# Patient Record
Sex: MALE | Race: WHITE | NOT HISPANIC OR LATINO | URBAN - METROPOLITAN AREA
[De-identification: names, ages, dates, MRNs, and addresses within clinical notes are randomized per-mention and may not be internally consistent; named-entity substitution may affect disease eponyms.]

---

## 2017-04-04 RX ORDER — FINASTERIDE 5 MG/1
0 TABLET, FILM COATED ORAL
Qty: 30 | Refills: 0 | COMMUNITY
Start: 2017-04-04

## 2017-05-31 RX ORDER — OMEPRAZOLE 10 MG/1
0 CAPSULE, DELAYED RELEASE ORAL
Qty: 90 | Refills: 0 | COMMUNITY
Start: 2017-05-31

## 2017-05-31 RX ORDER — SIMVASTATIN 20 MG/1
0 TABLET, FILM COATED ORAL
Qty: 90 | Refills: 0 | COMMUNITY
Start: 2017-05-31

## 2017-07-11 ENCOUNTER — OUTPATIENT (OUTPATIENT)
Dept: OUTPATIENT SERVICES | Facility: HOSPITAL | Age: 82
LOS: 1 days | End: 2017-07-11

## 2017-07-11 ENCOUNTER — APPOINTMENT (OUTPATIENT)
Dept: OPHTHALMOLOGY | Facility: CLINIC | Age: 82
End: 2017-07-11

## 2017-07-12 DIAGNOSIS — H26.9 UNSPECIFIED CATARACT: ICD-10-CM

## 2017-07-13 ENCOUNTER — OUTPATIENT (OUTPATIENT)
Dept: OUTPATIENT SERVICES | Facility: HOSPITAL | Age: 82
LOS: 1 days | Discharge: HOME | End: 2017-07-13

## 2017-07-19 DIAGNOSIS — H26.8 OTHER SPECIFIED CATARACT: ICD-10-CM

## 2017-07-26 RX ORDER — LATANOPROST 0.05 MG/ML
0 SOLUTION/ DROPS OPHTHALMIC; TOPICAL
Qty: 75 | Refills: 0 | COMMUNITY
Start: 2017-07-26

## 2017-07-26 RX ORDER — DORZOLAMIDE HYDROCHLORIDE TIMOLOL MALEATE 20; 5 MG/ML; MG/ML
0 SOLUTION/ DROPS OPHTHALMIC
Qty: 10 | Refills: 0 | COMMUNITY
Start: 2017-07-26

## 2017-08-01 RX ORDER — BRIMONIDINE TARTRATE 2 MG/MG
0 SOLUTION/ DROPS OPHTHALMIC
Qty: 15 | Refills: 0 | COMMUNITY
Start: 2017-08-01

## 2017-08-01 RX ORDER — LOTEPREDNOL ETABONATE 2 MG/ML
0 SUSPENSION/ DROPS OPHTHALMIC
Qty: 10 | Refills: 0 | COMMUNITY
Start: 2017-08-01

## 2017-08-23 RX ORDER — FLUTICASONE PROPIONATE 50 MCG
0 SPRAY, SUSPENSION NASAL
Qty: 16 | Refills: 0 | COMMUNITY
Start: 2017-08-23

## 2017-08-25 ENCOUNTER — INPATIENT (INPATIENT)
Facility: HOSPITAL | Age: 82
LOS: 3 days | Discharge: HOME | DRG: 641 | End: 2017-08-29
Attending: INTERNAL MEDICINE | Admitting: INTERNAL MEDICINE
Payer: MEDICARE

## 2017-08-25 VITALS
HEIGHT: 68 IN | DIASTOLIC BLOOD PRESSURE: 76 MMHG | RESPIRATION RATE: 18 BRPM | TEMPERATURE: 99 F | WEIGHT: 197.98 LBS | OXYGEN SATURATION: 96 % | SYSTOLIC BLOOD PRESSURE: 137 MMHG | HEART RATE: 111 BPM

## 2017-08-25 DIAGNOSIS — E78.4 OTHER HYPERLIPIDEMIA: ICD-10-CM

## 2017-08-25 DIAGNOSIS — Z29.9 ENCOUNTER FOR PROPHYLACTIC MEASURES, UNSPECIFIED: ICD-10-CM

## 2017-08-25 DIAGNOSIS — K21.9 GASTRO-ESOPHAGEAL REFLUX DISEASE WITHOUT ESOPHAGITIS: ICD-10-CM

## 2017-08-25 DIAGNOSIS — E87.1 HYPO-OSMOLALITY AND HYPONATREMIA: ICD-10-CM

## 2017-08-25 DIAGNOSIS — R63.8 OTHER SYMPTOMS AND SIGNS CONCERNING FOOD AND FLUID INTAKE: ICD-10-CM

## 2017-08-25 DIAGNOSIS — R11.2 NAUSEA WITH VOMITING, UNSPECIFIED: ICD-10-CM

## 2017-08-25 DIAGNOSIS — H40.89 OTHER SPECIFIED GLAUCOMA: ICD-10-CM

## 2017-08-25 DIAGNOSIS — Z90.49 ACQUIRED ABSENCE OF OTHER SPECIFIED PARTS OF DIGESTIVE TRACT: Chronic | ICD-10-CM

## 2017-08-25 DIAGNOSIS — N17.9 ACUTE KIDNEY FAILURE, UNSPECIFIED: ICD-10-CM

## 2017-08-25 LAB
ALBUMIN SERPL ELPH-MCNC: 4.1 G/DL — SIGNIFICANT CHANGE UP (ref 3.3–5)
ALP SERPL-CCNC: 139 U/L — HIGH (ref 40–120)
ALT FLD-CCNC: 17 U/L — SIGNIFICANT CHANGE UP (ref 10–45)
ANION GAP SERPL CALC-SCNC: 12 MMOL/L — SIGNIFICANT CHANGE UP (ref 5–17)
ANION GAP SERPL CALC-SCNC: 16 MMOL/L — SIGNIFICANT CHANGE UP (ref 5–17)
APPEARANCE UR: CLEAR — SIGNIFICANT CHANGE UP
AST SERPL-CCNC: 24 U/L — SIGNIFICANT CHANGE UP (ref 10–40)
BACTERIA # UR AUTO: PRESENT /HPF
BASOPHILS NFR BLD AUTO: 0.5 % — SIGNIFICANT CHANGE UP (ref 0–2)
BILIRUB SERPL-MCNC: 0.7 MG/DL — SIGNIFICANT CHANGE UP (ref 0.2–1.2)
BILIRUB UR-MCNC: NEGATIVE — SIGNIFICANT CHANGE UP
BUN SERPL-MCNC: 17 MG/DL — SIGNIFICANT CHANGE UP (ref 7–23)
BUN SERPL-MCNC: 19 MG/DL — SIGNIFICANT CHANGE UP (ref 7–23)
CALCIUM SERPL-MCNC: 8.9 MG/DL — SIGNIFICANT CHANGE UP (ref 8.4–10.5)
CALCIUM SERPL-MCNC: 9.9 MG/DL — SIGNIFICANT CHANGE UP (ref 8.4–10.5)
CHLORIDE SERPL-SCNC: 90 MMOL/L — LOW (ref 96–108)
CHLORIDE SERPL-SCNC: 98 MMOL/L — SIGNIFICANT CHANGE UP (ref 96–108)
CO2 SERPL-SCNC: 21 MMOL/L — LOW (ref 22–31)
CO2 SERPL-SCNC: 22 MMOL/L — SIGNIFICANT CHANGE UP (ref 22–31)
COLOR SPEC: YELLOW — SIGNIFICANT CHANGE UP
CREAT ?TM UR-MCNC: 47 MG/DL — SIGNIFICANT CHANGE UP
CREAT SERPL-MCNC: 1.6 MG/DL — HIGH (ref 0.5–1.3)
CREAT SERPL-MCNC: 1.7 MG/DL — HIGH (ref 0.5–1.3)
DIFF PNL FLD: NEGATIVE — SIGNIFICANT CHANGE UP
EOSINOPHIL NFR BLD AUTO: 2.9 % — SIGNIFICANT CHANGE UP (ref 0–6)
EPI CELLS # UR: SIGNIFICANT CHANGE UP /HPF
GLUCOSE SERPL-MCNC: 121 MG/DL — HIGH (ref 70–99)
GLUCOSE SERPL-MCNC: 131 MG/DL — HIGH (ref 70–99)
GLUCOSE UR QL: NEGATIVE — SIGNIFICANT CHANGE UP
HCT VFR BLD CALC: 43.2 % — SIGNIFICANT CHANGE UP (ref 39–50)
HGB BLD-MCNC: 14.5 G/DL — SIGNIFICANT CHANGE UP (ref 13–17)
HYALINE CASTS # UR AUTO: (no result) /LPF
KETONES UR-MCNC: NEGATIVE — SIGNIFICANT CHANGE UP
LACTATE SERPL-SCNC: 1.3 MMOL/L — SIGNIFICANT CHANGE UP (ref 0.5–2)
LEUKOCYTE ESTERASE UR-ACNC: (no result)
LIDOCAIN IGE QN: 31 U/L — SIGNIFICANT CHANGE UP (ref 7–60)
LYMPHOCYTES # BLD AUTO: 10.4 % — LOW (ref 13–44)
MCHC RBC-ENTMCNC: 29.9 PG — SIGNIFICANT CHANGE UP (ref 27–34)
MCHC RBC-ENTMCNC: 33.6 G/DL — SIGNIFICANT CHANGE UP (ref 32–36)
MCV RBC AUTO: 89.1 FL — SIGNIFICANT CHANGE UP (ref 80–100)
MONOCYTES NFR BLD AUTO: 17.8 % — HIGH (ref 2–14)
NEUTROPHILS NFR BLD AUTO: 68.4 % — SIGNIFICANT CHANGE UP (ref 43–77)
NITRITE UR-MCNC: NEGATIVE — SIGNIFICANT CHANGE UP
OSMOLALITY SERPL: 279 MOSM/KG — LOW (ref 280–301)
OSMOLALITY UR: 265 MOSMOL/KG — SIGNIFICANT CHANGE UP (ref 100–650)
PH UR: 7.5 — SIGNIFICANT CHANGE UP (ref 5–8)
PLATELET # BLD AUTO: 307 K/UL — SIGNIFICANT CHANGE UP (ref 150–400)
POTASSIUM SERPL-MCNC: 4.8 MMOL/L — SIGNIFICANT CHANGE UP (ref 3.5–5.3)
POTASSIUM SERPL-MCNC: 5 MMOL/L — SIGNIFICANT CHANGE UP (ref 3.5–5.3)
POTASSIUM SERPL-SCNC: 4.8 MMOL/L — SIGNIFICANT CHANGE UP (ref 3.5–5.3)
POTASSIUM SERPL-SCNC: 5 MMOL/L — SIGNIFICANT CHANGE UP (ref 3.5–5.3)
PROT SERPL-MCNC: 8 G/DL — SIGNIFICANT CHANGE UP (ref 6–8.3)
PROT UR-MCNC: NEGATIVE MG/DL — SIGNIFICANT CHANGE UP
RBC # BLD: 4.85 M/UL — SIGNIFICANT CHANGE UP (ref 4.2–5.8)
RBC # FLD: 12.1 % — SIGNIFICANT CHANGE UP (ref 10.3–16.9)
RBC CASTS # UR COMP ASSIST: < 5 /HPF — SIGNIFICANT CHANGE UP
SODIUM SERPL-SCNC: 127 MMOL/L — LOW (ref 135–145)
SODIUM SERPL-SCNC: 132 MMOL/L — LOW (ref 135–145)
SODIUM UR-SCNC: 69 MMOL/L — SIGNIFICANT CHANGE UP
SP GR SPEC: 1.01 — SIGNIFICANT CHANGE UP (ref 1–1.03)
TROPONIN T SERPL-MCNC: <0.01 NG/ML — SIGNIFICANT CHANGE UP (ref 0–0.01)
UROBILINOGEN FLD QL: 0.2 E.U./DL — SIGNIFICANT CHANGE UP
UUN UR-MCNC: 262 MG/DL — SIGNIFICANT CHANGE UP
WBC # BLD: 6.6 K/UL — SIGNIFICANT CHANGE UP (ref 3.8–10.5)
WBC # FLD AUTO: 6.6 K/UL — SIGNIFICANT CHANGE UP (ref 3.8–10.5)
WBC UR QL: < 5 /HPF — SIGNIFICANT CHANGE UP

## 2017-08-25 PROCEDURE — 93010 ELECTROCARDIOGRAM REPORT: CPT

## 2017-08-25 PROCEDURE — 74176 CT ABD & PELVIS W/O CONTRAST: CPT | Mod: 26

## 2017-08-25 PROCEDURE — 99285 EMERGENCY DEPT VISIT HI MDM: CPT | Mod: 25

## 2017-08-25 PROCEDURE — 74020: CPT | Mod: 26

## 2017-08-25 PROCEDURE — 71020: CPT | Mod: 26

## 2017-08-25 RX ORDER — SIMVASTATIN 20 MG/1
40 TABLET, FILM COATED ORAL AT BEDTIME
Qty: 0 | Refills: 0 | Status: DISCONTINUED | OUTPATIENT
Start: 2017-08-25 | End: 2017-08-29

## 2017-08-25 RX ORDER — PANTOPRAZOLE SODIUM 20 MG/1
40 TABLET, DELAYED RELEASE ORAL
Qty: 0 | Refills: 0 | Status: DISCONTINUED | OUTPATIENT
Start: 2017-08-25 | End: 2017-08-29

## 2017-08-25 RX ORDER — ONDANSETRON 8 MG/1
4 TABLET, FILM COATED ORAL ONCE
Qty: 0 | Refills: 0 | Status: COMPLETED | OUTPATIENT
Start: 2017-08-25 | End: 2017-08-25

## 2017-08-25 RX ORDER — HEPARIN SODIUM 5000 [USP'U]/ML
5000 INJECTION INTRAVENOUS; SUBCUTANEOUS EVERY 8 HOURS
Qty: 0 | Refills: 0 | Status: DISCONTINUED | OUTPATIENT
Start: 2017-08-25 | End: 2017-08-29

## 2017-08-25 RX ORDER — SODIUM CHLORIDE 9 MG/ML
1000 INJECTION, SOLUTION INTRAVENOUS
Qty: 0 | Refills: 0 | Status: DISCONTINUED | OUTPATIENT
Start: 2017-08-25 | End: 2017-08-28

## 2017-08-25 RX ORDER — ACETAMINOPHEN 500 MG
650 TABLET ORAL EVERY 6 HOURS
Qty: 0 | Refills: 0 | Status: DISCONTINUED | OUTPATIENT
Start: 2017-08-25 | End: 2017-08-29

## 2017-08-25 RX ORDER — SODIUM CHLORIDE 9 MG/ML
1000 INJECTION INTRAMUSCULAR; INTRAVENOUS; SUBCUTANEOUS ONCE
Qty: 0 | Refills: 0 | Status: COMPLETED | OUTPATIENT
Start: 2017-08-25 | End: 2017-08-25

## 2017-08-25 RX ORDER — LATANOPROST 0.05 MG/ML
1 SOLUTION/ DROPS OPHTHALMIC; TOPICAL AT BEDTIME
Qty: 0 | Refills: 0 | Status: DISCONTINUED | OUTPATIENT
Start: 2017-08-25 | End: 2017-08-29

## 2017-08-25 RX ORDER — SODIUM CHLORIDE 9 MG/ML
1000 INJECTION INTRAMUSCULAR; INTRAVENOUS; SUBCUTANEOUS
Qty: 0 | Refills: 0 | Status: DISCONTINUED | OUTPATIENT
Start: 2017-08-25 | End: 2017-08-25

## 2017-08-25 RX ORDER — FAMOTIDINE 10 MG/ML
20 INJECTION INTRAVENOUS ONCE
Qty: 0 | Refills: 0 | Status: COMPLETED | OUTPATIENT
Start: 2017-08-25 | End: 2017-08-25

## 2017-08-25 RX ADMIN — FAMOTIDINE 20 MILLIGRAM(S): 10 INJECTION INTRAVENOUS at 10:31

## 2017-08-25 RX ADMIN — SODIUM CHLORIDE 1000 MILLILITER(S): 9 INJECTION INTRAMUSCULAR; INTRAVENOUS; SUBCUTANEOUS at 10:30

## 2017-08-25 RX ADMIN — HEPARIN SODIUM 5000 UNIT(S): 5000 INJECTION INTRAVENOUS; SUBCUTANEOUS at 21:22

## 2017-08-25 RX ADMIN — SIMVASTATIN 40 MILLIGRAM(S): 20 TABLET, FILM COATED ORAL at 21:22

## 2017-08-25 RX ADMIN — ONDANSETRON 104 MILLIGRAM(S): 8 TABLET, FILM COATED ORAL at 10:31

## 2017-08-25 RX ADMIN — LATANOPROST 1 DROP(S): 0.05 SOLUTION/ DROPS OPHTHALMIC; TOPICAL at 21:22

## 2017-08-25 RX ADMIN — SODIUM CHLORIDE 100 MILLILITER(S): 9 INJECTION, SOLUTION INTRAVENOUS at 21:22

## 2017-08-25 RX ADMIN — SODIUM CHLORIDE 125 MILLILITER(S): 9 INJECTION INTRAMUSCULAR; INTRAVENOUS; SUBCUTANEOUS at 13:30

## 2017-08-25 NOTE — H&P ADULT - PROBLEM SELECTOR PLAN 5
s/p cataract surgery Aug 10. Currently w/o complaints.   - c/w home lantoprost 0.005% solution - c/w simvastatin 40mg

## 2017-08-25 NOTE — H&P ADULT - PROBLEM SELECTOR PLAN 6
F:   E: Replete electrolytes PRN goal K>2 Mg>4  N: Regular Diet s/p cataract surgery Aug 10. Currently w/o complaints.   - c/w home Latanoprost 0.005% solution

## 2017-08-25 NOTE — H&P ADULT - NSHPPHYSICALEXAM_GEN_ALL_CORE
.  VITAL SIGNS:  T(F): 98.3 (08-25-17 @ 16:00), Max: 98.6 (08-25-17 @ 09:49)  HR: 83 (08-25-17 @ 16:00) (82 - 111)  BP: 148/82 (08-25-17 @ 16:00) (122/70 - 148/82)  BP(mean): --  RR: 18 (08-25-17 @ 16:00) (18 - 18)  SpO2: 95% (08-25-17 @ 16:00) (95% - 98%)    PHYSICAL EXAM:    Constitutional: NAD. Appears stated age. Well appearing elderly M   HEENT: NC/AT, PERRL, EOMI, anicteric sclera, no nasal discharge; uvula midline, no oropharyngeal erythema or exudates; DRY MUCUS MEMBRANES  Neck: supple; no JVD   Respiratory: CTA B/L; no W/R/R, no retractions  Cardiac: +S1/S2; regular rate and rhythm, no M/R/G; PMI non-displaced  Gastrointestinal: soft, NT/ND; no rebound or guarding; +BSx4. Belching and c/o Nausea when palpating epigastric region.  Back: spine midline, no bony tenderness or step-offs; no CVAT B/L  Extremities: WWP, no clubbing or cyanosis; no peripheral edema  Musculoskeletal: NROM x4; no joint swelling, tenderness or erythema  Vascular: 2+ radial, DP/PT pulses B/L  Neurologic: AAOx3; CNII-XII grossly intact; no focal deficits

## 2017-08-25 NOTE — H&P ADULT - NSHPSOCIALHISTORY_GEN_ALL_CORE
Patient lives alone, strong support system with daughter who lives in [place]  Patient former smoker (quit 20years ago), was unable to quantify amount. Former ETOH use (6-7 beers/day), last drink 6 years ago.  No IVDU no recreation drug use. Patient lives alone, strong support system with daughter who lives in [place].  I DISCUSSED HIS CASE WITH HER  Patient former smoker (quit 20years ago), was unable to quantify amount. Former ETOH use (6-7 beers/day), last drink 6 years ago.  No IVDU no recreation drug use.

## 2017-08-25 NOTE — H&P ADULT - ASSESSMENT
82y M with PMHx of GERD, recent cataract surgery and h/o cholecystectomy (2016) presented to [facility] ED dry heaving overnight, worsening of his baseline nausea x 2years. 82y M with PMHx of GERD, recent cataract surgery and h/o cholecystectomy (2016) presented to [facility] ED dry heaving overnight, worsening of his baseline nausea x 2years.     IV FLUID  D/C MRCP    ultrasound proximal pancreas

## 2017-08-25 NOTE — H&P ADULT - PROBLEM SELECTOR PLAN 3
- c/w home dose Omeprazole 20mg PO Qd Cr on admission 1.7. Likely preprenal in the setting of decreased PO and signs of hypovolemia  - recheck BMP, consider starting maintenance and encourage PO intake/hydration  - f/u urine lytes and calc FENa Cr on admission 1.7. Likely pre-renal in the setting of decreased PO and signs of hypovolemia  - recheck BMP, consider starting maintenance and encourage PO intake/hydration  - f/u urine lytes and calc FENa Cr on admission 1.7. Likely pre-renal in the setting of decreased PO and signs of hypovolemia MISSING RIGHT KIDNEY  - recheck BMP, consider starting maintenance and encourage PO intake/hydration  - f/u urine lytes and calc FENa

## 2017-08-25 NOTE — H&P ADULT - PROBLEM SELECTOR PLAN 2
Na 127, likely hypovolemic hyponatremia in the setting of decreased PO intake. Pt AOx3. s/p 1L NS  - c/w maintenance fluids 120 cc/Hr; monitor with frequent lung checks   - f/u Urine lytes to calc FENa  - continue to encourage PO intake Na 127, likely hypovolemic hyponatremia in the setting of decreased PO intake. Pt AOx3. s/p 1L NS  - recheck BMP, consider starting maintenance fluids 120 cc/Hr; monitor with frequent lung checks   - f/u Urine lytes to calc FENa  - continue to encourage PO intake

## 2017-08-25 NOTE — H&P ADULT - REASON FOR ADMISSION
Nausea, vomiting with associated abdominal pain Worsening nausea over the last two years, with dry heaves "all night"

## 2017-08-25 NOTE — H&P ADULT - NSHPLABSRESULTS_GEN_ALL_CORE
.  LABS:                         14.5   6.6   )-----------( 307      ( 25 Aug 2017 10:32 )             43.2         127<L>  |  90<L>  |  19  ----------------------------<  131<H>  4.8   |  21<L>  |  1.70<H>    Ca    9.9      25 Aug 2017 10:32    TPro  8.0  /  Alb  4.1  /  TBili  0.7  /  DBili  x   /  AST  24  /  ALT  17  /  AlkPhos  139<H>        Urinalysis Basic - ( 25 Aug 2017 12:18 )    Color: Yellow / Appearance: Clear / S.010 / pH: x  Gluc: x / Ketone: NEGATIVE  / Bili: NEGATIVE / Urobili: 0.2 E.U./dL   Blood: x / Protein: NEGATIVE mg/dL / Nitrite: NEGATIVE   Leuk Esterase: Trace / RBC: < 5 /HPF / WBC < 5 /HPF   Sq Epi: x / Non Sq Epi: Rare /HPF / Bacteria: Present /HPF      CARDIAC MARKERS ( 25 Aug 2017 10:32 )  x     / <0.01 ng/mL / 51 U/L / x     / 1.5 ng/mL        Lactate, Blood: 1.3 mmoL/L ( @ 10:30)      RADIOLOGY, EKG & ADDITIONAL TESTS: Reviewed. .  LABS:                         14.5   6.6   )-----------( 307      ( 25 Aug 2017 10:32 )             43.2         127<L>  |  90<L>  |  19  ----------------------------<  131<H>  4.8   |  21<L>  |  1.70<H>    Ca    9.9      25 Aug 2017 10:32    TPro  8.0  /  Alb  4.1  /  TBili  0.7  /  DBili  x   /  AST  24  /  ALT  17  /  AlkPhos  139<H>        Urinalysis Basic - ( 25 Aug 2017 12:18 )    Color: Yellow / Appearance: Clear / S.010 / pH: x  Gluc: x / Ketone: NEGATIVE  / Bili: NEGATIVE / Urobili: 0.2 E.U./dL   Blood: x / Protein: NEGATIVE mg/dL / Nitrite: NEGATIVE   Leuk Esterase: Trace / RBC: < 5 /HPF / WBC < 5 /HPF   Sq Epi: x / Non Sq Epi: Rare /HPF / Bacteria: Present /HPF      CARDIAC MARKERS ( 25 Aug 2017 10:32 )  x     / <0.01 ng/mL / 51 U/L / x     / 1.5 ng/mL        Lactate, Blood: 1.3 mmoL/L ( @ 10:30)      RADIOLOGY, EKG & ADDITIONAL TESTS: will review

## 2017-08-25 NOTE — H&P ADULT - PROBLEM SELECTOR PLAN 1
CT Abd:   - Zofran 4mg PRN Pt c/o worsening nausea x2 years, with dry-heaving o/n. Pt  burp/dry heave on palpation epigastric region. No likely infectious in origin. CT Abd demonstrates evidence of hiatal hernia.   - Pt c/o worsening nausea x2 years, with dry-heaving o/n. Pt  burp/dry heave on palpation epigastric region. No likely infectious in origin. CT Abd demonstrates evidence of hiatal hernia.   - Given QTc 479, try to avoid antiemetics; if required recheck after admission  - Antacids and PPI Pt c/o worsening nausea x2 years, with dry-heaving o/n. Pt  burp/dry heave on palpation epigastric region. No likely infectious in origin. CT Abd demonstrates evidence of hiatal hernia.   - Given QTc 479, try to avoid antiemetics; if required recheck ECG after given  - Antacids and PPI

## 2017-08-25 NOTE — ED PROVIDER NOTE - CARE PLAN
Principal Discharge DX:	Abdominal pain  Secondary Diagnosis:	Nausea & vomiting Principal Discharge DX:	Abdominal pain  Secondary Diagnosis:	Nausea & vomiting  Secondary Diagnosis:	Hyponatremia

## 2017-08-25 NOTE — ED PROVIDER NOTE - MEDICAL DECISION MAKING DETAILS
abd pain x 2 days  N/V  mid  ab tenderness non obstructive bowel gas pattern  RBB on EKG  with pvcs  no ESPINOZA or PND  not obv aCS  await trop ? gastritis vs appy ? will CT

## 2017-08-25 NOTE — ED ADULT NURSE NOTE - OBJECTIVE STATEMENT
c/o ABD pain, nausea, vomited 15 times. started 12 hours ago. Gallbladder removed 2 years ago. Pain mid upper area, similar to gall bladder problems. Eye surgery ( glaucoma) 8/10/17 started new eye drops. 2 days ago ed using 1 eyedrop. Due to dizziness. Pt c/o ABD pain, nausea, vomited 15 times. started 12 hours ago. Gallbladder removed 2 years ago. Pain mid upper area, similar to gall bladder problems. Eye surgery ( glaucoma) 8/10/17 started new eye drops. 2 days ago ed using 1 eyedrop. Due to dizziness. Denies CP, SOB, Not in distress.

## 2017-08-25 NOTE — H&P ADULT - HISTORY OF PRESENT ILLNESS
In the ED, VS T 37 137/76  RR18 (96% RA). Labs significant for hyponatremia (127). Patient given 1L NS Bolus and started on NS @ In the ED, VS T 37 137/76  RR18 (96% RA). Labs significant for hyponatremia (127). Patient given 1L NS Bolus and started on NS @125cc/Hr. S/p Zofran 4mg IVP and famotidine 20mg IVP 82y M with PMHx of GERD, recent cataract surgery and h/o cholecystectomy presented to [facility] ED with 2 years of N/V. Per patient, he has had Nausea and dry heaving for the last 2 years but overnight, his sx worsened and he dry heaved "hundreds of times." Patient describes the discomft as "gas like" in the epigastric region with associated belching with minor relief. Pt cant identify exacerbating/alleviating sx. Patient's last BM was while in the ED. Patient endorsed decrease PO intake over the last couple of days 2/2 to worsening nausea; currently, he feels very thirsy. Pt denies Abd or back pain. Patient w/o any other complaints. Of note, patient have recent cataract surgery (Aug 10) and tolerated the procedure well, has been compliant with prescribed drops.   Pt denies chest pain, palpitations, lightheadedness SOB, HA, numbness tingling, recent weight loss/gain, dysuria, fever/chills.     In the ED, VS T 37 137/76  RR18 (96% RA). Labs significant for hyponatremia (127). Patient given 1L NS Bolus and started on NS @125cc/Hr. 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 ED with 2 years of N/V. Per patient, he has had Nausea and dry heaving for the last 2 years but overnight, his sx worsened and he dry heaved "hundreds of times." Patient describes the discomft as "gas like" in the epigastric region with associated belching with minor relief. Pt cant identify exacerbating/alleviating sx. Patient's last BM was while in the ED. Patient endorsed decrease PO intake over the last couple of days 2/2 to worsening nausea; currently, he feels very thirsty. THE PAIN WAS SO BAD THAT HE THOUGHT ABOUT SUICIDE. LIKE A KNIFE Pt denies Abd or back pain. Patient w/o any other complaints. Of note, patient have recent cataract surgery (Aug 10) and tolerated the procedure well, has been compliant with prescribed drops.   Pt denies chest pain, palpitations, lightheadedness SOB, HA, numbness tingling, recent weight loss/gain, dysuria, fever/chills.     In the ED, VS T 37 137/76  RR18 (96% RA). Labs significant for hyponatremia (127). Patient given 1L NS Bolus and started on NS @125cc/Hr. S/p Zofran 4mg IVP and famotidine 20mg IVP

## 2017-08-25 NOTE — ED PROVIDER NOTE - OBJECTIVE STATEMENT
82 male s/p lap choly 2 years ago- with N/V  x 2 episodes in the past day no SOB or CP -  had cataract surgery 2 days ago -  no headache or worsening  visual changes -no neck pain   no cough or sob  no leg edema--- dec po intake over the past 2 days  no melena or hematochezia   no diarrhea  no prior hx of PUD  no weight loss or fatigue recently

## 2017-08-25 NOTE — H&P ADULT - PROBLEM SELECTOR PLAN 7
SubQ heparin    FULL CODE  Dispo: home when clinically stable. F:   E: Replete electrolytes PRN goal K>2 Mg>4  N: Regular Diet F: Pen BMP, consider maintenance  E: Replete electrolytes PRN goal K>2 Mg>4  N: Regular Diet

## 2017-08-25 NOTE — ED ADULT NURSE NOTE - PMH
Benign prostatic hyperplasia, presence of lower urinary tract symptoms unspecified, unspecified morphology    Other glaucoma of both eyes    Other hyperlipidemia

## 2017-08-25 NOTE — H&P ADULT - NSHPREVIEWOFSYSTEMS_GEN_ALL_CORE
CONSTITUTIONAL: No weakness, fevers or chills  EYES/ENT: No visual changes (improvement 2/2 recent cataract sx);  No vertigo or throat pain   NECK: No pain or stiffness  RESPIRATORY: No cough, wheezing, hemoptysis; No shortness of breath  CARDIOVASCULAR: No chest pain or palpitations  GASTROINTESTINAL: (-) abdominal pain (+) nausea (+) dry-heave (-) vomiting.  No diarrhea or constipation. No melena or hematochezia.  GENITOURINARY: No dysuria, frequency or hematuria  NEUROLOGICAL: No numbness or weakness  SKIN: No itching, burning, rashes, or lesions   All other review of systems is negative unless indicated above.

## 2017-08-26 ENCOUNTER — TRANSCRIPTION ENCOUNTER (OUTPATIENT)
Age: 82
End: 2017-08-26

## 2017-08-26 DIAGNOSIS — R10.9 UNSPECIFIED ABDOMINAL PAIN: ICD-10-CM

## 2017-08-26 DIAGNOSIS — E86.0 DEHYDRATION: ICD-10-CM

## 2017-08-26 DIAGNOSIS — R05 COUGH: ICD-10-CM

## 2017-08-26 DIAGNOSIS — H10.9 UNSPECIFIED CONJUNCTIVITIS: ICD-10-CM

## 2017-08-26 DIAGNOSIS — R41.82 ALTERED MENTAL STATUS, UNSPECIFIED: ICD-10-CM

## 2017-08-26 DIAGNOSIS — R09.82 POSTNASAL DRIP: ICD-10-CM

## 2017-08-26 DIAGNOSIS — R41.0 DISORIENTATION, UNSPECIFIED: ICD-10-CM

## 2017-08-26 LAB
ANION GAP SERPL CALC-SCNC: 12 MMOL/L — SIGNIFICANT CHANGE UP (ref 5–17)
BUN SERPL-MCNC: 16 MG/DL — SIGNIFICANT CHANGE UP (ref 7–23)
CALCIUM SERPL-MCNC: 8.8 MG/DL — SIGNIFICANT CHANGE UP (ref 8.4–10.5)
CHLORIDE SERPL-SCNC: 99 MMOL/L — SIGNIFICANT CHANGE UP (ref 96–108)
CO2 SERPL-SCNC: 23 MMOL/L — SIGNIFICANT CHANGE UP (ref 22–31)
CREAT SERPL-MCNC: 1.6 MG/DL — HIGH (ref 0.5–1.3)
CULTURE RESULTS: NO GROWTH — SIGNIFICANT CHANGE UP
GLUCOSE SERPL-MCNC: 104 MG/DL — HIGH (ref 70–99)
HCT VFR BLD CALC: 37.7 % — LOW (ref 39–50)
HGB BLD-MCNC: 12.6 G/DL — LOW (ref 13–17)
MAGNESIUM SERPL-MCNC: 2.1 MG/DL — SIGNIFICANT CHANGE UP (ref 1.6–2.6)
MCHC RBC-ENTMCNC: 30.7 PG — SIGNIFICANT CHANGE UP (ref 27–34)
MCHC RBC-ENTMCNC: 33.4 G/DL — SIGNIFICANT CHANGE UP (ref 32–36)
MCV RBC AUTO: 92 FL — SIGNIFICANT CHANGE UP (ref 80–100)
PHOSPHATE SERPL-MCNC: 3.9 MG/DL — SIGNIFICANT CHANGE UP (ref 2.5–4.5)
PLATELET # BLD AUTO: 273 K/UL — SIGNIFICANT CHANGE UP (ref 150–400)
POTASSIUM SERPL-MCNC: 4.7 MMOL/L — SIGNIFICANT CHANGE UP (ref 3.5–5.3)
POTASSIUM SERPL-SCNC: 4.7 MMOL/L — SIGNIFICANT CHANGE UP (ref 3.5–5.3)
RBC # BLD: 4.1 M/UL — LOW (ref 4.2–5.8)
RBC # FLD: 12.6 % — SIGNIFICANT CHANGE UP (ref 10.3–16.9)
SODIUM SERPL-SCNC: 134 MMOL/L — LOW (ref 135–145)
SPECIMEN SOURCE: SIGNIFICANT CHANGE UP
WBC # BLD: 6.1 K/UL — SIGNIFICANT CHANGE UP (ref 3.8–10.5)
WBC # FLD AUTO: 6.1 K/UL — SIGNIFICANT CHANGE UP (ref 3.8–10.5)

## 2017-08-26 RX ORDER — LOTEPREDNOL ETABONATE 2 MG/ML
1 SUSPENSION/ DROPS OPHTHALMIC THREE TIMES A DAY
Qty: 0 | Refills: 0 | Status: DISCONTINUED | OUTPATIENT
Start: 2017-08-26 | End: 2017-08-29

## 2017-08-26 RX ORDER — FLUTICASONE PROPIONATE 50 MCG
1 SPRAY, SUSPENSION NASAL DAILY
Qty: 0 | Refills: 0 | Status: DISCONTINUED | OUTPATIENT
Start: 2017-08-26 | End: 2017-08-29

## 2017-08-26 RX ORDER — DORZOLAMIDE HYDROCHLORIDE TIMOLOL MALEATE 20; 5 MG/ML; MG/ML
1 SOLUTION/ DROPS OPHTHALMIC
Qty: 0 | Refills: 0 | Status: DISCONTINUED | OUTPATIENT
Start: 2017-08-26 | End: 2017-08-29

## 2017-08-26 RX ORDER — BRIMONIDINE TARTRATE 2 MG/MG
1 SOLUTION/ DROPS OPHTHALMIC
Qty: 0 | Refills: 0 | Status: DISCONTINUED | OUTPATIENT
Start: 2017-08-26 | End: 2017-08-29

## 2017-08-26 RX ORDER — THIAMINE MONONITRATE (VIT B1) 100 MG
100 TABLET ORAL DAILY
Qty: 0 | Refills: 0 | Status: DISCONTINUED | OUTPATIENT
Start: 2017-08-26 | End: 2017-08-29

## 2017-08-26 RX ORDER — FLUTICASONE PROPIONATE 50 MCG
1 SPRAY, SUSPENSION NASAL
Qty: 0 | Refills: 0 | COMMUNITY
Start: 2017-08-26

## 2017-08-26 RX ORDER — DORZOLAMIDE HYDROCHLORIDE TIMOLOL MALEATE 20; 5 MG/ML; MG/ML
1 SOLUTION/ DROPS OPHTHALMIC
Qty: 0 | Refills: 0 | COMMUNITY
Start: 2017-08-26

## 2017-08-26 RX ORDER — THIAMINE MONONITRATE (VIT B1) 100 MG
100 TABLET ORAL DAILY
Qty: 0 | Refills: 0 | Status: DISCONTINUED | OUTPATIENT
Start: 2017-08-26 | End: 2017-08-26

## 2017-08-26 RX ORDER — BRIMONIDINE TARTRATE 2 MG/MG
1 SOLUTION/ DROPS OPHTHALMIC
Qty: 0 | Refills: 0 | COMMUNITY
Start: 2017-08-26

## 2017-08-26 RX ORDER — SODIUM CHLORIDE 0.65 %
1 AEROSOL, SPRAY (ML) NASAL DAILY
Qty: 0 | Refills: 0 | Status: DISCONTINUED | OUTPATIENT
Start: 2017-08-26 | End: 2017-08-29

## 2017-08-26 RX ADMIN — DORZOLAMIDE HYDROCHLORIDE TIMOLOL MALEATE 1 DROP(S): 20; 5 SOLUTION/ DROPS OPHTHALMIC at 17:33

## 2017-08-26 RX ADMIN — HEPARIN SODIUM 5000 UNIT(S): 5000 INJECTION INTRAVENOUS; SUBCUTANEOUS at 06:11

## 2017-08-26 RX ADMIN — Medication 650 MILLIGRAM(S): at 01:32

## 2017-08-26 RX ADMIN — SIMVASTATIN 40 MILLIGRAM(S): 20 TABLET, FILM COATED ORAL at 21:22

## 2017-08-26 RX ADMIN — PANTOPRAZOLE SODIUM 40 MILLIGRAM(S): 20 TABLET, DELAYED RELEASE ORAL at 06:11

## 2017-08-26 RX ADMIN — Medication 102 MILLIGRAM(S): at 16:05

## 2017-08-26 RX ADMIN — Medication 1 SPRAY(S): at 18:55

## 2017-08-26 RX ADMIN — HEPARIN SODIUM 5000 UNIT(S): 5000 INJECTION INTRAVENOUS; SUBCUTANEOUS at 13:21

## 2017-08-26 RX ADMIN — BRIMONIDINE TARTRATE 1 DROP(S): 2 SOLUTION/ DROPS OPHTHALMIC at 17:50

## 2017-08-26 RX ADMIN — LOTEPREDNOL ETABONATE 1 DROP(S): 2 SUSPENSION/ DROPS OPHTHALMIC at 23:08

## 2017-08-26 RX ADMIN — LATANOPROST 1 DROP(S): 0.05 SOLUTION/ DROPS OPHTHALMIC; TOPICAL at 21:22

## 2017-08-26 RX ADMIN — SODIUM CHLORIDE 100 MILLILITER(S): 9 INJECTION, SOLUTION INTRAVENOUS at 20:53

## 2017-08-26 RX ADMIN — HEPARIN SODIUM 5000 UNIT(S): 5000 INJECTION INTRAVENOUS; SUBCUTANEOUS at 21:22

## 2017-08-26 RX ADMIN — Medication 1 SPRAY(S): at 18:54

## 2017-08-26 NOTE — PROGRESS NOTE ADULT - SUBJECTIVE AND OBJECTIVE BOX
INTERVAL HPI/OVERNIGHT EVENTS:  Patient was seen and examined at bedside. Pt states that he is still having nausea and feels uneasy. He has been dry heaving for awhile and does not seem to stop. He ate his tray fully with no problems but immediately felt nauseous after. Pt has waxing and waning mental status and is hard to assess fully. Patient denies: fever, chills, dizziness, weakness, HA, Changes in vision, CP, palpitations, SOB, cough, dysuria, changes in bowel movements, LE edema.    VITAL SIGNS:  T(F): 98 (17 @ 16:33)  HR: 75 (17 @ 16:33)  BP: 135/70 (17 @ 16:33)  RR: 20 (17 @ 16:33)  SpO2: 95% (17 @ 16:33)  Wt(kg): --    PHYSICAL EXAM:    Constitutional: WDWN, NAD  Eyes: PERRL,  + LEFT conjunctivitis, -scleritis   Neck: supple, trachea midline, no masses, no JVD  Respiratory: bilateral diminished breath sounds, -wheezing, -rhonchi, -rales, -crackles  Cardiovascular: RRR, normal S1S2, no M/R/G  Gastrointestinal: soft, mildly tender, nondistended, no masses palpable, BS normal  Extremities: Warm, well perfused, pulses equal bilateral upper and lower extremities, no edema, no clubbing  Neurological: AAOx3, CN Grossly intact  Skin: Normal temperature, warm, dry    MEDICATIONS  (STANDING):  simvastatin 40 milliGRAM(s) Oral at bedtime  latanoprost 0.005% Ophthalmic Solution 1 Drop(s) Both EYES at bedtime  pantoprazole    Tablet 40 milliGRAM(s) Oral before breakfast  heparin  Injectable 5000 Unit(s) SubCutaneous every 8 hours  sodium chloride 0.45%. 1000 milliLiter(s) (100 mL/Hr) IV Continuous <Continuous>  sodium chloride 0.65% Nasal 1 Spray(s) Both Nostrils daily  fluticasone propionate 50 MICROgram(s)/spray Nasal Spray 1 Spray(s) Both Nostrils daily  thiamine IVPB 100 milliGRAM(s) IV Intermittent daily    MEDICATIONS  (PRN):  aluminum hydroxide/magnesium hydroxide/simethicone Suspension 30 milliLiter(s) Oral every 4 hours PRN Dyspepsia  acetaminophen   Tablet 650 milliGRAM(s) Oral every 6 hours PRN moderate pain 4-6      Allergies    No Known Allergies    Intolerances        LABS:                        12.6   6.1   )-----------( 273      ( 26 Aug 2017 05:49 )             37.7     -    134<L>  |  99  |  16  ----------------------------<  104<H>  4.7   |  23  |  1.60<H>    Ca    8.8      26 Aug 2017 05:49  Phos  3.9       Mg     2.1         TPro  8.0  /  Alb  4.1  /  TBili  0.7  /  DBili  x   /  AST  24  /  ALT  17  /  AlkPhos  139<H>        Urinalysis Basic - ( 25 Aug 2017 12:18 )    Color: Yellow / Appearance: Clear / S.010 / pH: x  Gluc: x / Ketone: NEGATIVE  / Bili: NEGATIVE / Urobili: 0.2 E.U./dL   Blood: x / Protein: NEGATIVE mg/dL / Nitrite: NEGATIVE   Leuk Esterase: Trace / RBC: < 5 /HPF / WBC < 5 /HPF   Sq Epi: x / Non Sq Epi: Rare /HPF / Bacteria: Present /HPF        RADIOLOGY & ADDITIONAL TESTS:

## 2017-08-26 NOTE — DISCHARGE NOTE ADULT - CARE PLAN
Principal Discharge DX:	Nausea & vomiting  Secondary Diagnosis:	Altered mental state  Secondary Diagnosis:	LORENZO (acute kidney injury)  Secondary Diagnosis:	Hyponatremia  Secondary Diagnosis:	Postnasal discharge  Secondary Diagnosis:	Conjunctivitis  Secondary Diagnosis:	Other glaucoma of both eyes Principal Discharge DX:	Nausea & vomiting  Secondary Diagnosis:	LORENZO (acute kidney injury)  Secondary Diagnosis:	Hyponatremia  Secondary Diagnosis:	Postnasal discharge  Secondary Diagnosis:	Conjunctivitis  Secondary Diagnosis:	Other glaucoma of both eyes Principal Discharge DX:	Nausea & vomiting  Goal:	Discharge to home. Stable.  Instructions for follow-up, activity and diet:	You presented with increasing nausea and vomiting at home. You had episodes of dry heaving in the hospital, and the nausea was constant, worse after you ate. A CT scan showed some gastric wall thickening, but was otherwise negative. The abdominal ultrasound that was done was negative as well. GI was consulted during your visit and suggests and endoscopy or MRCP outpatient. During your stay, the nausea improved and you can tolerate more oral intake. An appointment has been made with Dr. Bucio. Please follow up for more testing regarding the nausea.  Secondary Diagnosis:	LORENZO (acute kidney injury)  Secondary Diagnosis:	Hyponatremia  Secondary Diagnosis:	Postnasal discharge  Secondary Diagnosis:	Other glaucoma of both eyes Principal Discharge DX:	Nausea & vomiting  Goal:	Discharge to home. Stable.  Instructions for follow-up, activity and diet:	You presented with increasing nausea and vomiting at home. You had episodes of dry heaving in the hospital, and the nausea was constant, worse after you ate. A CT scan showed some gastric wall thickening, but was otherwise negative. The abdominal ultrasound that was done was negative as well. GI was consulted during your visit and suggests and endoscopy or MRCP outpatient. During your stay, the nausea improved and you can tolerate more oral intake. An appointment has been made with Dr. Bucio. Please follow up for more testing regarding the nausea.  Secondary Diagnosis:	LORENZO (acute kidney injury)  Goal:	Resolving.  Instructions for follow-up, activity and diet:	You presented with mildly elevated kidney function tests. During your admission, you received fluids and your kidney function tests remained stable. Please follow up with your PMD.  Secondary Diagnosis:	Hyponatremia  Goal:	Stable.  Instructions for follow-up, activity and diet:	You presented with a mildly decreased sodium. Your sodium level remained unchanged during your admission and you were asymptomatic. Please follow with your PMD.  Secondary Diagnosis:	Postnasal discharge  Goal:	Resolving.  Instructions for follow-up, activity and diet:	You complained of post nasal drip during your admission. You were given Flonase and saline washes and noted that your symptoms improved.  Secondary Diagnosis:	Other glaucoma of both eyes  Goal:	Stable.  Instructions for follow-up, activity and diet:	You had blurry vision during your admission in the hospital. An opthamologist saw you in the hospital, and notes you had increased ocular pressure, but nothing acute. he switched your eye drops to both eyes. Please follow up with Dr. Leiva tomorrow for your eye check.

## 2017-08-26 NOTE — PROGRESS NOTE ADULT - PROBLEM SELECTOR PLAN 1
Pt c/o worsening nausea x2 years, with dry-heaving o/n. Pt  burp/dry heave on palpation epigastric region. No likely infectious in origin. CT Abd demonstrates evidence of hiatal hernia.   - Given QTc 479, try to avoid antiemetics; if required recheck ECG after given  - Antacids and PPI Pt c/o worsening nausea x2 years, with dry-heaving o/n. Pt  burp/dry heave on palpation epigastric region. No likely infectious in origin. CT Abd demonstrates evidence of hiatal hernia.   - Given QTc 479, try to avoid antiemetics; if required recheck ECG after given  - Antacids and PPI  -CT showed possible stone in duodenal region (pt s/p cholecystectomy), possible MRCP. Ordered US for follow up to see if stone is present, f/u results

## 2017-08-26 NOTE — DIETITIAN INITIAL EVALUATION ADULT. - PROBLEM SELECTOR PLAN 3
Cr on admission 1.7. Likely pre-renal in the setting of decreased PO and signs of hypovolemia MISSING RIGHT KIDNEY  - recheck BMP, consider starting maintenance and encourage PO intake/hydration  - f/u urine lytes and calc FENa

## 2017-08-26 NOTE — DISCHARGE NOTE ADULT - ADDITIONAL INSTRUCTIONS
You have already made your follow up appt with your eye doctor and primary care doctor for next week. Please bring this discharge summary with you Please follow up with Dr. Leiva tomorrow.  Please Follow up with your GI doctor, Dr. Bucio on 9/15 at 1:15pm Please follow up with Dr. Leiva tomorrow 8/30 at the time you made the appointment.  Please follow up with your GI doctor, Dr. Bucio on 9/15 at 1:15pm. Please see below for phone # and address.

## 2017-08-26 NOTE — DISCHARGE NOTE ADULT - CARE PROVIDER_API CALL
Jimmy Jenkins), Internal Medicine; Pulmonary Disease  [address] [facility], [place] [address]  Phone: [phone]  Fax: [phone] Jimmy Jenkins), Internal Medicine; Pulmonary Disease  [address]   [address] [place] [address]  Phone: [phone]  Fax: [phone]    Luis Alfredo Bucio), Medicine  [address]  [address]  [place] [address]  Phone: [phone]  Fax: [phone]    Oni Leiva, opthalmology)  [address] [place] [address]  Phone: [phone]  Fax: (   )    -

## 2017-08-26 NOTE — PROGRESS NOTE ADULT - SUBJECTIVE AND OBJECTIVE BOX
INTERVAL HPI/OVERNIGHT EVENTS:    severe nausea  attempted to vomit but stomach was empty    PAST MEDICAL & SURGICAL HISTORY:  GERD (gastroesophageal reflux disease)  Other hyperlipidemia  Other glaucoma of both eyes  History of cholecystectomy    FAMILY HISTORY:  No pertinent family history in first degree relatives    SOCIAL HISTORY:    REVIEW OF SYSTEMS:  worried about his left eye  - CP  - SOB  + cough  + anxiety about glaucoma    Vital Signs Last 24 Hrs  T(C): 36.7 (26 Aug 2017 08:53), Max: 46.1 (26 Aug 2017 05:05)  T(F): 98.1 (26 Aug 2017 08:53), Max: 115 (26 Aug 2017 05:05)  HR: 60 (26 Aug 2017 08:53) (60 - 83)  BP: 118/69 (26 Aug 2017 08:53) (115/68 - 148/82)  BP(mean): --  RR: 18 (26 Aug 2017 08:53) (18 - 19)  SpO2: 97% (26 Aug 2017 08:53) (95% - 98%)    I&O's Detail    25 Aug 2017 07:01  -  26 Aug 2017 07:00  --------------------------------------------------------  IN:    sodium chloride 0.45%.: 1100 mL    sodium chloride 0.9%: 125 mL  Total IN: 1225 mL    OUT:  Total OUT: 0 mL    Total NET: 1225 mL      26 Aug 2017 07:01  -  26 Aug 2017 14:05  --------------------------------------------------------  IN:    sodium chloride 0.45%.: 600 mL  Total IN: 600 mL    OUT:  Total OUT: 0 mL    Total NET: 600 mL    PHYSICAL EXAM:  IV FLUID  in bed, somewhat confused  Well nourished, well developed, comfortable, - acute distress; vital signs are monitored continuously  Eyes: PERRLA, EOMI, + LEFT conjunctivitis, -scleritis   Head: no focal deficit, normocephalic,  no trauma  ENMT: moist tongue, no thrush, -nasal discharge, -hoarseness, normal hearing, -cough, -hemoptysis, trachea midline  Neck: supple, - lymphadenopathy,  -masses, -JVD  Respiratory: bilateral diminished breath sounds, -wheezing, -rhonchi, -rales, -crackles  Chest: -accessory muscle use, -paradoxical breathing  Cardiovascular: regular rate and sinus rhythm, S1 S2 normal, -S3, -S4, -murmur, -gallop, -rub  Gastrointestinal: soft, MILDLY tender, nondistended, normal bowel sounds, no hepatosplenomegaly, HE IS VOMITING WITH MILD PALPATION OF THE ABDOMEN  Genitourinary: -flank pain, -dysuria  Extremities: -clubbing, -cyanosis, -edema    Vascular: peripheral pulses palpable 2+ bilaterally  Neurological: alert, oriented x TWO, no focal deficit, -tremor  CONFUSED  Skin: warm, dry, -erythema, iv site intact  Lymph nodes; no cervical, supraclavicular or axillary adenopathy  Psychiatric: cooperative, appropriate mood    MEDICATIONS  (STANDING):  simvastatin 40 milliGRAM(s) Oral at bedtime  latanoprost 0.005% Ophthalmic Solution 1 Drop(s) Both EYES at bedtime  pantoprazole    Tablet 40 milliGRAM(s) Oral before breakfast  heparin  Injectable 5000 Unit(s) SubCutaneous every 8 hours  sodium chloride 0.45%. 1000 milliLiter(s) (100 mL/Hr) IV Continuous <Continuous>    MEDICATIONS  (PRN):  aluminum hydroxide/magnesium hydroxide/simethicone Suspension 30 milliLiter(s) Oral every 4 hours PRN Dyspepsia  acetaminophen   Tablet 650 milliGRAM(s) Oral every 6 hours PRN moderate pain 4-6    Allergies    No Known Allergies    Intolerances      LABS:                        12.6   6.1   )-----------( 273      ( 26 Aug 2017 05:49 )             37.7         134<L>  |  99  |  16  ----------------------------<  104<H>  4.7   |  23  |  1.60<H>    Ca    8.8      26 Aug 2017 05:49  Phos  3.9       Mg     2.1       TPro  8.0  /  Alb  4.1  /  TBili  0.7  /  DBili  x   /  AST  24  /  ALT  17  /  AlkPhos  139<H>  08-25    Urinalysis Basic - ( 25 Aug 2017 12:18 )  Color: Yellow / Appearance: Clear / S.010 / pH: x  Gluc: x / Ketone: NEGATIVE  / Bili: NEGATIVE / Urobili: 0.2 E.U./dL   Blood: x / Protein: NEGATIVE mg/dL / Nitrite: NEGATIVE   Leuk Esterase: Trace / RBC: < 5 /HPF / WBC < 5 /HPF   Sq Epi: x / Non Sq Epi: Rare /HPF / Bacteria: Present /HPF    +DVT prophylaxis  5000 q8  +Sleep NO SLEEP FOR TWO NIGHTS  +Nutrition goals NPO  -Pain  DENIED  -Decubital ulcer  +GI prophylaxis (PPV, coagulopathy, Hx)  PPI  +Aspiration prophylaxis (45 degrees)  +Sedation/analgesia stopped once  +ID (phos, CH, mupi, SB)  -Delirium  +Cardiac  statin  +Prevention  +Education  +Medication reviewed (drug-drug interactions, PDA)  Medical devices  Discussed with PGY, CCRN, family    RADIOLOGY & ADDITIONAL STUDIES:    CXR reviewed - no infiltrates    EXAM:  CT ABDOMEN AND PELVIS                          PROCEDURE DATE:  2017             INTERPRETATION:  CT of the abdomen and pelvis without intravenous   contrast dated 2017 1:29 PM    INDICATION: Abdominal pain, nausea and vomiting for one week.    TECHNIQUE: CT of the abdomen and pelvis was performed using oral contrast   material.  Intravenous contrast was not used , as requested. Axial and   coronal images were produced and reviewed.    PRIOR STUDIES: None.    FINDINGS:Images of the lower chest demonstrate coronary artery   calcifications. Interlobular and intralobular septal thickening in the   lower lobes in a subpleural distribution which could represent early   interstitial lung disease.    Assessment of the solid abdominal organs is limited without the use of IV   contrast.  Within those limitations, hepatic calcifications are seen   consistent with old granulomatous disease. 1.3 x 0.7 cm ovoid opacity   over the high density in the duodenum in the region of the major duodenal   papilla which could represent undigested medication or food contents in   the bowel versus a stone in the ampulla. Status post cholecystectomy. The   CBD measures 1.1 cm in diameter which is borderline dilated   postcholecystectomy. Splenic capsular calcification.    No adrenal abnormalities are seen.   Absent right kidney, correlation   with surgical history is recommended. Lobulated left renal contour which   may be due to renal scarring. Mild left perinephric fat stranding. No   left hydronephrosis or left nephrolithiasis.    No abdominal aortic aneurysm is seen. Mild atherosclerotic calcifications   of the abdominal aorta. Duplicated inferior vena cava. No retroperitoneal   lymphadenopathy is seen.    Evaluation of bowel is limited due to paucity of oral contrast. Within   this limitation, there is no bowel obstruction or free air. Colonic   diverticulosis. Normal appendix. Small hiatal hernia. Underdistention   versus gastric wall thickening. No ascites is evident.     Images of the pelvis demonstrate borderline prostatomegaly. Prostatic   calcifications. Asymmetric seminal vesicles left smaller than the right.   Small bilateral fat-containing inguinal hernias. Tiny fat-containing   umbilical hernia. No stones are seen in the urinary bladder.  No pelvic   lymphadenopathy is seen.    Evaluation of the osseous structures demonstrates degenerative changes of   the spine, hips, SI joints. Patchy lucencies throughout the bones which   likely represent patchyosteoporosis.    IMPRESSION:  Colonic diverticulosis. Small hiatal hernia. Gastric wall thickening   versus underdistention.    Status post cholecystectomy. 1.2 cm focal area of high density in the   duodenum in the region of the major duodenal papilla which could   represent undigested food or medications, however MRCP would be helpful   to exclude ampullary stones.    Absent right kidney, correlation with surgical history is recommended.

## 2017-08-26 NOTE — DISCHARGE NOTE ADULT - HOSPITAL COURSE
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 Per patient, he has had Nausea and dry heaving for the last 2 years but overnight, his sx worsened and he dry heaved "hundreds of times." Patient describes the discomft as "gas like" in the epigastric region with associated belching with minor relief. Pt cant identify exacerbating/alleviating sx. Patient's last BM was while in the ED. Patient endorsed decrease PO intake over the last couple of days 2/2 to worsening nausea; currently, he feels very thirsty. THE PAIN WAS SO BAD THAT HE THOUGHT ABOUT SUICIDE. LIKE A KNIFE Pt denies Abd or back pain. Patient w/o any other complaints. Of note, patient have recent cataract surgery (Aug 10) and tolerated the procedure well, has been compliant with prescribed drops.   Pt denies chest pain, palpitations, lightheadedness SOB, HA, numbness tingling, recent weight loss/gain, dysuria, fever/chills.     In the ED, VS T 37 137/76  RR18 (96% RA). Labs significant for hyponatremia (127). Patient given 1L NS Bolus and started on NS @125cc/Hr. S/p Zofran 4mg IVP and famotidine 20mg IVP    8/26 ate breakfast, felt nauseous after with dry heaving, us ordered of belly, neuro wanted ct of head due to waxing and waning mental staus, baseline is [id number]. 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 ED with 2 years of N/V. Per patient, he has had Nausea and dry heaving for the last 2 years but overnight, his sx worsened and he dry heaved "hundreds of times." Patient describes the discomft as "gas like" in the epigastric region with associated belching with minor relief. Pt cant identify exacerbating/alleviating sx. Patient's last BM was while in the ED. Patient endorsed decrease PO intake over the last couple of days 2/2 to worsening nausea; currently, he feels very thirsty. THE PAIN WAS SO BAD THAT HE THOUGHT ABOUT SUICIDE. LIKE A KNIFE Pt denies Abd or back pain. Patient w/o any other complaints. Of note, patient have recent cataract surgery (Aug 10) and tolerated the procedure well, has been compliant with prescribed drops.   Pt denies chest pain, palpitations, lightheadedness SOB, HA, numbness tingling, recent weight loss/gain, dysuria, fever/chills.     In the ED, VS T 37 137/76  RR18 (96% RA). Labs significant for hyponatremia (127). Patient given 1L NS Bolus and started on NS @125cc/Hr. S/p Zofran 4mg IVP and famotidine 20mg IVP    8/26 ate breakfast, felt nauseous after with dry heaving, us ordered of belly, neuro wanted ct of head due to waxing and waning mental staus, baseline is [id number]. 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 ED with 2 years of N/V with poor po intake. In the ED, VS T 37 137/76  RR18 (96% RA). Labs significant for hyponatremia (127). Patient given 1L NS Bolus and started on NS @125cc/Hr. S/p Zofran 4mg IVP and famotidine 20mg IVP. During the patients admission, the nausea was waxing and waning. Thiamine was administered, TSH and B12 were administered. CT scan showed gastric wall thickening and ultrasound was benign with no signs of stones. Your PO intake increased during your admission and you were able to tolerate food as well as no more episodes of vomiting The patient had episodes of dry heaving that decreased substantially over his stay. GI doctor saw you in the hospital, and wanted to do a follow up EGD and MRCP outpatient as there was no acute findings. A follow up appt has been made with Dr. Bucio. The pt also had blurry vision and is s/p recent cataract surgery. The ophthalmologist saw the patient in the hospital and noted increase intraocular pressure but no acute     8/26 ate breakfast, felt nauseous after with dry heaving, us ordered of belly, neuro wanted ct of head due to waxing and waning mental staus, baseline is [id number]. 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 In the ED, VS T 37 137/76  RR18 (96% RA). Labs significant for hyponatremia (127). Patient given 1L NS Bolus and started on NS @125cc/Hr. S/p Zofran 4mg IVP and famotidine 20mg IVP. During the patients admission, the nausea was waxing and waning. Thiamine was administered, TSH and B12 were administered. CT scan showed gastric wall thickening and ultrasound was benign with no signs of stones. Your PO intake increased during your admission and you were able to tolerate food as well as no more episodes of vomiting The patient had episodes of dry heaving that decreased substantially over his stay. GI doctor saw you in the hospital, and wanted to do a follow up EGD and MRCP outpatient as there was no acute findings. A follow up appt has been made with Dr. Bucio. The pt also had blurry vision and is s/p recent cataract surgery. The ophthalmologist saw the patient in the hospital and noted increase intraocular pressure but no acute pathology. The patient has a follow up appointment with Dr. Leiva tomorrow. Lastly the patient was believed to have AMS and a head CT was ordered, which was negative. However, upon talking to the patient, he notes that he was just too tired to answer the questions and has been aOOx3 the rest of the hospitalization AMS was not present, nor a diagnosis. Due to the chronicity of the nausea, no more vomiting, increased PO intake and being HD stable, the patient is safe for discharge. 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 In the ED, VS T 37 137/76  RR18 (96% RA). Labs significant for hyponatremia (127). Patient given 1L NS Bolus and started on NS @125cc/Hr. S/p Zofran 4mg IVP and famotidine 20mg IVP. During the patients admission, the nausea was waxing and waning. Thiamine was administered, TSH and B12 were administered. CT scan showed gastric wall thickening and ultrasound was benign with no signs of stones. Your PO intake increased during your admission and you were able to tolerate food as well as no more episodes of vomiting The patient had episodes of dry heaving that decreased substantially over his stay. GI doctor saw you in the hospital, and wanted to do a follow up EGD and MRCP outpatient as there was no acute findings. The pt also had blurry vision and is s/p recent cataract surgery. The ophthalmologist saw the patient in the hospital and noted increase intraocular pressure but no acute pathology. Lastly the patient was believed to have AMS and a head CT was ordered, which was negative. However, upon talking to the patient, he notes that he was just too tired to answer the questions and has been aOOx3 the rest of the hospitalization AMS was not present, nor a diagnosis. Due to the chronicity of the nausea, no more vomiting, increased PO intake and being HD stable, the patient is safe for discharge with a follow up appointment with Dr. Leiva tomorrow 8/29, and a follow up appt has been made with Dr. Bucio (GI).

## 2017-08-26 NOTE — DISCHARGE NOTE ADULT - MEDICATION SUMMARY - MEDICATIONS TO TAKE
I will START or STAY ON the medications listed below when I get home from the hospital:    simvastatin 40 mg oral tablet  -- 1 tab(s) by mouth once a day (at bedtime)  -- Indication: For Hyperlipidemia    fluticasone 50 mcg/inh nasal spray  -- 1 spray(s) into nose once a day  -- Indication: For Postnasal discharge    brimonidine 0.2% ophthalmic solution  -- 1 drop(s) to each affected eye 2 times a day  -- Indication: For Glaucoma     dorzolamide-timolol 2%-0.5% preservative-free ophthalmic solution  -- 1 drop(s) to each affected eye 2 times a day  -- Indication: For Glaucoma    Lotemax 0.5% ophthalmic gel  -- 1 drop(s) to each affected eye 4 times a day  -- Indication: For Glaucoma    latanoprost 0.005% ophthalmic solution  -- 1 drop(s) to each affected eye once a day (in the evening)  -- Indication: For Glaucoma    omeprazole 20 mg oral delayed release capsule  -- 1 cap(s) by mouth once a day  -- Indication: For GERD (gastroesophageal reflux disease)

## 2017-08-26 NOTE — PROGRESS NOTE ADULT - PROBLEM SELECTOR PLAN 7
F: Pen BMP, consider maintenance  E: Replete electrolytes PRN goal K>2 Mg>4  N: Regular Diet F: 100cc/hr NS  E: Replete electrolytes PRN goal K>2 Mg>4  N: Regular Diet s/p cataract surgery Aug 10. Currently w/o complaints.   - c/w home Latanoprost 0.005% solution and other home eye drops, pharmacy approved

## 2017-08-26 NOTE — DIETITIAN INITIAL EVALUATION ADULT. - OTHER INFO
Weight hx n/a , skin - intact , no pain , no n/v/d/c ,  no food allergys , self feed ,  tray set up as  needed , n/v x 2 years

## 2017-08-26 NOTE — DIETITIAN INITIAL EVALUATION ADULT. - NS AS NUTRI INTERV MEALS SNACK
ensure clear provided bid  while pt receiving a clear liquid diet  6.8 oz  200 kcal ,  7 gram protein

## 2017-08-26 NOTE — PROGRESS NOTE ADULT - PROBLEM SELECTOR PLAN 2
Na 127, likely hypovolemic hyponatremia in the setting of decreased PO intake. Pt AOx3. s/p 1L NS  - recheck BMP, consider starting maintenance fluids 120 cc/Hr; monitor with frequent lung checks   - f/u Urine lytes to calc FENa  - continue to encourage PO intake Currently 135, resolved.  - recheck BMP in am, considering poor po intake even though pt ate full breakfast; monitor with frequent lung checks due to 100cc/hr NS  - f/u Urine lytes to calc FENa  - continue to encourage PO intake

## 2017-08-26 NOTE — PROGRESS NOTE ADULT - PROBLEM SELECTOR PLAN 6
s/p cataract surgery Aug 10. Currently w/o complaints.   - c/w home Latanoprost 0.005% solution s/p cataract surgery Aug 10. Currently w/o complaints.   - c/w home Latanoprost 0.005% solution and other home eye drops, pharmacy approved - c/w simvastatin 40mg

## 2017-08-26 NOTE — DIETITIAN INITIAL EVALUATION ADULT. - NS AS NUTRI INTERV ED CONTENT2
Other (specify)/try to meet with family if available  patient alert / confused/Purpose of the nutrition education

## 2017-08-26 NOTE — DISCHARGE NOTE ADULT - CARE PROVIDERS DIRECT ADDRESSES
,DirectAddress_Unknown ,DirectAddress_Unknown,[email or web address],DirectAddress_Unknown

## 2017-08-26 NOTE — DISCHARGE NOTE ADULT - SECONDARY DIAGNOSIS.
Altered mental state LORENZO (acute kidney injury) Hyponatremia Postnasal discharge Conjunctivitis Other glaucoma of both eyes

## 2017-08-26 NOTE — DIETITIAN INITIAL EVALUATION ADULT. - PROBLEM SELECTOR PLAN 2
Na 127, likely hypovolemic hyponatremia in the setting of decreased PO intake. Pt AOx3. s/p 1L NS  - recheck BMP, consider starting maintenance fluids 120 cc/Hr; monitor with frequent lung checks   - f/u Urine lytes to calc FENa  - continue to encourage PO intake

## 2017-08-26 NOTE — DISCHARGE NOTE ADULT - PLAN OF CARE
Discharge to home. Stable. You presented with increasing nausea and vomiting at home. You had episodes of dry heaving in the hospital, and the nausea was constant, worse after you ate. A CT scan showed some gastric wall thickening, but was otherwise negative. The abdominal ultrasound that was done was negative as well. GI was consulted during your visit and suggests and endoscopy or MRCP outpatient. During your stay, the nausea improved and you can tolerate more oral intake. An appointment has been made with Dr. Bucio. Please follow up for more testing regarding the nausea. Stable. You had blurry vision during your admission in the hospital. An opthamologist saw you in the hospital, and notes you had increased ocular pressure, but nothing acute. he switched your eye drops to both eyes. Please follow up with Dr. Leiva tomorrow for your eye check. Resolving. You presented with mildly elevated kidney function tests. During your admission, you received fluids and your kidney function tests remained stable. Please follow up with your PMD. You presented with a mildly decreased sodium. Your sodium level remained unchanged during your admission and you were asymptomatic. Please follow with your PMD. You complained of post nasal drip during your admission. You were given Flonase and saline washes and noted that your symptoms improved.

## 2017-08-26 NOTE — PROGRESS NOTE ADULT - PROBLEM SELECTOR PLAN 3
Cr on admission 1.7. Likely pre-renal in the setting of decreased PO and signs of hypovolemia MISSING RIGHT KIDNEY  - recheck BMP, consider starting maintenance and encourage PO intake/hydration  - f/u urine lytes and calc FENa Cr on admission 1.7. Likely pre-renal in the setting of decreased PO and signs of hypovolemia MISSING RIGHT KIDNEY  - trend Cr  - f/u urine lytes and calc FENa Pt has waxing and waning mental status. AOOx1 to AOOx2 throughout the day. Seems confused as to what time period it is.   -Ordered TSH, B12, Folate, and head CT per neuro recs  -Thiamine adminstered due to distant history of alcohol abuse but unclear if currently uses  -will continue to monitor mental status

## 2017-08-26 NOTE — DIETITIAN INITIAL EVALUATION ADULT. - PROBLEM SELECTOR PLAN 1
Pt c/o worsening nausea x2 years, with dry-heaving o/n. Pt  burp/dry heave on palpation epigastric region. No likely infectious in origin. CT Abd demonstrates evidence of hiatal hernia.   - Given QTc 479, try to avoid antiemetics; if required recheck ECG after given  - Antacids and PPI

## 2017-08-26 NOTE — DISCHARGE NOTE ADULT - PROVIDER TOKENS
TOKEN:'[id number]' TOKEN:'[id number]',TOKEN:'[id number]',FREE:[LAST:[Cali],FIRST:[Oni ADDISON, opthalmology)],PHONE:[phone]],FAX:[(   )    -],ADDRESS:[[address] [place] [address]]]

## 2017-08-26 NOTE — PROGRESS NOTE ADULT - ASSESSMENT
82y M with PMHx of GERD, recent cataract surgery and h/o cholecystectomy (2016) presented to [facility] ED dry heaving overnight, worsening of his baseline nausea x 2years.     IV FLUID  D/C MRCP    ultrasound proximal pancreas 82y M with PMHx of GERD, recent cataract surgery and h/o cholecystectomy (2016) presented to [facility] ED dry heaving overnight, worsening of his baseline nausea x 2years. Admitted for chronic nausea with poor PO intake

## 2017-08-26 NOTE — DISCHARGE NOTE ADULT - PATIENT PORTAL LINK FT
“You can access the [facility] Patient Portal, offered by [facility], by registering with the following website: [email or web address]”

## 2017-08-27 LAB
ANION GAP SERPL CALC-SCNC: 11 MMOL/L — SIGNIFICANT CHANGE UP (ref 5–17)
BUN SERPL-MCNC: 12 MG/DL — SIGNIFICANT CHANGE UP (ref 7–23)
CALCIUM SERPL-MCNC: 8.8 MG/DL — SIGNIFICANT CHANGE UP (ref 8.4–10.5)
CHLORIDE SERPL-SCNC: 98 MMOL/L — SIGNIFICANT CHANGE UP (ref 96–108)
CO2 SERPL-SCNC: 21 MMOL/L — LOW (ref 22–31)
CREAT SERPL-MCNC: 1.4 MG/DL — HIGH (ref 0.5–1.3)
GLUCOSE SERPL-MCNC: 108 MG/DL — HIGH (ref 70–99)
HCT VFR BLD CALC: 34.8 % — LOW (ref 39–50)
HGB BLD-MCNC: 11.9 G/DL — LOW (ref 13–17)
MAGNESIUM SERPL-MCNC: 1.8 MG/DL — SIGNIFICANT CHANGE UP (ref 1.6–2.6)
MCHC RBC-ENTMCNC: 30.7 PG — SIGNIFICANT CHANGE UP (ref 27–34)
MCHC RBC-ENTMCNC: 34.2 G/DL — SIGNIFICANT CHANGE UP (ref 32–36)
MCV RBC AUTO: 89.9 FL — SIGNIFICANT CHANGE UP (ref 80–100)
PHOSPHATE SERPL-MCNC: 3.2 MG/DL — SIGNIFICANT CHANGE UP (ref 2.5–4.5)
PLATELET # BLD AUTO: 269 K/UL — SIGNIFICANT CHANGE UP (ref 150–400)
POTASSIUM SERPL-MCNC: 4.5 MMOL/L — SIGNIFICANT CHANGE UP (ref 3.5–5.3)
POTASSIUM SERPL-SCNC: 4.5 MMOL/L — SIGNIFICANT CHANGE UP (ref 3.5–5.3)
RBC # BLD: 3.87 M/UL — LOW (ref 4.2–5.8)
RBC # FLD: 12.3 % — SIGNIFICANT CHANGE UP (ref 10.3–16.9)
SODIUM SERPL-SCNC: 130 MMOL/L — LOW (ref 135–145)
TSH SERPL-MCNC: 1.23 UIU/ML — SIGNIFICANT CHANGE UP (ref 0.35–4.94)
WBC # BLD: 5.9 K/UL — SIGNIFICANT CHANGE UP (ref 3.8–10.5)
WBC # FLD AUTO: 5.9 K/UL — SIGNIFICANT CHANGE UP (ref 3.8–10.5)

## 2017-08-27 PROCEDURE — 70450 CT HEAD/BRAIN W/O DYE: CPT | Mod: 26

## 2017-08-27 RX ORDER — MAGNESIUM SULFATE 500 MG/ML
1 VIAL (ML) INJECTION ONCE
Qty: 0 | Refills: 0 | Status: COMPLETED | OUTPATIENT
Start: 2017-08-27 | End: 2017-08-27

## 2017-08-27 RX ORDER — LANOLIN ALCOHOL/MO/W.PET/CERES
3 CREAM (GRAM) TOPICAL ONCE
Qty: 0 | Refills: 0 | Status: COMPLETED | OUTPATIENT
Start: 2017-08-27 | End: 2017-08-27

## 2017-08-27 RX ADMIN — LOTEPREDNOL ETABONATE 1 DROP(S): 2 SUSPENSION/ DROPS OPHTHALMIC at 21:15

## 2017-08-27 RX ADMIN — LOTEPREDNOL ETABONATE 1 DROP(S): 2 SUSPENSION/ DROPS OPHTHALMIC at 05:12

## 2017-08-27 RX ADMIN — Medication 100 GRAM(S): at 08:31

## 2017-08-27 RX ADMIN — SODIUM CHLORIDE 100 MILLILITER(S): 9 INJECTION, SOLUTION INTRAVENOUS at 17:09

## 2017-08-27 RX ADMIN — HEPARIN SODIUM 5000 UNIT(S): 5000 INJECTION INTRAVENOUS; SUBCUTANEOUS at 05:12

## 2017-08-27 RX ADMIN — DORZOLAMIDE HYDROCHLORIDE TIMOLOL MALEATE 1 DROP(S): 20; 5 SOLUTION/ DROPS OPHTHALMIC at 05:10

## 2017-08-27 RX ADMIN — Medication 30 MILLILITER(S): at 21:13

## 2017-08-27 RX ADMIN — Medication 650 MILLIGRAM(S): at 17:18

## 2017-08-27 RX ADMIN — DORZOLAMIDE HYDROCHLORIDE TIMOLOL MALEATE 1 DROP(S): 20; 5 SOLUTION/ DROPS OPHTHALMIC at 17:18

## 2017-08-27 RX ADMIN — LATANOPROST 1 DROP(S): 0.05 SOLUTION/ DROPS OPHTHALMIC; TOPICAL at 21:15

## 2017-08-27 RX ADMIN — HEPARIN SODIUM 5000 UNIT(S): 5000 INJECTION INTRAVENOUS; SUBCUTANEOUS at 13:06

## 2017-08-27 RX ADMIN — Medication 1 SPRAY(S): at 11:10

## 2017-08-27 RX ADMIN — LOTEPREDNOL ETABONATE 1 DROP(S): 2 SUSPENSION/ DROPS OPHTHALMIC at 13:07

## 2017-08-27 RX ADMIN — Medication 3 MILLIGRAM(S): at 04:43

## 2017-08-27 RX ADMIN — HEPARIN SODIUM 5000 UNIT(S): 5000 INJECTION INTRAVENOUS; SUBCUTANEOUS at 21:12

## 2017-08-27 RX ADMIN — Medication 102 MILLIGRAM(S): at 11:10

## 2017-08-27 RX ADMIN — SIMVASTATIN 40 MILLIGRAM(S): 20 TABLET, FILM COATED ORAL at 21:12

## 2017-08-27 RX ADMIN — SODIUM CHLORIDE 100 MILLILITER(S): 9 INJECTION, SOLUTION INTRAVENOUS at 05:12

## 2017-08-27 RX ADMIN — BRIMONIDINE TARTRATE 1 DROP(S): 2 SOLUTION/ DROPS OPHTHALMIC at 17:09

## 2017-08-27 RX ADMIN — PANTOPRAZOLE SODIUM 40 MILLIGRAM(S): 20 TABLET, DELAYED RELEASE ORAL at 05:12

## 2017-08-27 RX ADMIN — BRIMONIDINE TARTRATE 1 DROP(S): 2 SOLUTION/ DROPS OPHTHALMIC at 05:12

## 2017-08-27 NOTE — PROGRESS NOTE ADULT - SUBJECTIVE AND OBJECTIVE BOX
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 Per patient, he has had Nausea and dry heaving for the last 2 years but overnight, his sx worsened and he dry heaved "hundreds of times." Patient describes the discomft as "gas like" in the epigastric region with associated belching with minor relief. Pt cant identify exacerbating/alleviating sx. Patient's last BM was while in the ED. Patient endorsed decrease PO intake over the last couple of days 2/2 to worsening nausea; currently, he feels very thirsty. THE PAIN WAS SO BAD THAT HE THOUGHT ABOUT SUICIDE. LIKE A KNIFE Pt denies Abd or back pain. Patient w/o any other complaints. Of note, patient have recent cataract surgery (Aug 10) and tolerated the procedure well, has been compliant with prescribed drops.   Pt denies chest pain, palpitations, lightheadedness SOB, HA, numbness tingling, recent weight loss/gain, dysuria, fever/chills.     In the ED, VS T 37 137/76  RR18 (96% RA). Labs significant for hyponatremia (127). Patient given 1L NS Bolus and started on NS @125cc/Hr. S/p Zofran 4mg IVP and famotidine 20mg IVP (25 Aug 2017 15:46)      Interval History - poor memory- no new weakness - no numbness or tingling        REVIEW OF SYSTEMS    Vital Signs Last 24 Hrs  T(C): 36.2 (27 Aug 2017 08:19), Max: 36.9 (27 Aug 2017 05:49)  T(F): 97.2 (27 Aug 2017 08:19), Max: 98.4 (27 Aug 2017 05:49)  HR: 60 (27 Aug 2017 08:19) (60 - 75)  BP: 106/72 (27 Aug 2017 08:19) (106/72 - 138/81)  BP(mean): --  RR: 20 (27 Aug 2017 08:19) (20 - 21)  SpO2: 97% (27 Aug 2017 08:19) (95% - 97%)    Physical Exam-     Mental Status- awake- poor memory    Cranial Nerves- full EOM    Gait and station- no foot drop    Motor- moves all 4 extremities    Reflexes- decreased    Sensation- no sensory level    Coordination- no dysmetria    Vascular -no bruits    Medications  simvastatin 40 milliGRAM(s) Oral at bedtime  latanoprost 0.005% Ophthalmic Solution 1 Drop(s) Both EYES at bedtime  aluminum hydroxide/magnesium hydroxide/simethicone Suspension 30 milliLiter(s) Oral every 4 hours PRN  pantoprazole    Tablet 40 milliGRAM(s) Oral before breakfast  heparin  Injectable 5000 Unit(s) SubCutaneous every 8 hours  sodium chloride 0.45%. 1000 milliLiter(s) IV Continuous <Continuous>  acetaminophen   Tablet 650 milliGRAM(s) Oral every 6 hours PRN  sodium chloride 0.65% Nasal 1 Spray(s) Both Nostrils daily  fluticasone propionate 50 MICROgram(s)/spray Nasal Spray 1 Spray(s) Both Nostrils daily  thiamine IVPB 100 milliGRAM(s) IV Intermittent daily  dorzolamide 2%/timolol 0.5% Ophthalmic Solution 1 Drop(s) Left EYE two times a day  brimonidine 0.2% Ophthalmic Solution 1 Drop(s) Left EYE two times a day  loteprednol 0.5% Ophthalmic Suspension 1 Drop(s) Left EYE three times a day      Lab      Radiology    Assessment- R/O [facility]    Plan- as per consult- CT head, EEG, Blood studies-  Will need neuropsychological testing after discharge

## 2017-08-27 NOTE — PHYSICAL THERAPY INITIAL EVALUATION ADULT - ADDITIONAL COMMENTS
Pt. reports livin in an elevator building with ramp, reports exercising 4x/week, denies prior use of assistive device.

## 2017-08-27 NOTE — PROGRESS NOTE ADULT - SUBJECTIVE AND OBJECTIVE BOX
INTERVAL HPI/OVERNIGHT EVENTS:    he is very concerned about his eye pressure and has multiple questions    PAST MEDICAL & SURGICAL HISTORY:  GERD (gastroesophageal reflux disease)  Other hyperlipidemia  Other glaucoma of both eyes  History of cholecystectomy      FAMILY HISTORY:  No pertinent family history in first degree relatives      SOCIAL HISTORY:    REVIEW OF SYSTEMS:  - CP  - SOB  - cough  + nausea  - V  + postnasal drip  - abdominal pain    Vital Signs Last 24 Hrs  T(C): 36.2 (27 Aug 2017 08:19), Max: 36.9 (27 Aug 2017 05:49)  T(F): 97.2 (27 Aug 2017 08:19), Max: 98.4 (27 Aug 2017 05:49)  HR: 60 (27 Aug 2017 08:19) (60 - 75)  BP: 106/72 (27 Aug 2017 08:19) (106/72 - 138/81)  BP(mean): --  RR: 20 (27 Aug 2017 08:19) (20 - 21)  SpO2: 97% (27 Aug 2017 08:19) (95% - 97%)    I&O's Detail    26 Aug 2017 07:01  -  27 Aug 2017 07:00  --------------------------------------------------------  IN:    sodium chloride 0.45%.: 2400 mL  Total IN: 2400 mL    OUT:    Voided: 300 mL  Total OUT: 300 mL    Total NET: 2100 mL      27 Aug 2017 07:01  -  27 Aug 2017 14:11  --------------------------------------------------------  IN:    IV PiggyBack: 200 mL    sodium chloride 0.45%.: 500 mL  Total IN: 700 mL    OUT:  Total OUT: 0 mL    Total NET: 700 mL    PHYSICAL EXAM:  in bed, awaiting CT  congested nose, "the mucus is the big thing"  Well nourished, well developed, uncomfortable, - acute distress; vital signs are monitored  Eyes: PERRLA, EOMI, -conjunctivitis, -scleritis   Head: no focal deficit, normocephalic,  no trauma  ENMT: moist tongue, no thrush, -nasal discharge, -hoarseness, normal hearing, + cough, MUCUS, -hemoptysis, trachea midline  Neck: supple, - lymphadenopathy,  -masses, -JVD  Respiratory: bilateral diminished breath sounds, -wheezing, -rhonchi, -rales, -crackles  Chest: -accessory muscle use, -paradoxical breathing  Cardiovascular: regular rate and sinus rhythm, S1 S2 normal, -S3, -S4, -murmur, -gallop, -rub  Gastrointestinal: soft, nontender, nondistended, normal bowel sounds, no hepatosplenomegaly  Genitourinary: -flank pain, -dysuria  Extremities: -clubbing, -cyanosis, -edema    Vascular: peripheral pulses palpable 2+ bilaterally  Neurological: alert, oriented x 3, CONFUSED, no focal deficit, -tremor   Skin: warm, dry, -erythema, iv site intact  Lymph nodes; no cervical, supraclavicular or axillary adenopathy  Psychiatric: cooperative, appropriate mood    MEDICATIONS  (STANDING):  simvastatin 40 milliGRAM(s) Oral at bedtime  latanoprost 0.005% Ophthalmic Solution 1 Drop(s) Both EYES at bedtime  pantoprazole    Tablet 40 milliGRAM(s) Oral before breakfast  heparin  Injectable 5000 Unit(s) SubCutaneous every 8 hours  sodium chloride 0.45%. 1000 milliLiter(s) (100 mL/Hr) IV Continuous <Continuous>  sodium chloride 0.65% Nasal 1 Spray(s) Both Nostrils daily  fluticasone propionate 50 MICROgram(s)/spray Nasal Spray 1 Spray(s) Both Nostrils daily  thiamine IVPB 100 milliGRAM(s) IV Intermittent daily  dorzolamide 2%/timolol 0.5% Ophthalmic Solution 1 Drop(s) Left EYE two times a day  brimonidine 0.2% Ophthalmic Solution 1 Drop(s) Left EYE two times a day  loteprednol 0.5% Ophthalmic Suspension 1 Drop(s) Left EYE three times a day    MEDICATIONS  (PRN):  aluminum hydroxide/magnesium hydroxide/simethicone Suspension 30 milliLiter(s) Oral every 4 hours PRN Dyspepsia  acetaminophen   Tablet 650 milliGRAM(s) Oral every 6 hours PRN moderate pain 4-6      Allergies    No Known Allergies    Intolerances        LABS:                        11.9   5.9   )-----------( 269      ( 27 Aug 2017 05:41 )             34.8     08-27    130<L>  |  98  |  12  ----------------------------<  108<H>  4.5   |  21<L>  |  1.40<H>    Ca    8.8      27 Aug 2017 05:41  Phos  3.2     08-27  Mg     1.8     08-27    +DVT prophylaxis  5000 q8  +Sleep  GOOD  +Nutrition goals  ORAL  -Pain DENIED, FEELS CONGESTED  -Decubital ulcer  +GI prophylaxis (PPV, coagulopathy, Hx)  +Aspiration prophylaxis (45 degrees)  +Sedation/analgesia stopped once  +ID (phos, CH, mupi, SB)  -Delirium  +Cardiac Beta/ACEI-ARB/ASA/statin  +Prevention  +Education  +Medication reviewed (drug-drug interactions, PDA)  Medical devices    Discussed with neurology, PGY, RN    RADIOLOGY & ADDITIONAL STUDIES:

## 2017-08-28 LAB
FOLATE SERPL-MCNC: 14.1 NG/ML — SIGNIFICANT CHANGE UP (ref 4.8–24.2)
VIT B12 SERPL-MCNC: 624 PG/ML — SIGNIFICANT CHANGE UP (ref 243–894)

## 2017-08-28 PROCEDURE — 76700 US EXAM ABDOM COMPLETE: CPT | Mod: 26

## 2017-08-28 PROCEDURE — 93010 ELECTROCARDIOGRAM REPORT: CPT

## 2017-08-28 RX ORDER — ONDANSETRON 8 MG/1
4 TABLET, FILM COATED ORAL ONCE
Qty: 0 | Refills: 0 | Status: COMPLETED | OUTPATIENT
Start: 2017-08-28 | End: 2017-08-28

## 2017-08-28 RX ORDER — LANOLIN ALCOHOL/MO/W.PET/CERES
3 CREAM (GRAM) TOPICAL ONCE
Qty: 0 | Refills: 0 | Status: COMPLETED | OUTPATIENT
Start: 2017-08-28 | End: 2017-08-28

## 2017-08-28 RX ORDER — SIMETHICONE 80 MG/1
80 TABLET, CHEWABLE ORAL ONCE
Qty: 0 | Refills: 0 | Status: COMPLETED | OUTPATIENT
Start: 2017-08-28 | End: 2017-08-28

## 2017-08-28 RX ORDER — LANOLIN ALCOHOL/MO/W.PET/CERES
3 CREAM (GRAM) TOPICAL ONCE
Qty: 0 | Refills: 0 | Status: COMPLETED | OUTPATIENT
Start: 2017-08-28 | End: 2017-08-29

## 2017-08-28 RX ADMIN — Medication 1 SPRAY(S): at 12:24

## 2017-08-28 RX ADMIN — LOTEPREDNOL ETABONATE 1 DROP(S): 2 SUSPENSION/ DROPS OPHTHALMIC at 21:19

## 2017-08-28 RX ADMIN — SIMVASTATIN 40 MILLIGRAM(S): 20 TABLET, FILM COATED ORAL at 21:19

## 2017-08-28 RX ADMIN — LOTEPREDNOL ETABONATE 1 DROP(S): 2 SUSPENSION/ DROPS OPHTHALMIC at 05:17

## 2017-08-28 RX ADMIN — BRIMONIDINE TARTRATE 1 DROP(S): 2 SOLUTION/ DROPS OPHTHALMIC at 05:18

## 2017-08-28 RX ADMIN — DORZOLAMIDE HYDROCHLORIDE TIMOLOL MALEATE 1 DROP(S): 20; 5 SOLUTION/ DROPS OPHTHALMIC at 18:21

## 2017-08-28 RX ADMIN — ONDANSETRON 4 MILLIGRAM(S): 8 TABLET, FILM COATED ORAL at 02:31

## 2017-08-28 RX ADMIN — SIMETHICONE 80 MILLIGRAM(S): 80 TABLET, CHEWABLE ORAL at 00:31

## 2017-08-28 RX ADMIN — LOTEPREDNOL ETABONATE 1 DROP(S): 2 SUSPENSION/ DROPS OPHTHALMIC at 14:00

## 2017-08-28 RX ADMIN — SODIUM CHLORIDE 100 MILLILITER(S): 9 INJECTION, SOLUTION INTRAVENOUS at 02:14

## 2017-08-28 RX ADMIN — Medication 30 MILLILITER(S): at 12:23

## 2017-08-28 RX ADMIN — HEPARIN SODIUM 5000 UNIT(S): 5000 INJECTION INTRAVENOUS; SUBCUTANEOUS at 13:59

## 2017-08-28 RX ADMIN — BRIMONIDINE TARTRATE 1 DROP(S): 2 SOLUTION/ DROPS OPHTHALMIC at 18:13

## 2017-08-28 RX ADMIN — Medication 102 MILLIGRAM(S): at 12:23

## 2017-08-28 RX ADMIN — HEPARIN SODIUM 5000 UNIT(S): 5000 INJECTION INTRAVENOUS; SUBCUTANEOUS at 21:19

## 2017-08-28 RX ADMIN — HEPARIN SODIUM 5000 UNIT(S): 5000 INJECTION INTRAVENOUS; SUBCUTANEOUS at 05:19

## 2017-08-28 RX ADMIN — Medication 3 MILLIGRAM(S): at 00:30

## 2017-08-28 RX ADMIN — LATANOPROST 1 DROP(S): 0.05 SOLUTION/ DROPS OPHTHALMIC; TOPICAL at 21:19

## 2017-08-28 RX ADMIN — DORZOLAMIDE HYDROCHLORIDE TIMOLOL MALEATE 1 DROP(S): 20; 5 SOLUTION/ DROPS OPHTHALMIC at 05:17

## 2017-08-28 RX ADMIN — PANTOPRAZOLE SODIUM 40 MILLIGRAM(S): 20 TABLET, DELAYED RELEASE ORAL at 05:18

## 2017-08-28 NOTE — PROGRESS NOTE ADULT - SUBJECTIVE AND OBJECTIVE BOX
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 Per patient, he has had Nausea and dry heaving for the last 2 years but overnight, his sx worsened and he dry heaved "hundreds of times." Patient describes the discomft as "gas like" in the epigastric region with associated belching with minor relief. Pt cant identify exacerbating/alleviating sx. Patient's last BM was while in the ED. Patient endorsed decrease PO intake over the last couple of days 2/2 to worsening nausea; currently, he feels very thirsty. THE PAIN WAS SO BAD THAT HE THOUGHT ABOUT SUICIDE. LIKE A KNIFE Pt denies Abd or back pain. Patient w/o any other complaints. Of note, patient have recent cataract surgery (Aug 10) and tolerated the procedure well, has been compliant with prescribed drops.   Pt denies chest pain, palpitations, lightheadedness SOB, HA, numbness tingling, recent weight loss/gain, dysuria, fever/chills.     In the ED, VS T 37 137/76  RR18 (96% RA). Labs significant for hyponatremia (127). Patient given 1L NS Bolus and started on NS @125cc/Hr. S/p Zofran 4mg IVP and famotidine 20mg IVP (25 Aug 2017 15:46)      Interval History - reports no memory loss- no headaches or dizziness        REVIEW OF SYSTEMS    Vital Signs Last 24 Hrs  T(C): 36.8 (28 Aug 2017 04:58), Max: 36.8 (27 Aug 2017 20:35)  T(F): 98.2 (28 Aug 2017 04:58), Max: 98.2 (27 Aug 2017 20:35)  HR: 66 (28 Aug 2017 04:58) (62 - 66)  BP: 116/74 (28 Aug 2017 04:58) (116/74 - 130/75)  BP(mean): --  RR: 21 (28 Aug 2017 04:58) (20 - 22)  SpO2: 95% (28 Aug 2017 04:58) (94% - 95%)    Physical Exam-     Mental Status- awake- oriented times 3    Cranial Nerves-nl    Gait and station-no foot drop    Motor- good strength    Reflexes-decreased     Sensation- distal sensory loss    Coordination- no tremors    Vascular -no bruits    Medications  simvastatin 40 milliGRAM(s) Oral at bedtime  latanoprost 0.005% Ophthalmic Solution 1 Drop(s) Both EYES at bedtime  aluminum hydroxide/magnesium hydroxide/simethicone Suspension 30 milliLiter(s) Oral every 4 hours PRN  pantoprazole    Tablet 40 milliGRAM(s) Oral before breakfast  heparin  Injectable 5000 Unit(s) SubCutaneous every 8 hours  acetaminophen   Tablet 650 milliGRAM(s) Oral every 6 hours PRN  sodium chloride 0.65% Nasal 1 Spray(s) Both Nostrils daily  fluticasone propionate 50 MICROgram(s)/spray Nasal Spray 1 Spray(s) Both Nostrils daily  thiamine IVPB 100 milliGRAM(s) IV Intermittent daily  dorzolamide 2%/timolol 0.5% Ophthalmic Solution 1 Drop(s) Left EYE two times a day  brimonidine 0.2% Ophthalmic Solution 1 Drop(s) Left EYE two times a day  loteprednol 0.5% Ophthalmic Suspension 1 Drop(s) Left EYE three times a day      Lab      Radiology    Assessment- R/O [facility]    Plan MRI head, EEG

## 2017-08-28 NOTE — PROGRESS NOTE ADULT - PROBLEM SELECTOR PLAN 9
Dr Keller's advice very much appreciated. Dr Keller's advice very much appreciated. Hold MRI and EEG.

## 2017-08-28 NOTE — PROGRESS NOTE ADULT - PROBLEM SELECTOR PLAN 4
Cr on admission 1.7. Likely pre-renal in the setting of decreased PO and signs of hypovolemia MISSING RIGHT KIDNEY  - trend Cr, currently 1.4 and trending down

## 2017-08-28 NOTE — PROGRESS NOTE ADULT - PROBLEM SELECTOR PLAN 1
Pt c/o worsening nausea x2 years, with dry-heaving o/n. Pt  burp/dry heave on palpation epigastric region. No likely infectious in origin. CT Abd demonstrates evidence of hiatal hernia with gastric thickening, US was benign with no significant finding  -CT showed possible stone in duodenal region (pt s/p cholecystectomy), possible MRCP.

## 2017-08-28 NOTE — PROGRESS NOTE ADULT - PROBLEM SELECTOR PLAN 3
Pt was AOOx3 all day today and yesterday, did not seem to be altered, he states he was just tired of answering questions  -TSH, B12, Folate, and head CT returned WNL  -Thiamine adminstered due to distant history of alcohol abuse but unclear if currently uses  -will continue to monitor mental status

## 2017-08-28 NOTE — PROGRESS NOTE ADULT - PROBLEM SELECTOR PLAN 1
continuing. Seems to worsen. Start ondansetron.GI consult continuing. Seems to worsen. Start ondansetron.GI consult.

## 2017-08-28 NOTE — CONSULT NOTE ADULT - CONSULT REASON
83 year old male admitted for two year history of nausea, vomiting with severe epigastric pain.  Patient with history of chronic open angle glaucoma 83 year old male admitted for two year history of nausea, vomiting with severe epigastric pain.  Patient with history of chronic open angle glaucoma OU, status post cataract extraction with intraocular  lens implant OS 8/10/17.  Patient concerned that visual acuity has changed.  Medications:  Lotemax BID OS  Latanoprost 0.005% qhs OU  Bromonidine BID OS  Cosopt BID OS

## 2017-08-28 NOTE — PROGRESS NOTE ADULT - SUBJECTIVE AND OBJECTIVE BOX
INTERVAL HPI/OVERNIGHT EVENTS:  Patient was seen and examined at bedside. Pt states that he still feels nauseous all the time. Its comes and goes in waves, but he cannot stop dry heaving or feeling nauseous. He also says that he feels like his vision is still blurry, after his recent cataract surgery. He notes that the nausea gets worse with food, and that he still cannot see far. Discussed with patient and daughter that vision may still be blurry due to recent surgery, however they want to see an ophthalmologist. Patient denies: fever, chills, dizziness, weakness, HA, Changes in vision, CP, palpitations, SOB, cough, dysuria, changes in bowel movements, LE edema.    VITAL SIGNS:  T(F): 97.9 (08-28-17 @ 15:51)  HR: 62 (08-28-17 @ 15:51)  BP: 122/65 (08-28-17 @ 15:51)  RR: 17 (08-28-17 @ 15:51)  SpO2: 93% (08-28-17 @ 15:51)  Wt(kg): --    PHYSICAL EXAM:    Constitutional: NAD  Eyes: PERRL, EOMI, sclera non-icteric  Neck: supple, trachea midline, no masses, no JVD  HEENT: MMM  Respiratory: CTA b/l, good air entry b/l, no wheezing, no rhonchi, no rales, without accessory muscle use and no intercostal retractions  Cardiovascular: RRR, normal S1S2, no M/R/G  Gastrointestinal: soft, NTND, no masses palpable, BS normal  Extremities: Warm, well perfused, pulses equal bilateral upper and lower extremities, no edema, no clubbing  Neurological: AAOx3, CN Grossly intact  Skin: Normal temperature, warm, dry    MEDICATIONS  (STANDING):  simvastatin 40 milliGRAM(s) Oral at bedtime  latanoprost 0.005% Ophthalmic Solution 1 Drop(s) Both EYES at bedtime  pantoprazole    Tablet 40 milliGRAM(s) Oral before breakfast  heparin  Injectable 5000 Unit(s) SubCutaneous every 8 hours  sodium chloride 0.65% Nasal 1 Spray(s) Both Nostrils daily  fluticasone propionate 50 MICROgram(s)/spray Nasal Spray 1 Spray(s) Both Nostrils daily  thiamine IVPB 100 milliGRAM(s) IV Intermittent daily  dorzolamide 2%/timolol 0.5% Ophthalmic Solution 1 Drop(s) Left EYE two times a day  brimonidine 0.2% Ophthalmic Solution 1 Drop(s) Left EYE two times a day  loteprednol 0.5% Ophthalmic Suspension 1 Drop(s) Left EYE three times a day    MEDICATIONS  (PRN):  aluminum hydroxide/magnesium hydroxide/simethicone Suspension 30 milliLiter(s) Oral every 4 hours PRN Dyspepsia  acetaminophen   Tablet 650 milliGRAM(s) Oral every 6 hours PRN moderate pain 4-6      Allergies    No Known Allergies    Intolerances        LABS:                        11.9   5.9   )-----------( 269      ( 27 Aug 2017 05:41 )             34.8     08-27    130<L>  |  98  |  12  ----------------------------<  108<H>  4.5   |  21<L>  |  1.40<H>    Ca    8.8      27 Aug 2017 05:41  Phos  3.2     08-27  Mg     1.8     08-27            RADIOLOGY & ADDITIONAL TESTS:

## 2017-08-28 NOTE — PROGRESS NOTE ADULT - ASSESSMENT
82y M with PMHx of GERD, recent cataract surgery and h/o cholecystectomy (2016) presented to [facility] ED dry heaving overnight, worsening of his baseline nausea x 2years. Admitted for chronic nausea with poor PO intake.

## 2017-08-28 NOTE — PROGRESS NOTE ADULT - PROBLEM SELECTOR PLAN 2
Currently 130, trend BMP tomorrow  - recheck BMP in am, considering poor po intake even though pt ate full breakfast  -- continue to encourage PO intake

## 2017-08-28 NOTE — PROGRESS NOTE ADULT - PROBLEM SELECTOR PLAN 7
s/p cataract surgery Aug 10.   - c/w home Latanoprost 0.005% solution and other home eye drops, pharmacy approved  - since his eyes still seem blurry, ophthalmology has been consulted

## 2017-08-28 NOTE — PROGRESS NOTE ADULT - SUBJECTIVE AND OBJECTIVE BOX
INTERVAL HPI/OVERNIGHT EVENTS:    "I feel terrible"  nausea, almost vomiting  throat pain    PAST MEDICAL & SURGICAL HISTORY:  GERD (gastroesophageal reflux disease)  Other hyperlipidemia  Other glaucoma of both eyes  History of cholecystectomy    FAMILY HISTORY:  No pertinent family history in first degree relatives    SOCIAL HISTORY:    REVIEW OF SYSTEMS:  + throat pain  + abdominal pain  - CP  - SOB  - cough    Vital Signs Last 24 Hrs  T(C): 36.7 (28 Aug 2017 10:06), Max: 36.8 (27 Aug 2017 20:35)  T(F): 98 (28 Aug 2017 10:06), Max: 98.2 (27 Aug 2017 20:35)  HR: 63 (28 Aug 2017 10:06) (62 - 66)  BP: 122/69 (28 Aug 2017 10:06) (116/74 - 130/75)  BP(mean): --  RR: 16 (28 Aug 2017 10:06) (16 - 22)  SpO2: 98% (28 Aug 2017 10:06) (94% - 98%)    I&O's Detail    27 Aug 2017 07:01  -  28 Aug 2017 07:00  --------------------------------------------------------  IN:    IV PiggyBack: 200 mL    sodium chloride 0.45%: 2300 mL  Total IN: 2500 mL    OUT:  Total OUT: 0 mL    Total NET: 2500 mL    PHYSICAL EXAM:  nauseous  Well nourished, well developed, comfortable, - acute distress; vital signs are monitored  Eyes: PERRLA, EOMI, -conjunctivitis, -scleritis   Head: no focal deficit, normocephalic,  no trauma  ENMT: moist tongue, no thrush, -nasal discharge, -hoarseness, normal hearing, -cough, -hemoptysis, trachea midline  Neck: supple, - lymphadenopathy,  -masses, -JVD  Respiratory: bilateral diminished breath sounds, -wheezing, -rhonchi, -rales, -crackles  Chest: -accessory muscle use, -paradoxical breathing  Cardiovascular: regular rate and sinus rhythm, S1 S2 normal, -S3, -S4, -murmur, -gallop, -rub  Gastrointestinal: soft, nontender, nondistended, normal bowel sounds, no hepatosplenomegaly  Genitourinary: -flank pain, -dysuria  Extremities: -clubbing, -cyanosis, -edema    Vascular: peripheral pulses palpable 2+ bilaterally  Neurological: alert, oriented x 3, no focal deficit, -tremor   Skin: warm, dry, -erythema, iv site intact  Lymph nodes; no cervical, supraclavicular or axillary adenopathy  Psychiatric: cooperative, appropriate mood, anxious    MEDICATIONS  (STANDING):  simvastatin 40 milliGRAM(s) Oral at bedtime  latanoprost 0.005% Ophthalmic Solution 1 Drop(s) Both EYES at bedtime  pantoprazole    Tablet 40 milliGRAM(s) Oral before breakfast  heparin  Injectable 5000 Unit(s) SubCutaneous every 8 hours  sodium chloride 0.65% Nasal 1 Spray(s) Both Nostrils daily  fluticasone propionate 50 MICROgram(s)/spray Nasal Spray 1 Spray(s) Both Nostrils daily  thiamine IVPB 100 milliGRAM(s) IV Intermittent daily  dorzolamide 2%/timolol 0.5% Ophthalmic Solution 1 Drop(s) Left EYE two times a day  brimonidine 0.2% Ophthalmic Solution 1 Drop(s) Left EYE two times a day  loteprednol 0.5% Ophthalmic Suspension 1 Drop(s) Left EYE three times a day    MEDICATIONS  (PRN):  aluminum hydroxide/magnesium hydroxide/simethicone Suspension 30 milliLiter(s) Oral every 4 hours PRN Dyspepsia  acetaminophen   Tablet 650 milliGRAM(s) Oral every 6 hours PRN moderate pain 4-6    Allergies    No Known Allergies    Intolerances      LABS:                        11.9   5.9   )-----------( 269      ( 27 Aug 2017 05:41 )             34.8     08-27    130<L>  |  98  |  12  ----------------------------<  108<H>  4.5   |  21<L>  |  1.40<H>    Ca    8.8      27 Aug 2017 05:41  Phos  3.2     08-27  Mg     1.8     08-27    +DVT prophylaxis 5000 Q8  +Sleep   +Nutrition goals FEELS VERY NAUSEOUS  -Pain  INTERMITTENT  -Decubital ulcer  +GI prophylaxis (PPV, coagulopathy, Hx)  +Aspiration prophylaxis (45 degrees)  +Sedation/analgesia stopped once  +ID (phos, CH, mupi, SB)  -Delirium MILD  +Cardiac /statin  +Prevention  +Education  +Medication reviewed (drug-drug interactions, PDA)  Medical devices  Discussed with PGY, RN, daughter    RADIOLOGY & ADDITIONAL STUDIES:      EXAM:  CT ABDOMEN AND PELVIS                          PROCEDURE DATE:  08/25/2017         INTERPRETATION:  CT of the abdomen and pelvis without intravenous   contrast dated 8/25/2017 1:29 PM    INDICATION: Abdominal pain, nausea and vomiting for one week.    TECHNIQUE: CT of the abdomen and pelvis was performed using oral contrast   material.  Intravenous contrast was not used , as requested. Axial and   coronal images were produced and reviewed.    PRIOR STUDIES: None.    FINDINGS:Images of the lower chest demonstrate coronary artery   calcifications. Interlobular and intralobular septal thickening in the   lower lobes in a subpleural distribution which could represent early   interstitial lung disease.    Assessment of the solid abdominal organs is limited without the use of IV   contrast.  Within those limitations, hepatic calcifications are seen   consistent with old granulomatous disease. 1.3 x 0.7 cm ovoid opacity   over the high density in the duodenum in the region of the major duodenal   papilla which could represent undigested medication or food contents in   the bowel versus a stone in the ampulla. Status post cholecystectomy. The   CBD measures 1.1 cm in diameter which is borderline dilated   postcholecystectomy. Splenic capsular calcification.    No adrenal abnormalities are seen.   Absent right kidney, correlation   with surgical history is recommended. Lobulated left renal contour which   may be due to renal scarring. Mild left perinephric fat stranding. No   left hydronephrosis or left nephrolithiasis.    No abdominal aortic aneurysm is seen. Mild atherosclerotic calcifications   of the abdominal aorta. Duplicated inferior vena cava. No retroperitoneal   lymphadenopathy is seen.    Evaluation of bowel is limited due to paucity of oral contrast. Within   this limitation, there is no bowel obstruction or free air. Colonic   diverticulosis. Normal appendix. Small hiatal hernia. Underdistention   versus gastric wall thickening. No ascites is evident.     Images of the pelvis demonstrate borderline prostatomegaly. Prostatic   calcifications. Asymmetric seminal vesicles left smaller than the right.   Small bilateral fat-containing inguinal hernias. Tiny fat-containing   umbilical hernia. No stones are seen in the urinary bladder.  No pelvic   lymphadenopathy is seen.    Evaluation of the osseous structures demonstrates degenerative changes of   the spine, hips, SI joints. Patchy lucencies throughout the bones which   likely represent patchyosteoporosis.    IMPRESSION:  Colonic diverticulosis. Small hiatal hernia. Gastric wall thickening   versus underdistention.    Status post cholecystectomy. 1.2 cm focal area of high density in the   duodenum in the region of the major duodenal papilla which could   represent undigested food or medications, however MRCP would be helpful   to exclude ampullary stones.    Absent right kidney, correlation with surgical history is recommended. INTERVAL HPI/OVERNIGHT EVENTS:    "I feel terrible"  nausea, almost vomiting  throat pain    PAST MEDICAL & SURGICAL HISTORY:  GERD (gastroesophageal reflux disease)  Other hyperlipidemia  Other glaucoma of both eyes  History of cholecystectomy    FAMILY HISTORY:  No pertinent family history in first degree relatives    SOCIAL HISTORY:    REVIEW OF SYSTEMS:  + throat pain  + abdominal pain  - CP  - SOB  - cough    Vital Signs Last 24 Hrs  T(C): 36.7 (28 Aug 2017 10:06), Max: 36.8 (27 Aug 2017 20:35)  T(F): 98 (28 Aug 2017 10:06), Max: 98.2 (27 Aug 2017 20:35)  HR: 63 (28 Aug 2017 10:06) (62 - 66)  BP: 122/69 (28 Aug 2017 10:06) (116/74 - 130/75)  BP(mean): --  RR: 16 (28 Aug 2017 10:06) (16 - 22)  SpO2: 98% (28 Aug 2017 10:06) (94% - 98%)    I&O's Detail    27 Aug 2017 07:01  -  28 Aug 2017 07:00  --------------------------------------------------------  IN:    IV PiggyBack: 200 mL    sodium chloride 0.45%: 2300 mL  Total IN: 2500 mL    OUT:  Total OUT: 0 mL    Total NET: 2500 mL    PHYSICAL EXAM:  nauseous  Well nourished, well developed, comfortable, - acute distress; vital signs are monitored  Eyes: PERRLA, EOMI, + LEFT conjunctivitis, -scleritis   Head: no focal deficit, normocephalic,  no trauma  ENMT: moist tongue, no thrush, -nasal discharge, -hoarseness, normal hearing, -cough, -hemoptysis, trachea midline  Neck: supple, - lymphadenopathy,  -masses, -JVD  Respiratory: bilateral diminished breath sounds, -wheezing, -rhonchi, -rales, -crackles  Chest: -accessory muscle use, -paradoxical breathing  Cardiovascular: regular rate and sinus rhythm, S1 S2 normal, -S3, -S4, -murmur, -gallop, -rub  Gastrointestinal: soft, nontender, nondistended, normal bowel sounds, no hepatosplenomegaly  Genitourinary: -flank pain, -dysuria  Extremities: -clubbing, -cyanosis, -edema    Vascular: peripheral pulses palpable 2+ bilaterally  Neurological: alert, oriented x 3, no focal deficit, -tremor   Skin: warm, dry, -erythema, iv site intact  Lymph nodes; no cervical, supraclavicular or axillary adenopathy  Psychiatric: cooperative, appropriate mood, anxious    MEDICATIONS  (STANDING):  simvastatin 40 milliGRAM(s) Oral at bedtime  latanoprost 0.005% Ophthalmic Solution 1 Drop(s) Both EYES at bedtime  pantoprazole    Tablet 40 milliGRAM(s) Oral before breakfast  heparin  Injectable 5000 Unit(s) SubCutaneous every 8 hours  sodium chloride 0.65% Nasal 1 Spray(s) Both Nostrils daily  fluticasone propionate 50 MICROgram(s)/spray Nasal Spray 1 Spray(s) Both Nostrils daily  thiamine IVPB 100 milliGRAM(s) IV Intermittent daily  dorzolamide 2%/timolol 0.5% Ophthalmic Solution 1 Drop(s) Left EYE two times a day  brimonidine 0.2% Ophthalmic Solution 1 Drop(s) Left EYE two times a day  loteprednol 0.5% Ophthalmic Suspension 1 Drop(s) Left EYE three times a day    MEDICATIONS  (PRN):  aluminum hydroxide/magnesium hydroxide/simethicone Suspension 30 milliLiter(s) Oral every 4 hours PRN Dyspepsia  acetaminophen   Tablet 650 milliGRAM(s) Oral every 6 hours PRN moderate pain 4-6    Allergies    No Known Allergies    Intolerances      LABS:                        11.9   5.9   )-----------( 269      ( 27 Aug 2017 05:41 )             34.8     08-27    130<L>  |  98  |  12  ----------------------------<  108<H>  4.5   |  21<L>  |  1.40<H>    Ca    8.8      27 Aug 2017 05:41  Phos  3.2     08-27  Mg     1.8     08-27    +DVT prophylaxis 5000 Q8  +Sleep   +Nutrition goals FEELS VERY NAUSEOUS  -Pain  INTERMITTENT  -Decubital ulcer  +GI prophylaxis (PPV, coagulopathy, Hx)  +Aspiration prophylaxis (45 degrees)  +Sedation/analgesia stopped once  +ID (phos, CH, mupi, SB)  -Delirium MILD  +Cardiac /statin  +Prevention  +Education  +Medication reviewed (drug-drug interactions, PDA)  Medical devices  Discussed with PGY, RN, daughter    RADIOLOGY & ADDITIONAL STUDIES:      EXAM:  CT ABDOMEN AND PELVIS                          PROCEDURE DATE:  08/25/2017         INTERPRETATION:  CT of the abdomen and pelvis without intravenous   contrast dated 8/25/2017 1:29 PM    INDICATION: Abdominal pain, nausea and vomiting for one week.    TECHNIQUE: CT of the abdomen and pelvis was performed using oral contrast   material.  Intravenous contrast was not used , as requested. Axial and   coronal images were produced and reviewed.    PRIOR STUDIES: None.    FINDINGS:Images of the lower chest demonstrate coronary artery   calcifications. Interlobular and intralobular septal thickening in the   lower lobes in a subpleural distribution which could represent early   interstitial lung disease.    Assessment of the solid abdominal organs is limited without the use of IV   contrast.  Within those limitations, hepatic calcifications are seen   consistent with old granulomatous disease. 1.3 x 0.7 cm ovoid opacity   over the high density in the duodenum in the region of the major duodenal   papilla which could represent undigested medication or food contents in   the bowel versus a stone in the ampulla. Status post cholecystectomy. The   CBD measures 1.1 cm in diameter which is borderline dilated   postcholecystectomy. Splenic capsular calcification.    No adrenal abnormalities are seen.   Absent right kidney, correlation   with surgical history is recommended. Lobulated left renal contour which   may be due to renal scarring. Mild left perinephric fat stranding. No   left hydronephrosis or left nephrolithiasis.    No abdominal aortic aneurysm is seen. Mild atherosclerotic calcifications   of the abdominal aorta. Duplicated inferior vena cava. No retroperitoneal   lymphadenopathy is seen.    Evaluation of bowel is limited due to paucity of oral contrast. Within   this limitation, there is no bowel obstruction or free air. Colonic   diverticulosis. Normal appendix. Small hiatal hernia. Underdistention   versus gastric wall thickening. No ascites is evident.     Images of the pelvis demonstrate borderline prostatomegaly. Prostatic   calcifications. Asymmetric seminal vesicles left smaller than the right.   Small bilateral fat-containing inguinal hernias. Tiny fat-containing   umbilical hernia. No stones are seen in the urinary bladder.  No pelvic   lymphadenopathy is seen.    Evaluation of the osseous structures demonstrates degenerative changes of   the spine, hips, SI joints. Patchy lucencies throughout the bones which   likely represent patchyosteoporosis.    IMPRESSION:  Colonic diverticulosis. Small hiatal hernia. Gastric wall thickening   versus underdistention.    Status post cholecystectomy. 1.2 cm focal area of high density in the   duodenum in the region of the major duodenal papilla which could   represent undigested food or medications, however MRCP would be helpful   to exclude ampullary stones.    Absent right kidney, correlation with surgical history is recommended.

## 2017-08-28 NOTE — PROGRESS NOTE ADULT - PROBLEM SELECTOR PLAN 8
F: Po intake has increased, no longer needs fluids at this time  E: Replete electrolytes PRN goal K>2 Mg>4  N: Regular Diet

## 2017-08-29 VITALS
OXYGEN SATURATION: 95 % | DIASTOLIC BLOOD PRESSURE: 77 MMHG | SYSTOLIC BLOOD PRESSURE: 121 MMHG | TEMPERATURE: 97 F | RESPIRATION RATE: 17 BRPM | HEART RATE: 64 BPM

## 2017-08-29 DIAGNOSIS — R93.3 ABNORMAL FINDINGS ON DIAGNOSTIC IMAGING OF OTHER PARTS OF DIGESTIVE TRACT: ICD-10-CM

## 2017-08-29 LAB
ANION GAP SERPL CALC-SCNC: 12 MMOL/L — SIGNIFICANT CHANGE UP (ref 5–17)
BUN SERPL-MCNC: 8 MG/DL — SIGNIFICANT CHANGE UP (ref 7–23)
CALCIUM SERPL-MCNC: 9.2 MG/DL — SIGNIFICANT CHANGE UP (ref 8.4–10.5)
CHLORIDE SERPL-SCNC: 98 MMOL/L — SIGNIFICANT CHANGE UP (ref 96–108)
CO2 SERPL-SCNC: 21 MMOL/L — LOW (ref 22–31)
CREAT SERPL-MCNC: 1.4 MG/DL — HIGH (ref 0.5–1.3)
GLUCOSE SERPL-MCNC: 110 MG/DL — HIGH (ref 70–99)
HCT VFR BLD CALC: 36.1 % — LOW (ref 39–50)
HGB BLD-MCNC: 12.2 G/DL — LOW (ref 13–17)
MAGNESIUM SERPL-MCNC: 1.8 MG/DL — SIGNIFICANT CHANGE UP (ref 1.6–2.6)
MCHC RBC-ENTMCNC: 30.3 PG — SIGNIFICANT CHANGE UP (ref 27–34)
MCHC RBC-ENTMCNC: 33.8 G/DL — SIGNIFICANT CHANGE UP (ref 32–36)
MCV RBC AUTO: 89.8 FL — SIGNIFICANT CHANGE UP (ref 80–100)
PLATELET # BLD AUTO: 280 K/UL — SIGNIFICANT CHANGE UP (ref 150–400)
POTASSIUM SERPL-MCNC: 4.3 MMOL/L — SIGNIFICANT CHANGE UP (ref 3.5–5.3)
POTASSIUM SERPL-SCNC: 4.3 MMOL/L — SIGNIFICANT CHANGE UP (ref 3.5–5.3)
RBC # BLD: 4.02 M/UL — LOW (ref 4.2–5.8)
RBC # FLD: 11.9 % — SIGNIFICANT CHANGE UP (ref 10.3–16.9)
SODIUM SERPL-SCNC: 131 MMOL/L — LOW (ref 135–145)
WBC # BLD: 5.7 K/UL — SIGNIFICANT CHANGE UP (ref 3.8–10.5)
WBC # FLD AUTO: 5.7 K/UL — SIGNIFICANT CHANGE UP (ref 3.8–10.5)

## 2017-08-29 PROCEDURE — 82746 ASSAY OF FOLIC ACID SERUM: CPT

## 2017-08-29 PROCEDURE — 74020: CPT

## 2017-08-29 PROCEDURE — 83935 ASSAY OF URINE OSMOLALITY: CPT

## 2017-08-29 PROCEDURE — 82553 CREATINE MB FRACTION: CPT

## 2017-08-29 PROCEDURE — 82570 ASSAY OF URINE CREATININE: CPT

## 2017-08-29 PROCEDURE — 84540 ASSAY OF URINE/UREA-N: CPT

## 2017-08-29 PROCEDURE — 84100 ASSAY OF PHOSPHORUS: CPT

## 2017-08-29 PROCEDURE — 97161 PT EVAL LOW COMPLEX 20 MIN: CPT

## 2017-08-29 PROCEDURE — 36415 COLL VENOUS BLD VENIPUNCTURE: CPT

## 2017-08-29 PROCEDURE — 80053 COMPREHEN METABOLIC PANEL: CPT

## 2017-08-29 PROCEDURE — 83605 ASSAY OF LACTIC ACID: CPT

## 2017-08-29 PROCEDURE — 96375 TX/PRO/DX INJ NEW DRUG ADDON: CPT

## 2017-08-29 PROCEDURE — 82607 VITAMIN B-12: CPT

## 2017-08-29 PROCEDURE — 84484 ASSAY OF TROPONIN QUANT: CPT

## 2017-08-29 PROCEDURE — 83930 ASSAY OF BLOOD OSMOLALITY: CPT

## 2017-08-29 PROCEDURE — 74176 CT ABD & PELVIS W/O CONTRAST: CPT

## 2017-08-29 PROCEDURE — 84443 ASSAY THYROID STIM HORMONE: CPT

## 2017-08-29 PROCEDURE — 70450 CT HEAD/BRAIN W/O DYE: CPT

## 2017-08-29 PROCEDURE — 93005 ELECTROCARDIOGRAM TRACING: CPT

## 2017-08-29 PROCEDURE — 99285 EMERGENCY DEPT VISIT HI MDM: CPT | Mod: 25

## 2017-08-29 PROCEDURE — 84300 ASSAY OF URINE SODIUM: CPT

## 2017-08-29 PROCEDURE — 96374 THER/PROPH/DIAG INJ IV PUSH: CPT

## 2017-08-29 PROCEDURE — 81001 URINALYSIS AUTO W/SCOPE: CPT

## 2017-08-29 PROCEDURE — 82550 ASSAY OF CK (CPK): CPT

## 2017-08-29 PROCEDURE — 83690 ASSAY OF LIPASE: CPT

## 2017-08-29 PROCEDURE — 80048 BASIC METABOLIC PNL TOTAL CA: CPT

## 2017-08-29 PROCEDURE — 71046 X-RAY EXAM CHEST 2 VIEWS: CPT

## 2017-08-29 PROCEDURE — 94640 AIRWAY INHALATION TREATMENT: CPT

## 2017-08-29 PROCEDURE — 87086 URINE CULTURE/COLONY COUNT: CPT

## 2017-08-29 PROCEDURE — 76700 US EXAM ABDOM COMPLETE: CPT

## 2017-08-29 PROCEDURE — 85027 COMPLETE CBC AUTOMATED: CPT

## 2017-08-29 PROCEDURE — 83735 ASSAY OF MAGNESIUM: CPT

## 2017-08-29 PROCEDURE — 85025 COMPLETE CBC W/AUTO DIFF WBC: CPT

## 2017-08-29 RX ORDER — MAGNESIUM SULFATE 500 MG/ML
1 VIAL (ML) INJECTION ONCE
Qty: 0 | Refills: 0 | Status: COMPLETED | OUTPATIENT
Start: 2017-08-29 | End: 2017-08-29

## 2017-08-29 RX ADMIN — LOTEPREDNOL ETABONATE 1 DROP(S): 2 SUSPENSION/ DROPS OPHTHALMIC at 14:40

## 2017-08-29 RX ADMIN — Medication 100 GRAM(S): at 09:13

## 2017-08-29 RX ADMIN — Medication 102 MILLIGRAM(S): at 11:20

## 2017-08-29 RX ADMIN — HEPARIN SODIUM 5000 UNIT(S): 5000 INJECTION INTRAVENOUS; SUBCUTANEOUS at 05:36

## 2017-08-29 RX ADMIN — Medication 1 SPRAY(S): at 11:20

## 2017-08-29 RX ADMIN — PANTOPRAZOLE SODIUM 40 MILLIGRAM(S): 20 TABLET, DELAYED RELEASE ORAL at 05:36

## 2017-08-29 RX ADMIN — Medication 3 MILLIGRAM(S): at 01:16

## 2017-08-29 RX ADMIN — Medication 1 SPRAY(S): at 11:19

## 2017-08-29 RX ADMIN — DORZOLAMIDE HYDROCHLORIDE TIMOLOL MALEATE 1 DROP(S): 20; 5 SOLUTION/ DROPS OPHTHALMIC at 05:35

## 2017-08-29 RX ADMIN — BRIMONIDINE TARTRATE 1 DROP(S): 2 SOLUTION/ DROPS OPHTHALMIC at 05:35

## 2017-08-29 RX ADMIN — LOTEPREDNOL ETABONATE 1 DROP(S): 2 SUSPENSION/ DROPS OPHTHALMIC at 05:35

## 2017-08-29 NOTE — PROGRESS NOTE ADULT - PROBLEM SELECTOR PROBLEM 1
Nausea & vomiting

## 2017-08-29 NOTE — PROGRESS NOTE ADULT - PROBLEM SELECTOR PLAN 2
Currently 131, trend BMP tomorrow, has remained stable  - recheck BMP in am, considering poor po intake   -- continue to encourage PO intake

## 2017-08-29 NOTE — PROGRESS NOTE ADULT - PROBLEM SELECTOR PLAN 7
s/p cataract surgery Aug 10.   - c/w home Latanoprost 0.005% solution and other home eye drops, pharmacy approved  - since his eyes still seem blurry, ophthalmology has been consulted, recommended eye drops for both eyes, however no acute issue right now.

## 2017-08-29 NOTE — PROGRESS NOTE ADULT - SUBJECTIVE AND OBJECTIVE BOX
INTERVAL HPI/OVERNIGHT EVENTS:  Patient was seen and examined at bedside. As per nurse and patient, no o/n events, patient resting comfortably. He states that his vision is much better now that he is getting them properly. He notes that he woke up with a headache today, however his nausea is better. He states that he always feels worse after he eats, and now it seems like he has mucous sitting in the back of his throat. Patient denies: fever, chills, dizziness, weakness, HA, Changes in vision, CP, palpitations, SOB, cough, V/D/C, dysuria, changes in bowel movements, LE edema.    VITAL SIGNS:  T(F): 97.3 (08-29-17 @ 08:40)  HR: 64 (08-29-17 @ 08:40)  BP: 121/77 (08-29-17 @ 08:40)  RR: 17 (08-29-17 @ 08:40)  SpO2: 95% (08-29-17 @ 08:40)  Wt(kg): --    PHYSICAL EXAM:    Constitutional: NAD  Eyes: PERRL, EOMI, sclera non-icteric  Neck: supple, trachea midline, no masses, no JVD  HEENT: MMM  Respiratory: CTA b/l, good air entry b/l, no wheezing, no rhonchi, no rales, without accessory muscle use and no intercostal retractions  Cardiovascular: RRR, normal S1S2, no M/R/G  Gastrointestinal: soft, NTND, no masses palpable, BS normal  Extremities: Warm, well perfused, pulses equal bilateral upper and lower extremities, no edema, no clubbing  Neurological: AAOx3, CN Grossly intact  Skin: Normal temperature, warm, dry    MEDICATIONS  (STANDING):  simvastatin 40 milliGRAM(s) Oral at bedtime  latanoprost 0.005% Ophthalmic Solution 1 Drop(s) Both EYES at bedtime  pantoprazole    Tablet 40 milliGRAM(s) Oral before breakfast  heparin  Injectable 5000 Unit(s) SubCutaneous every 8 hours  sodium chloride 0.65% Nasal 1 Spray(s) Both Nostrils daily  fluticasone propionate 50 MICROgram(s)/spray Nasal Spray 1 Spray(s) Both Nostrils daily  thiamine IVPB 100 milliGRAM(s) IV Intermittent daily  dorzolamide 2%/timolol 0.5% Ophthalmic Solution 1 Drop(s) Both EYES two times a day  brimonidine 0.2% Ophthalmic Solution 1 Drop(s) Both EYES two times a day  loteprednol 0.5% Ophthalmic Suspension 1 Drop(s) Left EYE three times a day    MEDICATIONS  (PRN):  aluminum hydroxide/magnesium hydroxide/simethicone Suspension 30 milliLiter(s) Oral every 4 hours PRN Dyspepsia  acetaminophen   Tablet 650 milliGRAM(s) Oral every 6 hours PRN moderate pain 4-6      Allergies    No Known Allergies    Intolerances        LABS:                        12.2   5.7   )-----------( 280      ( 29 Aug 2017 07:16 )             36.1     08-29    131<L>  |  98  |  8   ----------------------------<  110<H>  4.3   |  21<L>  |  1.40<H>    Ca    9.2      29 Aug 2017 07:16  Mg     1.8     08-29            RADIOLOGY & ADDITIONAL TESTS:

## 2017-08-29 NOTE — PROGRESS NOTE ADULT - PROBLEM SELECTOR PLAN 1
Pt c/o worsening nausea x2 years, with dry-heaving o/n. Pt  burp/dry heave on palpation epigastric region. No likely infectious in origin. CT Abd demonstrates evidence of hiatal hernia with gastric thickening, US was benign with no significant finding  -CT showed possible stone in duodenal region (pt s/p cholecystectomy), possible MRCP.  -f/u GI recs- recommend possible MRCP or EGD

## 2017-08-29 NOTE — PROGRESS NOTE ADULT - SUBJECTIVE AND OBJECTIVE BOX
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 Per patient, he has had Nausea and dry heaving for the last 2 years but overnight, his sx worsened and he dry heaved "hundreds of times." Patient describes the discomft as "gas like" in the epigastric region with associated belching with minor relief. Pt cant identify exacerbating/alleviating sx. Patient's last BM was while in the ED. Patient endorsed decrease PO intake over the last couple of days 2/2 to worsening nausea; currently, he feels very thirsty. THE PAIN WAS SO BAD THAT HE THOUGHT ABOUT SUICIDE. LIKE A KNIFE Pt denies Abd or back pain. Patient w/o any other complaints. Of note, patient have recent cataract surgery (Aug 10) and tolerated the procedure well, has been compliant with prescribed drops.   Pt denies chest pain, palpitations, lightheadedness SOB, HA, numbness tingling, recent weight loss/gain, dysuria, fever/chills.     In the ED, VS T 37 137/76  RR18 (96% RA). Labs significant for hyponatremia (127). Patient given 1L NS Bolus and started on NS @125cc/Hr. S/p Zofran 4mg IVP and famotidine 20mg IVP (25 Aug 2017 15:46)      Interval History - no change in neuro status- no new weakness        REVIEW OF SYSTEMS    Vital Signs Last 24 Hrs  T(C): 36.3 (29 Aug 2017 08:40), Max: 37.1 (29 Aug 2017 05:05)  T(F): 97.3 (29 Aug 2017 08:40), Max: 98.7 (29 Aug 2017 05:05)  HR: 64 (29 Aug 2017 08:40) (61 - 64)  BP: 121/77 (29 Aug 2017 08:40) (107/64 - 156/68)  BP(mean): --  RR: 17 (29 Aug 2017 08:40) (16 - 18)  SpO2: 95% (29 Aug 2017 08:40) (93% - 98%)    Physical Exam-     Mental Status- awake - poor memory    Cranial Nerves- full EOM    Gait and station-    Motor- moves all 4 extremities    Reflexes- decreased    Sensation- no sensory level    Coordination- no tremors    Vascular -    Medications  simvastatin 40 milliGRAM(s) Oral at bedtime  latanoprost 0.005% Ophthalmic Solution 1 Drop(s) Both EYES at bedtime  aluminum hydroxide/magnesium hydroxide/simethicone Suspension 30 milliLiter(s) Oral every 4 hours PRN  pantoprazole    Tablet 40 milliGRAM(s) Oral before breakfast  heparin  Injectable 5000 Unit(s) SubCutaneous every 8 hours  acetaminophen   Tablet 650 milliGRAM(s) Oral every 6 hours PRN  sodium chloride 0.65% Nasal 1 Spray(s) Both Nostrils daily  fluticasone propionate 50 MICROgram(s)/spray Nasal Spray 1 Spray(s) Both Nostrils daily  thiamine IVPB 100 milliGRAM(s) IV Intermittent daily  dorzolamide 2%/timolol 0.5% Ophthalmic Solution 1 Drop(s) Left EYE two times a day  brimonidine 0.2% Ophthalmic Solution 1 Drop(s) Left EYE two times a day  loteprednol 0.5% Ophthalmic Suspension 1 Drop(s) Left EYE three times a day      Lab      Radiology    Assessment- [facility]    Plan- full W/O as OP

## 2017-08-29 NOTE — CONSULT NOTE ADULT - SUBJECTIVE AND OBJECTIVE BOX
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 ED with 2 years of N/V. Per patient, he has had Nausea and dry heaving for the last 2 years but overnight, his sx worsened and he dry heaved "hundreds of times." Patient describes the discomft as "gas like" in the epigastric region with associated belching with minor relief. Pt cant identify exacerbating/alleviating sx. Has decreased PO intake over the last couple of days 2/2 to worsening nausea; currently, he feels very thirsty. Pt denies Abd or back pain. Patient w/o any other complaints. Of note, patient have recent cataract surgery (Aug 10) and tolerated the procedure well, has been compliant with prescribed drops.   Pt denies chest pain, palpitations, lightheadedness SOB, HA, numbness tingling, recent weight loss/gain, dysuria, fever/chills.         REVIEW OF SYSTEMS:  Constitutional: No fever, weight loss or fatigue  ENMT:  No difficulty hearing, tinnitus, vertigo; No sinus or throat pain  Respiratory: No cough, wheezing, chills or hemoptysis  Cardiovascular: No chest pain, palpitations, dizziness or leg swelling  Gastrointestinal: No abdominal or epigastric pain. + nausea & vomiting no hematemesis; No diarrhea or constipation. No melena or hematochezia.  Skin: No itching, burning, rashes or lesions   Musculoskeletal: No joint pain or swelling; No muscle, back or extremity pain    PAST MEDICAL & SURGICAL HISTORY:  GERD (gastroesophageal reflux disease)  Other hyperlipidemia  Other glaucoma of both eyes  History of cholecystectomy      FAMILY HISTORY:  No pertinent family history in first degree relatives      SOCIAL HISTORY:  Smoking Status: [ ] Current, [ ] Former, [ ] Never  Pack Years:    MEDICATIONS:  MEDICATIONS  (STANDING):  simvastatin 40 milliGRAM(s) Oral at bedtime  latanoprost 0.005% Ophthalmic Solution 1 Drop(s) Both EYES at bedtime  pantoprazole    Tablet 40 milliGRAM(s) Oral before breakfast  heparin  Injectable 5000 Unit(s) SubCutaneous every 8 hours  sodium chloride 0.65% Nasal 1 Spray(s) Both Nostrils daily  fluticasone propionate 50 MICROgram(s)/spray Nasal Spray 1 Spray(s) Both Nostrils daily  thiamine IVPB 100 milliGRAM(s) IV Intermittent daily  dorzolamide 2%/timolol 0.5% Ophthalmic Solution 1 Drop(s) Left EYE two times a day  brimonidine 0.2% Ophthalmic Solution 1 Drop(s) Left EYE two times a day  loteprednol 0.5% Ophthalmic Suspension 1 Drop(s) Left EYE three times a day    MEDICATIONS  (PRN):  aluminum hydroxide/magnesium hydroxide/simethicone Suspension 30 milliLiter(s) Oral every 4 hours PRN Dyspepsia  acetaminophen   Tablet 650 milliGRAM(s) Oral every 6 hours PRN moderate pain 4-6      Allergies    No Known Allergies    Intolerances        Vital Signs Last 24 Hrs  T(C): 36.3 (29 Aug 2017 08:40), Max: 37.1 (29 Aug 2017 05:05)  T(F): 97.3 (29 Aug 2017 08:40), Max: 98.7 (29 Aug 2017 05:05)  HR: 64 (29 Aug 2017 08:40) (61 - 64)  BP: 121/77 (29 Aug 2017 08:40) (107/64 - 156/68)  BP(mean): --  RR: 17 (29 Aug 2017 08:40) (16 - 18)  SpO2: 95% (29 Aug 2017 08:40) (93% - 98%)        PHYSICAL EXAM:    General:  in no acute distress  HEENT: MMM, conjunctiva and sclera clear  Gastrointestinal: Soft, non-tender non-distended; Normal bowel sounds; No rebound or guarding  Extremities: Normal range of motion, No clubbing, cyanosis or edema  Neurological: Alert and oriented x3  Skin: Warm and dry. No obvious rash      LABS:                        12.2   5.7   )-----------( 280      ( 29 Aug 2017 07:16 )             36.1     08-29    131<L>  |  98  |  8   ----------------------------<  110<H>  4.3   |  21<L>  |  1.40<H>    Ca    9.2      29 Aug 2017 07:16  Mg     1.8     08-29            RADIOLOGY & ADDITIONAL STUDIES:   < from: CT Abdomen and Pelvis No Cont (08.25.17 @ 13:29) >  CT ABDOMEN AND PELVIS        Underdistention   versus gastric wall thickening.    < from: CT Abdomen and Pelvis No Cont (08.25.17 @ 13:29) >  Status post cholecystectomy. 1.2 cm focal area of high density in the   duodenum in the region of the major duodenal papilla which could   represent undigested food or medications, however MRCP would be helpful   to exclude ampullary stones.    < end of copied text >
Patient alert, oriented, cooperative  Vision:  Easy count fingers across room without correction OU.  Vision sharper OS than OD    Extraocular muscles:  Full OU    Pupils:  Round, reactive to light OU with NO afferent pupillary defect    Lids:  Good position OU    Conjunctiva:  Quiet OD  Trace injection OS    Corneas:  Clear OU without edema OS    Anterior chambers:  Deep, formed OU    Lens:  Posterior chamber intraocular lens well positioned OS    Fundi:  Poor view details.  Positive red reflex OU    Intraocular pressures:  Tonopen:  OD 29  OS 31  6:45 PM  (patient notes most recent IOP was 19 OD, 20 OS)
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 Per patient, he has had Nausea and dry heaving for the last 2 years but overnight, his sx worsened and he dry heaved "hundreds of times." Patient describes the discomft as "gas like" in the epigastric region with associated belching with minor relief. Pt cant identify exacerbating/alleviating sx. Patient's last BM was while in the ED. Patient endorsed decrease PO intake over the last couple of days 2/2 to worsening nausea; currently, he feels very thirsty. THE PAIN WAS SO BAD THAT HE THOUGHT ABOUT SUICIDE. LIKE A KNIFE Pt denies Abd or back pain. Patient w/o any other complaints. Of note, patient have recent cataract surgery (Aug 10) and tolerated the procedure well, has been compliant with prescribed drops.   Pt denies chest pain, palpitations, lightheadedness SOB, HA, numbness tingling, recent weight loss/gain, dysuria, fever/chills.     In the ED, VS T 37 137/76  RR18 (96% RA). Labs significant for hyponatremia (127). Patient given 1L NS Bolus and started on NS @125cc/Hr. S/p Zofran 4mg IVP and famotidine 20mg IVP (25 Aug 2017 15:46)    Pt noted to be confused- no headaches or dizziness      Allergies  No Known Allergies      Health Issues  ABDOMINAL PAIN  No h/o HF  No pertinent family history in first degree relatives  Handoff  MEWS Score  GERD (gastroesophageal reflux disease)  Other hyperlipidemia  Benign prostatic hyperplasia, presence of lower urinary tract symptoms unspecified, unspecified morphology  Other glaucoma of both eyes  Abdominal pain  Cough  Confusion  Conjunctivitis  Postnasal discharge  Dehydration  Abdominal pain  LORENZO (acute kidney injury)  Prophylactic measure  Nutrition, metabolism, and development symptoms  Other glaucoma of both eyes  Other hyperlipidemia  GERD (gastroesophageal reflux disease)  Hyponatremia  Nausea & vomiting  History of cholecystectomy  ABDOMINAL PAIN  90+  Dehydration  Hyponatremia  Nausea & vomiting        FAMILY HISTORY:  No pertinent family history in first degree relatives      MEDICATIONS  (STANDING):  simvastatin 40 milliGRAM(s) Oral at bedtime  latanoprost 0.005% Ophthalmic Solution 1 Drop(s) Both EYES at bedtime  pantoprazole    Tablet 40 milliGRAM(s) Oral before breakfast  heparin  Injectable 5000 Unit(s) SubCutaneous every 8 hours  sodium chloride 0.45%. 1000 milliLiter(s) (100 mL/Hr) IV Continuous <Continuous>  sodium chloride 0.65% Nasal 1 Spray(s) Both Nostrils daily  fluticasone propionate 50 MICROgram(s)/spray Nasal Spray 1 Spray(s) Both Nostrils daily  thiamine IVPB 100 milliGRAM(s) IV Intermittent daily    MEDICATIONS  (PRN):  aluminum hydroxide/magnesium hydroxide/simethicone Suspension 30 milliLiter(s) Oral every 4 hours PRN Dyspepsia  acetaminophen   Tablet 650 milliGRAM(s) Oral every 6 hours PRN moderate pain 4-6      PAST MEDICAL & SURGICAL HISTORY:  GERD (gastroesophageal reflux disease)  Other hyperlipidemia  Other glaucoma of both eyes  History of cholecystectomy      Labs                          12.6   6.1   )-----------( 273      ( 26 Aug 2017 05:49 )             37.7     08-26    134<L>  |  99  |  16  ----------------------------<  104<H>  4.7   |  23  |  1.60<H>    Ca    8.8      26 Aug 2017 05:49  Phos  3.9     08-26  Mg     2.1     08-26    TPro  8.0  /  Alb  4.1  /  TBili  0.7  /  DBili  x   /  AST  24  /  ALT  17  /  AlkPhos  139<H>  08-25      Radiology:    Physical Exam    MENTAL STATUS  -Level of Consciousness- awake    Orientation- person, place time  Language- aphasia/ dysarthria  Memory- recent and remote- poor      Cranial Nerve 1- 12  Pupils- equal and reactive  Eye movements-  Facial - no asymmetry   Lower CN-nl    Gait and Station- no foot drop    MOTOR  Upper-nl  Lower- no focal weakness    Reflexes- decreased    Sensation- distal sensory loss    Cerebellar- mild tremors    vascular -no bruits    Assessment- R/O MCI    Plan  Mini mental status, CT head, B12, thiamine, TSH, EEG

## 2017-08-29 NOTE — PROGRESS NOTE ADULT - ASSESSMENT
82y M with PMHx of GERD, recent cataract surgery and h/o cholecystectomy (2016) presented to [facility] ED dry heaving overnight, worsening of his baseline nausea x 2years. Admitted for chronic nausea with poor PO intake.

## 2017-08-31 ENCOUNTER — INPATIENT (INPATIENT)
Facility: HOSPITAL | Age: 82
LOS: 3 days | Discharge: LEFT BEFORE DISCHARGE | DRG: 392 | End: 2017-09-04
Attending: FAMILY MEDICINE | Admitting: FAMILY MEDICINE
Payer: MEDICARE

## 2017-08-31 VITALS
DIASTOLIC BLOOD PRESSURE: 88 MMHG | HEART RATE: 80 BPM | SYSTOLIC BLOOD PRESSURE: 132 MMHG | WEIGHT: 175.05 LBS | TEMPERATURE: 98 F | OXYGEN SATURATION: 98 % | RESPIRATION RATE: 20 BRPM

## 2017-08-31 DIAGNOSIS — E87.1 HYPO-OSMOLALITY AND HYPONATREMIA: ICD-10-CM

## 2017-08-31 DIAGNOSIS — R11.2 NAUSEA WITH VOMITING, UNSPECIFIED: ICD-10-CM

## 2017-08-31 DIAGNOSIS — N17.9 ACUTE KIDNEY FAILURE, UNSPECIFIED: ICD-10-CM

## 2017-08-31 DIAGNOSIS — H10.9 UNSPECIFIED CONJUNCTIVITIS: ICD-10-CM

## 2017-08-31 DIAGNOSIS — E86.1 HYPOVOLEMIA: ICD-10-CM

## 2017-08-31 DIAGNOSIS — K44.9 DIAPHRAGMATIC HERNIA WITHOUT OBSTRUCTION OR GANGRENE: ICD-10-CM

## 2017-08-31 DIAGNOSIS — R07.9 CHEST PAIN, UNSPECIFIED: ICD-10-CM

## 2017-08-31 DIAGNOSIS — N40.0 BENIGN PROSTATIC HYPERPLASIA WITHOUT LOWER URINARY TRACT SYMPTOMS: ICD-10-CM

## 2017-08-31 DIAGNOSIS — H40.10X0 UNSPECIFIED OPEN-ANGLE GLAUCOMA, STAGE UNSPECIFIED: ICD-10-CM

## 2017-08-31 DIAGNOSIS — E78.5 HYPERLIPIDEMIA, UNSPECIFIED: ICD-10-CM

## 2017-08-31 DIAGNOSIS — E86.0 DEHYDRATION: ICD-10-CM

## 2017-08-31 DIAGNOSIS — Z90.5 ACQUIRED ABSENCE OF KIDNEY: ICD-10-CM

## 2017-08-31 DIAGNOSIS — Z90.49 ACQUIRED ABSENCE OF OTHER SPECIFIED PARTS OF DIGESTIVE TRACT: Chronic | ICD-10-CM

## 2017-08-31 DIAGNOSIS — R09.82 POSTNASAL DRIP: ICD-10-CM

## 2017-08-31 DIAGNOSIS — R10.9 UNSPECIFIED ABDOMINAL PAIN: ICD-10-CM

## 2017-08-31 DIAGNOSIS — K21.9 GASTRO-ESOPHAGEAL REFLUX DISEASE WITHOUT ESOPHAGITIS: ICD-10-CM

## 2017-08-31 LAB
ALBUMIN SERPL ELPH-MCNC: 2.9 G/DL — LOW (ref 3.5–5)
ALP SERPL-CCNC: 229 U/L — HIGH (ref 40–120)
ALT FLD-CCNC: 75 U/L DA — HIGH (ref 10–60)
ANION GAP SERPL CALC-SCNC: 9 MMOL/L — SIGNIFICANT CHANGE UP (ref 5–17)
AST SERPL-CCNC: 82 U/L — HIGH (ref 10–40)
BILIRUB SERPL-MCNC: 0.6 MG/DL — SIGNIFICANT CHANGE UP (ref 0.2–1.2)
BUN SERPL-MCNC: 15 MG/DL — SIGNIFICANT CHANGE UP (ref 7–18)
CALCIUM SERPL-MCNC: 8.9 MG/DL — SIGNIFICANT CHANGE UP (ref 8.4–10.5)
CHLORIDE SERPL-SCNC: 105 MMOL/L — SIGNIFICANT CHANGE UP (ref 96–108)
CO2 SERPL-SCNC: 24 MMOL/L — SIGNIFICANT CHANGE UP (ref 22–31)
CREAT SERPL-MCNC: 1.51 MG/DL — HIGH (ref 0.5–1.3)
EOSINOPHIL NFR BLD AUTO: 2 % — SIGNIFICANT CHANGE UP (ref 0–6)
GLUCOSE SERPL-MCNC: 102 MG/DL — HIGH (ref 70–99)
HCT VFR BLD CALC: 39.3 % — SIGNIFICANT CHANGE UP (ref 39–50)
HGB BLD-MCNC: 12.8 G/DL — LOW (ref 13–17)
LACTATE SERPL-SCNC: 0.8 MMOL/L — SIGNIFICANT CHANGE UP (ref 0.7–2)
LIDOCAIN IGE QN: 120 U/L — SIGNIFICANT CHANGE UP (ref 73–393)
LYMPHOCYTES # BLD AUTO: 9 % — LOW (ref 13–44)
MCHC RBC-ENTMCNC: 29.9 PG — SIGNIFICANT CHANGE UP (ref 27–34)
MCHC RBC-ENTMCNC: 32.5 GM/DL — SIGNIFICANT CHANGE UP (ref 32–36)
MCV RBC AUTO: 91.8 FL — SIGNIFICANT CHANGE UP (ref 80–100)
MONOCYTES NFR BLD AUTO: 19 % — HIGH (ref 2–14)
NEUTROPHILS NFR BLD AUTO: 69 % — SIGNIFICANT CHANGE UP (ref 43–77)
PLATELET # BLD AUTO: 272 K/UL — SIGNIFICANT CHANGE UP (ref 150–400)
POTASSIUM SERPL-MCNC: 4.1 MMOL/L — SIGNIFICANT CHANGE UP (ref 3.5–5.3)
POTASSIUM SERPL-SCNC: 4.1 MMOL/L — SIGNIFICANT CHANGE UP (ref 3.5–5.3)
PROT SERPL-MCNC: 6.4 G/DL — SIGNIFICANT CHANGE UP (ref 6–8.3)
RBC # BLD: 4.28 M/UL — SIGNIFICANT CHANGE UP (ref 4.2–5.8)
RBC # FLD: 11.7 % — SIGNIFICANT CHANGE UP (ref 10.3–14.5)
SODIUM SERPL-SCNC: 138 MMOL/L — SIGNIFICANT CHANGE UP (ref 135–145)
TROPONIN I SERPL-MCNC: <0.015 NG/ML — SIGNIFICANT CHANGE UP (ref 0–0.04)
WBC # BLD: 6.6 K/UL — SIGNIFICANT CHANGE UP (ref 3.8–10.5)
WBC # FLD AUTO: 6.6 K/UL — SIGNIFICANT CHANGE UP (ref 3.8–10.5)

## 2017-08-31 PROCEDURE — 74177 CT ABD & PELVIS W/CONTRAST: CPT | Mod: 26

## 2017-08-31 PROCEDURE — 71020: CPT | Mod: 26

## 2017-08-31 PROCEDURE — 99285 EMERGENCY DEPT VISIT HI MDM: CPT

## 2017-08-31 RX ORDER — SODIUM CHLORIDE 9 MG/ML
1000 INJECTION INTRAMUSCULAR; INTRAVENOUS; SUBCUTANEOUS ONCE
Qty: 0 | Refills: 0 | Status: COMPLETED | OUTPATIENT
Start: 2017-08-31 | End: 2017-08-31

## 2017-08-31 RX ORDER — ONDANSETRON 8 MG/1
4 TABLET, FILM COATED ORAL ONCE
Qty: 0 | Refills: 0 | Status: COMPLETED | OUTPATIENT
Start: 2017-08-31 | End: 2017-08-31

## 2017-08-31 RX ORDER — SODIUM CHLORIDE 9 MG/ML
1000 INJECTION INTRAMUSCULAR; INTRAVENOUS; SUBCUTANEOUS
Qty: 0 | Refills: 0 | Status: COMPLETED | OUTPATIENT
Start: 2017-08-31 | End: 2017-09-01

## 2017-08-31 RX ORDER — FAMOTIDINE 10 MG/ML
20 INJECTION INTRAVENOUS ONCE
Qty: 0 | Refills: 0 | Status: COMPLETED | OUTPATIENT
Start: 2017-08-31 | End: 2017-08-31

## 2017-08-31 RX ORDER — SODIUM CHLORIDE 9 MG/ML
1000 INJECTION INTRAMUSCULAR; INTRAVENOUS; SUBCUTANEOUS
Qty: 0 | Refills: 0 | Status: DISCONTINUED | OUTPATIENT
Start: 2017-08-31 | End: 2017-09-01

## 2017-08-31 RX ORDER — PANTOPRAZOLE SODIUM 20 MG/1
40 TABLET, DELAYED RELEASE ORAL ONCE
Qty: 0 | Refills: 0 | Status: COMPLETED | OUTPATIENT
Start: 2017-08-31 | End: 2017-08-31

## 2017-08-31 RX ADMIN — SODIUM CHLORIDE 2000 MILLILITER(S): 9 INJECTION INTRAMUSCULAR; INTRAVENOUS; SUBCUTANEOUS at 13:10

## 2017-08-31 RX ADMIN — FAMOTIDINE 20 MILLIGRAM(S): 10 INJECTION INTRAVENOUS at 13:10

## 2017-08-31 RX ADMIN — ONDANSETRON 4 MILLIGRAM(S): 8 TABLET, FILM COATED ORAL at 13:09

## 2017-08-31 RX ADMIN — SODIUM CHLORIDE 150 MILLILITER(S): 9 INJECTION INTRAMUSCULAR; INTRAVENOUS; SUBCUTANEOUS at 21:29

## 2017-08-31 NOTE — ED PROVIDER NOTE - OBJECTIVE STATEMENT
Jordin Alarcon MD (resident): recently discharged for similar complaint of epigastric pain and nausea with retching but no vomiting, and constipation, last BM was 4 days ago. Reports pressure of the anterior chest, no SOB. PMHx of GERD, recent cataract surgery and h/o cholecystectomy. During recent admission Tx w/ zofran, famotidine, thiamine and B12. CT scan showed gastric wall thickening and ultrasound was benign with no signs of stones. The patient had episodes of dry heaving that decreased substantially over his stay. GI doctor  wanted to do a follow up EGD and MRCP outpatient      PMD: Dr. Leiva; Dr. Bucio (GI).

## 2017-08-31 NOTE — ED PROVIDER NOTE - MEDICAL DECISION MAKING DETAILS
Jordin Alarcon MD (resident): 82 M w/ Hx of cholecystectomy and GERD, taking statins, who p/w epigastric and anterior chest pressure that radiates to the throat, a/w nausea, retching and constipation. Will get EKG, CXR and cardiac enzymes for ACS work-up. Will get labs w/ lipase and lactate and a CT scan to eval for abd pain, and to r/o SBO. Will give symptomatic control

## 2017-08-31 NOTE — ED PROVIDER NOTE - PHYSICAL EXAMINATION
Physical Exam: elderly M who is in mild distress, AAOx3, NCAT, MMM, neck is supple, PERRL, CTAB, normal rate and regular rhythm, abdomen is soft and + TTP to the epigastrium without rebound, no CVA tendenress, No edema, No deformity of extremities, No rashes, CN grossly intact, No focal motor or sensory deficits. ~ Jordin Alarcon MD

## 2017-08-31 NOTE — ED PROVIDER NOTE - NS ED ROS FT
No fever, no chills, no change in vision, no change in hearing, + anterior chest tightness, no shortness of breath, + abdominal pain, no vomiting, no dysuria, no muscle pain, no rashes, no loss of consciousness. ~ Jordin Alarcon MD

## 2017-08-31 NOTE — ED PROVIDER NOTE - ATTENDING CONTRIBUTION TO CARE
Agree with resident H&P.  81 y/o male presents with constipation and chest/epigastric discomfort.  +mild tenderness on exam.  Will check labs, cardiac enzymes, CT abd/pelvis and reassess for likely admission.

## 2017-09-01 DIAGNOSIS — Z98.49 CATARACT EXTRACTION STATUS, UNSPECIFIED EYE: Chronic | ICD-10-CM

## 2017-09-01 DIAGNOSIS — E78.5 HYPERLIPIDEMIA, UNSPECIFIED: ICD-10-CM

## 2017-09-01 DIAGNOSIS — Z90.49 ACQUIRED ABSENCE OF OTHER SPECIFIED PARTS OF DIGESTIVE TRACT: Chronic | ICD-10-CM

## 2017-09-01 DIAGNOSIS — K21.9 GASTRO-ESOPHAGEAL REFLUX DISEASE WITHOUT ESOPHAGITIS: ICD-10-CM

## 2017-09-01 DIAGNOSIS — Z29.9 ENCOUNTER FOR PROPHYLACTIC MEASURES, UNSPECIFIED: ICD-10-CM

## 2017-09-01 LAB
APPEARANCE UR: CLEAR — SIGNIFICANT CHANGE UP
BILIRUB UR-MCNC: NEGATIVE — SIGNIFICANT CHANGE UP
CHOLEST SERPL-MCNC: 103 MG/DL — SIGNIFICANT CHANGE UP (ref 10–199)
CK MB BLD-MCNC: 2.6 % — SIGNIFICANT CHANGE UP (ref 0–3.5)
CK MB BLD-MCNC: 3.9 % — HIGH (ref 0–3.5)
CK MB CFR SERPL CALC: 1.2 NG/ML — SIGNIFICANT CHANGE UP (ref 0–3.6)
CK MB CFR SERPL CALC: 1.6 NG/ML — SIGNIFICANT CHANGE UP (ref 0–3.6)
CK SERPL-CCNC: 41 U/L — SIGNIFICANT CHANGE UP (ref 35–232)
CK SERPL-CCNC: 46 U/L — SIGNIFICANT CHANGE UP (ref 35–232)
COLOR SPEC: YELLOW — SIGNIFICANT CHANGE UP
DIFF PNL FLD: NEGATIVE — SIGNIFICANT CHANGE UP
GGT SERPL-CCNC: 234 U/L — HIGH (ref 9–50)
GLUCOSE UR QL: NEGATIVE — SIGNIFICANT CHANGE UP
HAV IGM SER-ACNC: SIGNIFICANT CHANGE UP
HBV CORE IGM SER-ACNC: SIGNIFICANT CHANGE UP
HBV SURFACE AG SER-ACNC: SIGNIFICANT CHANGE UP
HCV AB S/CO SERPL IA: 0.07 S/CO — SIGNIFICANT CHANGE UP
HCV AB SERPL-IMP: SIGNIFICANT CHANGE UP
HDLC SERPL-MCNC: 53 MG/DL — SIGNIFICANT CHANGE UP (ref 40–125)
KETONES UR-MCNC: NEGATIVE — SIGNIFICANT CHANGE UP
LEUKOCYTE ESTERASE UR-ACNC: NEGATIVE — SIGNIFICANT CHANGE UP
LIDOCAIN IGE QN: 115 U/L — SIGNIFICANT CHANGE UP (ref 73–393)
LIPID PNL WITH DIRECT LDL SERPL: 38 MG/DL — SIGNIFICANT CHANGE UP
NITRITE UR-MCNC: NEGATIVE — SIGNIFICANT CHANGE UP
PH UR: 7 — SIGNIFICANT CHANGE UP (ref 5–8)
PROT UR-MCNC: NEGATIVE — SIGNIFICANT CHANGE UP
SP GR SPEC: 1 — LOW (ref 1.01–1.02)
TOTAL CHOLESTEROL/HDL RATIO MEASUREMENT: 1.9 RATIO — LOW (ref 3.4–9.6)
TRIGL SERPL-MCNC: 58 MG/DL — SIGNIFICANT CHANGE UP (ref 10–149)
TROPONIN I SERPL-MCNC: 0.02 NG/ML — SIGNIFICANT CHANGE UP (ref 0–0.04)
TROPONIN I SERPL-MCNC: 0.02 NG/ML — SIGNIFICANT CHANGE UP (ref 0–0.04)
UROBILINOGEN FLD QL: NEGATIVE — SIGNIFICANT CHANGE UP

## 2017-09-01 RX ORDER — ENOXAPARIN SODIUM 100 MG/ML
30 INJECTION SUBCUTANEOUS DAILY
Qty: 0 | Refills: 0 | Status: DISCONTINUED | OUTPATIENT
Start: 2017-09-01 | End: 2017-09-04

## 2017-09-01 RX ORDER — LATANOPROST 0.05 MG/ML
1 SOLUTION/ DROPS OPHTHALMIC; TOPICAL AT BEDTIME
Qty: 0 | Refills: 0 | Status: DISCONTINUED | OUTPATIENT
Start: 2017-09-01 | End: 2017-09-04

## 2017-09-01 RX ORDER — ERYTHROMYCIN BASE 5 MG/GRAM
1 OINTMENT (GRAM) OPHTHALMIC (EYE)
Qty: 0 | Refills: 0 | Status: DISCONTINUED | OUTPATIENT
Start: 2017-09-01 | End: 2017-09-04

## 2017-09-01 RX ORDER — ASPIRIN/CALCIUM CARB/MAGNESIUM 324 MG
81 TABLET ORAL DAILY
Qty: 0 | Refills: 0 | Status: DISCONTINUED | OUTPATIENT
Start: 2017-09-01 | End: 2017-09-04

## 2017-09-01 RX ORDER — KETOROLAC TROMETHAMINE 0.5 %
1 DROPS OPHTHALMIC (EYE) DAILY
Qty: 0 | Refills: 0 | Status: DISCONTINUED | OUTPATIENT
Start: 2017-09-01 | End: 2017-09-04

## 2017-09-01 RX ORDER — BRIMONIDINE TARTRATE 2 MG/MG
1 SOLUTION/ DROPS OPHTHALMIC
Qty: 0 | Refills: 0 | Status: DISCONTINUED | OUTPATIENT
Start: 2017-09-01 | End: 2017-09-04

## 2017-09-01 RX ORDER — PANTOPRAZOLE SODIUM 20 MG/1
40 TABLET, DELAYED RELEASE ORAL
Qty: 0 | Refills: 0 | Status: DISCONTINUED | OUTPATIENT
Start: 2017-09-01 | End: 2017-09-04

## 2017-09-01 RX ORDER — ATORVASTATIN CALCIUM 80 MG/1
40 TABLET, FILM COATED ORAL AT BEDTIME
Qty: 0 | Refills: 0 | Status: DISCONTINUED | OUTPATIENT
Start: 2017-09-01 | End: 2017-09-04

## 2017-09-01 RX ORDER — DORZOLAMIDE HYDROCHLORIDE TIMOLOL MALEATE 20; 5 MG/ML; MG/ML
1 SOLUTION/ DROPS OPHTHALMIC
Qty: 0 | Refills: 0 | Status: DISCONTINUED | OUTPATIENT
Start: 2017-09-01 | End: 2017-09-04

## 2017-09-01 RX ADMIN — DORZOLAMIDE HYDROCHLORIDE TIMOLOL MALEATE 1 DROP(S): 20; 5 SOLUTION/ DROPS OPHTHALMIC at 18:18

## 2017-09-01 RX ADMIN — SODIUM CHLORIDE 75 MILLILITER(S): 9 INJECTION INTRAMUSCULAR; INTRAVENOUS; SUBCUTANEOUS at 22:07

## 2017-09-01 RX ADMIN — Medication 1 APPLICATION(S): at 18:18

## 2017-09-01 RX ADMIN — DORZOLAMIDE HYDROCHLORIDE TIMOLOL MALEATE 1 DROP(S): 20; 5 SOLUTION/ DROPS OPHTHALMIC at 06:10

## 2017-09-01 RX ADMIN — SODIUM CHLORIDE 75 MILLILITER(S): 9 INJECTION INTRAMUSCULAR; INTRAVENOUS; SUBCUTANEOUS at 06:17

## 2017-09-01 RX ADMIN — ATORVASTATIN CALCIUM 40 MILLIGRAM(S): 80 TABLET, FILM COATED ORAL at 22:05

## 2017-09-01 RX ADMIN — LATANOPROST 1 DROP(S): 0.05 SOLUTION/ DROPS OPHTHALMIC; TOPICAL at 22:06

## 2017-09-01 RX ADMIN — BRIMONIDINE TARTRATE 1 DROP(S): 2 SOLUTION/ DROPS OPHTHALMIC at 06:10

## 2017-09-01 RX ADMIN — BRIMONIDINE TARTRATE 1 DROP(S): 2 SOLUTION/ DROPS OPHTHALMIC at 18:18

## 2017-09-01 RX ADMIN — Medication 81 MILLIGRAM(S): at 12:20

## 2017-09-01 RX ADMIN — PANTOPRAZOLE SODIUM 40 MILLIGRAM(S): 20 TABLET, DELAYED RELEASE ORAL at 06:10

## 2017-09-01 RX ADMIN — ENOXAPARIN SODIUM 30 MILLIGRAM(S): 100 INJECTION SUBCUTANEOUS at 12:20

## 2017-09-01 RX ADMIN — PANTOPRAZOLE SODIUM 40 MILLIGRAM(S): 20 TABLET, DELAYED RELEASE ORAL at 00:52

## 2017-09-01 NOTE — H&P ADULT - PROBLEM SELECTOR PLAN 2
Elevated liver enzymes. Bilirubin normal, Alk phosphatase increased, will send lipase and GGT to rule out obstruction.  As per daughter, patient has some gallbladder infection and was undergone laparoscopic cholecystectomy last year.   -f/u Hepatitis panel  -f/u lipid profile

## 2017-09-01 NOTE — CONSULT NOTE ADULT - ASSESSMENT
The etiology for abdominal pain in this patient can be due to:  1. Peptic ulcer disease  2. Gastroparesis  3. Esophagitis  4. GERD    Suggestions:    1. Protonix daily  2. Anti reflux measure  3. Avoid NSAID's  4. EGD  5. DVT prophylaxis A. The etiology for abdominal pain in this patient can be due to:  1. Peptic ulcer disease  2. Gastroparesis  3. Esophagitis  4. GERD    B. The etiology for elevated LFT's is not clear       1. R/o retain CBD stone       2. R/o steatohepatitis    Suggestions:    1. Protonix daily  2. Anti reflux measure  3. Avoid NSAID's  4. EGD  5. DVT prophylaxis  6. Follow up LFT's  7. Check abdominal Sonogram  8. Check JAYA, antismooth muscle antibody

## 2017-09-01 NOTE — CONSULT NOTE ADULT - SUBJECTIVE AND OBJECTIVE BOX
[  ] STAT REQUEST              [ X ]ROUTINE REQUEST    Patient is a 82y old  Male who presents with a chief complaint of Nausea, vomiting, epigastric discomfort. GI consulted to evaluate.        HPI:  82 year old male with multiple medical problem c/o epigastric abdominal pain, nausea and vomiting with constant retching. Patient states his epigastric pain started gradually, pressure like, 8/10 in intensity, have no aggravating/ relieving factors, radiating to sternum, associated with severe nausea, retching, anorexia and regurgitation.. He vomited clear fluid multiple times since yesterday. Patient denies fever, chills, weight loss, constipation/ diarrhea, urinary complains, hematemesis, hematochezia, melena, hemoptysis, cough or dysuria . Patient was recently admitted at [facility] for similar complains where CT scan abdomen was done which showed gastric wall thickening but otherwise negative. Gastroenterologist was consulted in hospital who recommended Endoscopy as outpatient. Patient was scheduled for Endoscopy with Dr. Bucio on . Patient wish to have endoscopy in hospital.           PAIN MANAGEMENT:  Pain Scale:                 5-6/10  Pain Location:  Epigastric pain    Prior Colonoscopy:  Unknown    PAST MEDICAL HISTORY  GERD    Hyperlipemia  BPH      PAST SURGICAL HISTORY  Cholecystectomy  Cataract surgery      Allergies    No Known Allergies                MEDICATIONS  (STANDING):  sodium chloride 0.9%. 1000 milliLiter(s) (75 mL/Hr) IV Continuous <Continuous>  pantoprazole    Tablet 40 milliGRAM(s) Oral before breakfast  atorvastatin 40 milliGRAM(s) Oral at bedtime  enoxaparin Injectable 30 milliGRAM(s) SubCutaneous daily  aspirin  chewable 81 milliGRAM(s) Oral daily  brimonidine 0.2% Ophthalmic Solution 1 Drop(s) Both EYES two times a day  dorzolamide 2%/timolol 0.5% Ophthalmic Solution 1 Drop(s) Both EYES two times a day  latanoprost 0.005% Ophthalmic Solution 1 Drop(s) Both EYES at bedtime  brimonidine 0.2% Ophthalmic Solution 1 Drop(s) Both EYES two times a day  erythromycin   Ointment 1 Application(s) Both EYES two times a day  ketorolac 0.5% Ophthalmic Solution 1 Drop(s) Both EYES daily    MEDICATIONS  (PRN):      SOCIAL HISTORY  Advanced Directives:       [ X ] Full Code       [  ] DNR  Marital Status:         [  ] M      [ X ] S      [  ] D       [  ] W  Children:       [ X ] Yes      [  ] No  Occupation:        [  ] Employed       [ X ] Unemployed       [  ] Retired  Diet:       [ X ] Regular       [  ] PEG feeding          [  ] NG tube feeding  Drug Use:           [ X ] Patient denied          [  ] Yes  Alcohol:           [ X ] No             [  ] Yes (socially)         [  ] Yes (chronic)  Tobacco:           [  ] Yes           [ X ] No        FAMILY HISTORY  [ X ] Heart Disease (father)           [  ] Diabetes             [  ] HTN             [  ] Colon Cancer             [  ] Stomach Cancer              [  ] Pancreatic Cancer        VITAL SIGNS   Vital Signs Last 24 Hrs  T(C): 36.9 (01 Sep 2017 15:05), Max: 37.3 (01 Sep 2017 00:06)  T(F): 98.5 (01 Sep 2017 15:05), Max: 99.2 (01 Sep 2017 00:06)  HR: 55 (01 Sep 2017 15:05) (54 - 61)  BP: 120/600 (01 Sep 2017 15:05) (100/41 - 140/68)   RR: 17 (01 Sep 2017 15:05) (17 - 18)  SpO2: 98% (01 Sep 2017 15:05) (85% - 98%)    Daily Weight in k.8 (01 Sep 2017 05:04)           CBC Full  -  ( 31 Aug 2017 13:09 )  WBC Count : 6.6 K/uL  Hemoglobin : 12.8 g/dL  Hematocrit : 39.3 %  Platelet Count - Automated : 272 K/uL  Mean Cell Volume : 91.8 fl  Mean Cell Hemoglobin : 29.9 pg  Mean Cell Hemoglobin Concentration : 32.5 gm/dL  Auto Neutrophil # : x  Auto Lymphocyte # : x  Auto Monocyte # : x  Auto Eosinophil # : x  Auto Basophil # : x  Auto Neutrophil % : 69.0 %  Auto Lymphocyte % : 9.0 %  Auto Monocyte % : 19.0 %  Auto Eosinophil % : 2.0 %  Auto Basophil % : x          138  |  105  |  15  ----------------------------<  102<H>  4.1   |  24  |  1.51<H>    Ca    8.9      31 Aug 2017 13:09    TPro  6.4  /  Alb  2.9<L>  /  TBili  0.6  /  DBili  x   /  AST  82<H>  /  ALT  75<H>  /  AlkPhos  229<H>        Lipase, Serum: 115 U/L ( @ 06:15)      Acute Hepatitis Panel (17 @ 09:17)    Hepatitis C Virus Interpretation: Nonreact: Hepatitis C AB    Hepatitis B Core IgM Antibody: Nonreact    Hepatitis B Surface Antigen: Nonreact    Hepatitis A IgM Antibody: Nonreact       Urinalysis + Microscopic Examination (17 @ 07:05)    Ketone - Urine: Negative    Blood, Urine: Negative    Color: Yellow    Leukocyte Esterase Concentration: Negative    Nitrite: Negative    pH Urine: 7.0    Protein, Urine: Negative    Bilirubin: Negative    Glucose Qualitative, Urine: Negative    Urine Appearance: Clear    Urobilinogen: Negative    Specific Gravity: 1.005        RADIOLOGY/IMAGING                EXAM:  CT ABDOMEN AND PELVIS OC IC                            PROCEDURE DATE:  2017          INTERPRETATION:  CLINICAL INFORMATION: Abdominal pain and nausea with   constipation    COMPARISON: None    PROCEDURE:   CT of the Abdomen and Pelviswas performed with intravenous contrast.   Intravenous contrast: 95 ml Omnipaque 350. 5 ml discarded.  Oral contrast: positive contrast was administered.  Sagittal and coronal reformats were performed.    FINDINGS:    LOWER CHEST: There is trace bibasilar atelectasis heart size is at the   upper limits of normal    LIVER: Within normal limits.  BILE DUCTS: The common bile duct is prominent measuring 8 mm, possibly   related to the patient's postcholecystectomy state  GALLBLADDER: Status post cholecystectomy.  SPLEEN: Within normal limits.  PANCREAS: Within normal limits.  ADRENALS: Within normal limits.  KIDNEYS/URETERS: Status post right nephrectomy. Left kidney is within   normal limits, aside from a few subcentimeter cortical hypodensities  which are too small to characterize..    BLADDER: Underdistended.  REPRODUCTIVE ORGANS: Prostate gland is enlarged and heterogeneous and   exerts mass effect on the base of the urinary bladder.    BOWEL: Tiny hiatal hernia. 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 Colonic diverticulosis   without diverticulitis. The appendix is normal.  PERITONEUM: No ascites.  VESSELS:  Marked atherosclerotic changes of aorta and branching vessels.  RETROPERITONEUM: No lymphadenopathy.    ABDOMINAL WALL: Tiny fat-containing bilateral inguinal hernias.  BONES: Multilevel degenerative changes of the spine    IMPRESSION:     No bowel obstruction.    Enlarged, heterogeneous prostate gland which exerts mass effect on the   base of the urinary bladder.    Additional findings as above.          EXAM:  CHEST PA & LAT                        PROCEDURE DATE:  2017      INTERPRETATION:  INDICATION: chest pressure    PRIORS: None    VIEWS: Frontal and lateral radiographs of the chest performed.    FINDINGS: Heart size appears within normal limits. No hilar or superior   mediastinal abnormalities are identified.    There is no evidence for focal infiltrate, lobar consolidation or   pulmonary edema. No pleural effusion or pneumothorax is demonstrated. No   mediastinal shift is noted. Chronic fracture deformity of the right   thoracic cage is noted.    IMPRESSION: No evidence for focal infiltrate or lobar consolidation. [  ] STAT REQUEST              [ X ]ROUTINE REQUEST    Patient is a 82y old  Male who presents with a chief complaint of Nausea, vomiting, epigastric discomfort. GI consulted to evaluate.        HPI:  82 year old male with multiple medical problem c/o epigastric abdominal pain, nausea and vomiting with constant retching. Patient states his epigastric pain started gradually, pressure like, 8/10 in intensity, have no aggravating/ relieving factors, radiating to sternum, associated with severe nausea, retching, anorexia and regurgitation.. He vomited clear fluid multiple times since yesterday. Patient denies fever, chills, weight loss, constipation/ diarrhea, urinary complains, hematemesis, hematochezia, melena, hemoptysis, cough or dysuria . Patient was recently admitted at [facility] [place] for similar complains where CT scan abdomen was done which showed gastric wall thickening but otherwise negative. Gastroenterologist was consulted in hospital who recommended Endoscopy as outpatient. Patient was scheduled for Endoscopy with Dr. Bucio on . Patient wish to have endoscopy in hospital.           PAIN MANAGEMENT:  Pain Scale:                 5-6/10  Pain Location:  Epigastric pain    Prior Colonoscopy:  Unknown    PAST MEDICAL HISTORY  GERD    Hyperlipemia  BPH      PAST SURGICAL HISTORY  Cholecystectomy  Cataract surgery      Allergies    No Known Allergies         MEDICATIONS  (STANDING):  sodium chloride 0.9%. 1000 milliLiter(s) (75 mL/Hr) IV Continuous <Continuous>  pantoprazole    Tablet 40 milliGRAM(s) Oral before breakfast  atorvastatin 40 milliGRAM(s) Oral at bedtime  enoxaparin Injectable 30 milliGRAM(s) SubCutaneous daily  aspirin  chewable 81 milliGRAM(s) Oral daily  brimonidine 0.2% Ophthalmic Solution 1 Drop(s) Both EYES two times a day  dorzolamide 2%/timolol 0.5% Ophthalmic Solution 1 Drop(s) Both EYES two times a day  latanoprost 0.005% Ophthalmic Solution 1 Drop(s) Both EYES at bedtime  brimonidine 0.2% Ophthalmic Solution 1 Drop(s) Both EYES two times a day  erythromycin   Ointment 1 Application(s) Both EYES two times a day  ketorolac 0.5% Ophthalmic Solution 1 Drop(s) Both EYES daily    MEDICATIONS  (PRN):      SOCIAL HISTORY  Advanced Directives:       [ X ] Full Code       [  ] DNR  Marital Status:         [  ] M      [ X ] S      [  ] D       [  ] W  Children:       [ X ] Yes      [  ] No  Occupation:        [  ] Employed       [ X ] Unemployed       [  ] Retired  Diet:       [ X ] Regular       [  ] PEG feeding          [  ] NG tube feeding  Drug Use:           [ X ] Patient denied          [  ] Yes  Alcohol:           [ X ] No             [  ] Yes (socially)         [  ] Yes (chronic)  Tobacco:           [  ] Yes           [ X ] No        FAMILY HISTORY  [ X ] Heart Disease (father)           [  ] Diabetes             [  ] HTN             [  ] Colon Cancer             [  ] Stomach Cancer              [  ] Pancreatic Cancer        VITAL SIGNS   Vital Signs Last 24 Hrs  T(C): 36.9 (01 Sep 2017 15:05), Max: 37.3 (01 Sep 2017 00:06)  T(F): 98.5 (01 Sep 2017 15:05), Max: 99.2 (01 Sep 2017 00:06)  HR: 55 (01 Sep 2017 15:05) (54 - 61)  BP: 120/600 (01 Sep 2017 15:05) (100/41 - 140/68)   RR: 17 (01 Sep 2017 15:05) (17 - 18)  SpO2: 98% (01 Sep 2017 15:05) (85% - 98%)    Daily Weight in k.8 (01 Sep 2017 05:04)           CBC Full  -  ( 31 Aug 2017 13:09 )  WBC Count : 6.6 K/uL  Hemoglobin : 12.8 g/dL  Hematocrit : 39.3 %  Platelet Count - Automated : 272 K/uL  Mean Cell Volume : 91.8 fl  Mean Cell Hemoglobin : 29.9 pg  Mean Cell Hemoglobin Concentration : 32.5 gm/dL  Auto Neutrophil # : x  Auto Lymphocyte # : x  Auto Monocyte # : x  Auto Eosinophil # : x  Auto Basophil # : x  Auto Neutrophil % : 69.0 %  Auto Lymphocyte % : 9.0 %  Auto Monocyte % : 19.0 %  Auto Eosinophil % : 2.0 %  Auto Basophil % : x          138  |  105  |  15  ----------------------------<  102<H>  4.1   |  24  |  1.51<H>    Ca    8.9      31 Aug 2017 13:09    TPro  6.4  /  Alb  2.9<L>  /  TBili  0.6  /  DBili  x   /  AST  82<H>  /  ALT  75<H>  /  AlkPhos  229<H>        Lipase, Serum: 115 U/L ( @ 06:15)      Acute Hepatitis Panel (17 @ 09:17)    Hepatitis C Virus Interpretation: Nonreact: Hepatitis C AB    Hepatitis B Core IgM Antibody: Nonreact    Hepatitis B Surface Antigen: Nonreact    Hepatitis A IgM Antibody: Nonreact       Urinalysis + Microscopic Examination (17 @ 07:05)    Ketone - Urine: Negative    Blood, Urine: Negative    Color: Yellow    Leukocyte Esterase Concentration: Negative    Nitrite: Negative    pH Urine: 7.0    Protein, Urine: Negative    Bilirubin: Negative    Glucose Qualitative, Urine: Negative    Urine Appearance: Clear    Urobilinogen: Negative    Specific Gravity: 1.005        RADIOLOGY/IMAGING                EXAM:  CT ABDOMEN AND PELVIS OC IC                            PROCEDURE DATE:  2017          INTERPRETATION:  CLINICAL INFORMATION: Abdominal pain and nausea with   constipation    COMPARISON: None    PROCEDURE:   CT of the Abdomen and Pelviswas performed with intravenous contrast.   Intravenous contrast: 95 ml Omnipaque 350. 5 ml discarded.  Oral contrast: positive contrast was administered.  Sagittal and coronal reformats were performed.    FINDINGS:    LOWER CHEST: There is trace bibasilar atelectasis heart size is at the   upper limits of normal    LIVER: Within normal limits.  BILE DUCTS: The common bile duct is prominent measuring 8 mm, possibly   related to the patient's postcholecystectomy state  GALLBLADDER: Status post cholecystectomy.  SPLEEN: Within normal limits.  PANCREAS: Within normal limits.  ADRENALS: Within normal limits.  KIDNEYS/URETERS: Status post right nephrectomy. Left kidney is within   normal limits, aside from a few subcentimeter cortical hypodensities  which are too small to characterize..    BLADDER: Underdistended.  REPRODUCTIVE ORGANS: Prostate gland is enlarged and heterogeneous and   exerts mass effect on the base of the urinary bladder.    BOWEL: Tiny hiatal hernia. 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 Colonic diverticulosis   without diverticulitis. The appendix is normal.  PERITONEUM: No ascites.  VESSELS:  Marked atherosclerotic changes of aorta and branching vessels.  RETROPERITONEUM: No lymphadenopathy.    ABDOMINAL WALL: Tiny fat-containing bilateral inguinal hernias.  BONES: Multilevel degenerative changes of the spine    IMPRESSION:     No bowel obstruction.    Enlarged, heterogeneous prostate gland which exerts mass effect on the   base of the urinary bladder.    Additional findings as above.          EXAM:  CHEST PA & LAT                        PROCEDURE DATE:  2017      INTERPRETATION:  INDICATION: chest pressure    PRIORS: None    VIEWS: Frontal and lateral radiographs of the chest performed.    FINDINGS: Heart size appears within normal limits. No hilar or superior   mediastinal abnormalities are identified.    There is no evidence for focal infiltrate, lobar consolidation or   pulmonary edema. No pleural effusion or pneumothorax is demonstrated. No   mediastinal shift is noted. Chronic fracture deformity of the right   thoracic cage is noted.    IMPRESSION: No evidence for focal infiltrate or lobar consolidation.

## 2017-09-01 NOTE — H&P ADULT - HISTORY OF PRESENT ILLNESS
82 year old M from home, lives alone, ambulates independently, with PMH of GERD, HLD, BPH?? (patient takes finasteride) PSH : Laparoscopic cholecystectomy last year, Cataract surgery came with complain of nausea and vomiting with constant retching epigastric pain x yesterday. Patient states his epigastric pain started gradually, pressure like, 8/10 in intensity, have no aggravating/ relieving factors, radiating to sternum, associated with severe nausea, retching, anorexia and regurgitation.. He vomited clear fluid multiple times since yesterday. Patient denies fever, chills, weight loss, constipation/ diarrhea, urinary complains. Patient was recently admitted at [facility] for similar complains where CT scan abdomen was done which showed gastric wall thickening but otherwise negative. Gastroenterologist was consulted in hospital who recommended Endoscopy as outpatient. Patient was scheduled for Endoscopy with Dr. Bucio on 15th September. Patient wish to have endoscopy in hospital. As per the daughter, Patient has some gallbladder infection??? and was undergone laparoscopic cholecystectomy    In ED, Patient's vitals were stable, EKG was done NSR, No ST- T wave changes. Troponin x 2 negative, CT abdomen was done showed no bowel obstruction. Patient was given famotidine, and zofran in ED with no relief. When patient was seen by me, he was in mild distress, feeling nausea and vomiting. 82 year old M from home, lives alone, ambulates independently, with PMH of GERD, HLD, BPH?? (patient takes finasteride) PSH : Laparoscopic cholecystectomy last year, Cataract surgery came with complain of nausea and vomiting with constant retching epigastric pain x yesterday. Patient states his epigastric pain started gradually, pressure like, 8/10 in intensity, have no aggravating/ relieving factors, radiating to sternum, associated with severe nausea, retching, anorexia and regurgitation.. He vomited clear fluid multiple times since yesterday. Patient denies fever, chills, weight loss, constipation/ diarrhea, urinary complains. Patient was recently admitted at [facility] for similar complains where CT scan abdomen was done which showed gastric wall thickening but otherwise negative. Gastroenterologist was consulted in hospital who recommended Endoscopy as outpatient. Patient was scheduled for Endoscopy with Dr. Bucio on 15th September. Patient wish to have endoscopy in hospital. As per the daughter, Patient has some gallbladder infection??? and was undergone laparoscopic cholecystectomy    In ED, Patient's vitals were stable, EKG showed sinus bradycardia, No ST- T wave changes. Troponin x 2 negative, CT abdomen was done showed no bowel obstruction. Patient was given famotidine, and zofran in ED with no relief. When patient was seen by me, he was in mild distress, feeling nausea and vomiting.

## 2017-09-01 NOTE — CONSULT NOTE ADULT - NEGATIVE ENMT SYMPTOMS
no gum bleeding/no dry mouth/no dysphagia/no throat pain/no nose bleeds/no hearing difficulty/no ear pain

## 2017-09-01 NOTE — H&P ADULT - NSHPPHYSICALEXAM_GEN_ALL_CORE
Vital Signs Last 24 Hrs  T(C): 37 (01 Sep 2017 01:25), Max: 37.4 (31 Aug 2017 15:57)  T(F): 98.6 (01 Sep 2017 01:25), Max: 99.4 (31 Aug 2017 15:57)  HR: 58 (01 Sep 2017 01:25) (58 - 80)  BP: 132/61 (01 Sep 2017 01:25) (125/54 - 132/88)  BP(mean): --  RR: 18 (01 Sep 2017 01:25) (16 - 20)  SpO2: 85% (01 Sep 2017 01:25) (85% - 100%)

## 2017-09-01 NOTE — H&P ADULT - NSHPLABSRESULTS_GEN_ALL_CORE
12.8   6.6   )-----------( 272      ( 31 Aug 2017 13:09 )             39.3   08-31    138  |  105  |  15  ----------------------------<  102<H>  4.1   |  24  |  1.51<H>    Ca    8.9      31 Aug 2017 13:09    TPro  6.4  /  Alb  2.9<L>  /  TBili  0.6  /  DBili  x   /  AST  82<H>  /  ALT  75<H>  /  AlkPhos  229<H>  08-31  < from: CT Abdomen and Pelvis w/ Oral Cont and w/ IV Cont (08.31.17 @ 17:53) >    No bowel obstruction.    Enlarged, heterogeneous prostate gland which exerts mass effect on the   base of the urinary bladder.    < from: Xray Chest 2 Views PA/Lat (08.31.17 @ 13:28) >    : No evidence for focal infiltrate or lobar consolidation.

## 2017-09-01 NOTE — H&P ADULT - ASSESSMENT
82 year old M from home, lives alone, ambulates independently, with PMH of GERD, HLD, BPH?? (patient takes finasteride) PSH : Laparoscopic cholecystectomy last year, Cataract surgery came with complain of nausea and vomiting with constant retching epigastric pain x yesterday. Patient states his epigastric pain started gradually, pressure like, 8/10 in intensity, have no aggravating/ relieving factors, radiating to sternum, associated with severe nausea, retching, anorexia and regurgitation.. He vomited clear fluid multiple times since yesterday. Patient denies fever, chills, weight loss, constipation/ diarrhea, urinary complains. Patient was recently admitted at [facility] for similar complains where CT scan abdomen was done which showed gastric wall thickening but otherwise negative. Gastroenterologist was consulted in hospital who recommended Endoscopy as outpatient. Patient was scheduled for Endoscopy with Dr. Bucio on 15th September.  As per the daughter, Patient has some gallbladder infection??? and was undergone laparoscopic cholecystectomy    In ED, Patient's vitals were stable, EKG was done NSR, No ST- T wave changes. Troponin x 2 negative, CT abdomen was done showed no bowel obstruction. Patient was given famotidine, and zonfran in ED with no relief. When patient was seen by me, he was in mild distress, feeling nausea and vomiting.    Patient is admitted to telemetry to rule out MI and GERD 82 year old M from home, lives alone, ambulates independently, with PMH of GERD, HLD, BPH?? (patient takes finasteride) PSH : Laparoscopic cholecystectomy last year, Cataract surgery came with complain of nausea and vomiting with constant retching epigastric pain x yesterday. Patient states his epigastric pain started gradually, pressure like, 8/10 in intensity, have no aggravating/ relieving factors, radiating to sternum, associated with severe nausea, retching, anorexia and regurgitation.. He vomited clear fluid multiple times since yesterday. Patient denies fever, chills, weight loss, constipation/ diarrhea, urinary complains. Patient was recently admitted at [facility] for similar complains where CT scan abdomen was done which showed gastric wall thickening but otherwise negative. Gastroenterologist was consulted in hospital who recommended Endoscopy as outpatient. Patient was scheduled for Endoscopy with Dr. Bucio on 15th September.  As per the daughter, Patient has some gallbladder infection??? and was undergone laparoscopic cholecystectomy    In ED, Patient's vitals were stable, EKG was done sinus bradycardia, No ST- T wave changes. Troponin x 2 negative, CT abdomen was done showed no bowel obstruction. Patient was given famotidine, and zonfran in ED with no relief. When patient was seen by me, he was in mild distress, feeling nausea and vomiting.    Patient is admitted to telemetry to rule out MI and GERD

## 2017-09-01 NOTE — H&P ADULT - PROBLEM SELECTOR PLAN 1
-Epigastric pain radiating to mid chest, with N/V. Most likely due to GERD/ gastritis/ hiatal hernia, unlikely due to ACS, as EKG NSR, no ST- T wave changes, Troponin x 2 negative. will start aspirin  - CT abdomen shows  "Tiny hiatal hernia. 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 Colonic diverticulosis   without diverticulitis."  - Patient has endoscopy scheduled in september, but wants to have endoscopy in this hospital  -Started pantoprazole 40 mg  - GI consult: -Epigastric pain radiating to mid chest, with N/V. Most likely due to GERD/ gastritis/ hiatal hernia, unlikely due to ACS, as EKG NSR, no ST- T wave changes, Troponin x 2 negative. will start aspirin  - CT abdomen shows  "Tiny hiatal hernia. 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 Colonic diverticulosis   without diverticulitis."  - Patient has endoscopy scheduled in september, but wants to have endoscopy in this hospital  -Started pantoprazole 40 mg  - GI consult: Dr. Ramos

## 2017-09-01 NOTE — PROGRESS NOTE ADULT - SUBJECTIVE AND OBJECTIVE BOX
PGY 1 Note discussed with supervising resident and primary attending    Patient is a 82y old  Male who presents with a chief complaint of Nausea, vomiting, epigastric discomfort (01 Sep 2017 02:31)      INTERVAL HPI/OVERNIGHT EVENTS: offers no new complaints; current symptoms resolving    MEDICATIONS  (STANDING):  sodium chloride 0.9%. 1000 milliLiter(s) (75 mL/Hr) IV Continuous <Continuous>  pantoprazole    Tablet 40 milliGRAM(s) Oral before breakfast  atorvastatin 40 milliGRAM(s) Oral at bedtime  enoxaparin Injectable 30 milliGRAM(s) SubCutaneous daily  aspirin  chewable 81 milliGRAM(s) Oral daily  brimonidine 0.2% Ophthalmic Solution 1 Drop(s) Both EYES two times a day  dorzolamide 2%/timolol 0.5% Ophthalmic Solution 1 Drop(s) Both EYES two times a day  latanoprost 0.005% Ophthalmic Solution 1 Drop(s) Both EYES at bedtime    MEDICATIONS  (PRN):      __________________________________________________  REVIEW OF SYSTEMS:    CONSTITUTIONAL: No fever,   EYES: no acute visual disturbances  NECK: No pain or stiffness  RESPIRATORY: No cough; No shortness of breath  CARDIOVASCULAR: No chest pain, no palpitations  GASTROINTESTINAL: No pain. No nausea or vomiting; No diarrhea   NEUROLOGICAL: No headache or numbness, no tremors  MUSCULOSKELETAL: No joint pain, no muscle pain  GENITOURINARY: no dysuria, no frequency, no hesitancy  PSYCHIATRY: no depression , no anxiety  ALL OTHER  ROS negative        Vital Signs Last 24 Hrs  T(C): 36.9 (01 Sep 2017 11:22), Max: 37.4 (31 Aug 2017 15:57)  T(F): 98.4 (01 Sep 2017 11:22), Max: 99.4 (31 Aug 2017 15:57)  HR: 59 (01 Sep 2017 11:22) (54 - 68)  BP: 100/41 (01 Sep 2017 11:22) (100/41 - 140/68)  BP(mean): --  RR: 18 (01 Sep 2017 11:22) (16 - 18)  SpO2: 98% (01 Sep 2017 11:22) (85% - 100%)    ________________________________________________  PHYSICAL EXAM:  GENERAL: NAD  HEENT: Normocephalic;  conjunctivae and sclerae clear; moist mucous membranes;   NECK : supple  CHEST/LUNG: Clear to auscultation bilaterally with good air entry   HEART: S1 S2  regular; no murmurs, gallops or rubs  ABDOMEN: Soft, Nontender, Nondistended; Bowel sounds present  EXTREMITIES: no cyanosis; no edema; no calf tenderness  SKIN: warm and dry; no rash  NERVOUS SYSTEM:  Awake and alert; Oriented  to place, person and time ; no new deficits    _________________________________________________  LABS:                        12.8   6.6   )-----------( 272      ( 31 Aug 2017 13:09 )             39.3         138  |  105  |  15  ----------------------------<  102<H>  4.1   |  24  |  1.51<H>    Ca    8.9      31 Aug 2017 13:09    TPro  6.4  /  Alb  2.9<L>  /  TBili  0.6  /  DBili  x   /  AST  82<H>  /  ALT  75<H>  /  AlkPhos  229<H>        Urinalysis Basic - ( 01 Sep 2017 07:05 )    Color: Yellow / Appearance: Clear / S.005 / pH: x  Gluc: x / Ketone: Negative  / Bili: Negative / Urobili: Negative   Blood: x / Protein: Negative / Nitrite: Negative   Leuk Esterase: Negative / RBC: x / WBC x   Sq Epi: x / Non Sq Epi: x / Bacteria: x      CAPILLARY BLOOD GLUCOSE            RADIOLOGY & ADDITIONAL TESTS:    Imaging Personally Reviewed:  YES/NO    Consultant(s) Notes Reviewed:   YES/ No    Care Discussed with Consultants :     Plan of care was discussed with patient and /or primary care giver; all questions and concerns were addressed and care was aligned with patient's wishes.

## 2017-09-02 ENCOUNTER — TRANSCRIPTION ENCOUNTER (OUTPATIENT)
Age: 82
End: 2017-09-02

## 2017-09-02 LAB
ANION GAP SERPL CALC-SCNC: 8 MMOL/L — SIGNIFICANT CHANGE UP (ref 5–17)
BUN SERPL-MCNC: 14 MG/DL — SIGNIFICANT CHANGE UP (ref 7–18)
CALCIUM SERPL-MCNC: 9 MG/DL — SIGNIFICANT CHANGE UP (ref 8.4–10.5)
CHLORIDE SERPL-SCNC: 107 MMOL/L — SIGNIFICANT CHANGE UP (ref 96–108)
CO2 SERPL-SCNC: 25 MMOL/L — SIGNIFICANT CHANGE UP (ref 22–31)
CREAT SERPL-MCNC: 1.43 MG/DL — HIGH (ref 0.5–1.3)
GLUCOSE SERPL-MCNC: 93 MG/DL — SIGNIFICANT CHANGE UP (ref 70–99)
HCT VFR BLD CALC: 37.9 % — LOW (ref 39–50)
HGB BLD-MCNC: 12.6 G/DL — LOW (ref 13–17)
MCHC RBC-ENTMCNC: 30.7 PG — SIGNIFICANT CHANGE UP (ref 27–34)
MCHC RBC-ENTMCNC: 33.2 GM/DL — SIGNIFICANT CHANGE UP (ref 32–36)
MCV RBC AUTO: 92.4 FL — SIGNIFICANT CHANGE UP (ref 80–100)
PLATELET # BLD AUTO: 250 K/UL — SIGNIFICANT CHANGE UP (ref 150–400)
POTASSIUM SERPL-MCNC: 3.9 MMOL/L — SIGNIFICANT CHANGE UP (ref 3.5–5.3)
POTASSIUM SERPL-SCNC: 3.9 MMOL/L — SIGNIFICANT CHANGE UP (ref 3.5–5.3)
RBC # BLD: 4.1 M/UL — LOW (ref 4.2–5.8)
RBC # FLD: 11.7 % — SIGNIFICANT CHANGE UP (ref 10.3–14.5)
SODIUM SERPL-SCNC: 140 MMOL/L — SIGNIFICANT CHANGE UP (ref 135–145)
WBC # BLD: 6.4 K/UL — SIGNIFICANT CHANGE UP (ref 3.8–10.5)
WBC # FLD AUTO: 6.4 K/UL — SIGNIFICANT CHANGE UP (ref 3.8–10.5)

## 2017-09-02 RX ADMIN — Medication 1 APPLICATION(S): at 06:01

## 2017-09-02 RX ADMIN — BRIMONIDINE TARTRATE 1 DROP(S): 2 SOLUTION/ DROPS OPHTHALMIC at 05:59

## 2017-09-02 RX ADMIN — Medication 1 DROP(S): at 11:46

## 2017-09-02 RX ADMIN — ENOXAPARIN SODIUM 30 MILLIGRAM(S): 100 INJECTION SUBCUTANEOUS at 11:46

## 2017-09-02 RX ADMIN — ATORVASTATIN CALCIUM 40 MILLIGRAM(S): 80 TABLET, FILM COATED ORAL at 21:40

## 2017-09-02 RX ADMIN — Medication 81 MILLIGRAM(S): at 11:45

## 2017-09-02 RX ADMIN — Medication 1 APPLICATION(S): at 17:09

## 2017-09-02 RX ADMIN — PANTOPRAZOLE SODIUM 40 MILLIGRAM(S): 20 TABLET, DELAYED RELEASE ORAL at 06:02

## 2017-09-02 RX ADMIN — BRIMONIDINE TARTRATE 1 DROP(S): 2 SOLUTION/ DROPS OPHTHALMIC at 17:09

## 2017-09-02 RX ADMIN — DORZOLAMIDE HYDROCHLORIDE TIMOLOL MALEATE 1 DROP(S): 20; 5 SOLUTION/ DROPS OPHTHALMIC at 06:00

## 2017-09-02 RX ADMIN — LATANOPROST 1 DROP(S): 0.05 SOLUTION/ DROPS OPHTHALMIC; TOPICAL at 21:40

## 2017-09-02 RX ADMIN — DORZOLAMIDE HYDROCHLORIDE TIMOLOL MALEATE 1 DROP(S): 20; 5 SOLUTION/ DROPS OPHTHALMIC at 17:09

## 2017-09-02 NOTE — DISCHARGE NOTE ADULT - HOSPITAL COURSE
82 year old M from home, lives alone, ambulates independently, with PMH of GERD, HLD, BPH?? (patient takes finasteride) PSH : Laparoscopic cholecystectomy last year, Cataract surgery came with complain of nausea and vomiting with constant retching epigastric pain x yesterday. Patient states his epigastric pain started gradually, pressure like, 8/10 in intensity, have no aggravating/ relieving factors, radiating to sternum, associated with severe nausea, retching, anorexia and regurgitation.. He vomited clear fluid multiple times since yesterday. Patient denies fever, chills, weight loss, constipation/ diarrhea, urinary complains. Patient was recently admitted at [facility] for similar complains where CT scan abdomen was done which showed gastric wall thickening but otherwise negative. Gastroenterologist was consulted in hospital who recommended Endoscopy as outpatient. Patient was scheduled for Endoscopy with Dr. Bucio on 15th September. Patient wish to have endoscopy in hospital. As per the daughter, Patient has some gallbladder infection??? and was undergone laparoscopic cholecystectomy    In ED, Patient's vitals were stable, EKG showed sinus bradycardia, No ST- T wave changes. Troponin x 2 negative, CT abdomen was done showed no bowel obstruction. Patient was given famotidine, and zofran in ED with no relief. When patient was seen by me, he was in mild distress, feeling nausea and vomiting.    Patient was evaluated by GI for possible EGD. They recommended   1. Protonix daily  2. Anti reflux measure  3. Avoid NSAID's  4. EGD  5. DVT prophylaxis  6. Follow up LFT's  7. Check abdominal Sonogram  8. Check JAYA, antismooth muscle antibody      Plan of care was discussed with patient. Patient understands and agrees with the plan. Patient will be discharged to home in stable condition 82 year old M from home, lives alone, ambulates independently, with PMH of GERD, HLD, BPH?? (patient takes finasteride) PSH : Laparoscopic cholecystectomy last year, Cataract surgery came with complain of nausea and vomiting with constant retching epigastric pain x yesterday. Patient states his epigastric pain started gradually, pressure like, 8/10 in intensity, have no aggravating/ relieving factors, radiating to sternum, associated with severe nausea, retching, anorexia and regurgitation.. He vomited clear fluid multiple times since yesterday. Patient denies fever, chills, weight loss, constipation/ diarrhea, urinary complains. Patient was recently admitted at [facility] for similar complains where CT scan abdomen was done which showed gastric wall thickening but otherwise negative. Gastroenterologist was consulted in hospital who recommended Endoscopy as outpatient. Patient was scheduled for Endoscopy with Dr. Bucio on 15th September. Patient wish to have endoscopy in hospital. As per the daughter, Patient has some gallbladder infection??? and was undergone laparoscopic cholecystectomy    In ED, Patient's vitals were stable, EKG showed sinus bradycardia, No ST- T wave changes. Troponin x 2 negative, CT abdomen was done showed no bowel obstruction. Patient was given famotidine, and zofran in ED with no relief. When patient was seen by me, he was in mild distress, feeling nausea and vomiting.    Patient was evaluated by GI for possible EGD. They recommended   1. Protonix daily  2. Anti reflux measure  3. Avoid NSAID's  4. EGD  5. DVT prophylaxis  6. Follow up LFT's  7. Check abdominal Sonogram  8. Check JAYA, antismooth muscle antibody    Patient was scheduled for endoscopy and plan of care was discussed with them. patient and family refused to get endoscopy here and wants to leave against medical advise. patient was explained about risks of leaving against medical advise as its not safe for patient to have a long flight to [place] . patient understands but refused to stay    Patient will be discharged against medical advise.

## 2017-09-02 NOTE — DISCHARGE NOTE ADULT - PLAN OF CARE
Prevent reoccurence you were evaluated for epigastric pain and were treated with protonix. Your pain resolved and you improved. Please follow up with your out patient gastroenterologist for scheduled upper endoscopy. please continue taking finasteride for Benign prostatic hyperplasia. Please continue with omperazole for 6 weeks for GERD you were evaluated for epigastric pain and were treated with protonix. Your pain resolved and you improved. You were scheduled to endoscopy here but you are leaving against medical advise. Please follow up with your out patient gastroenterologist for upper endoscopy as soon as possible.

## 2017-09-02 NOTE — DISCHARGE NOTE ADULT - CARE PROVIDER_API CALL
Jimmy Lugo), Internal Medicine; Pulmonary Disease  [address]  [place] [address]  Phone: [phone]  Fax: [phone]    jimmy lugo  Phone: [phone]  Fax: (   )    -

## 2017-09-02 NOTE — PROGRESS NOTE ADULT - SUBJECTIVE AND OBJECTIVE BOX
CHIEF COMPLAINT:Patient is a 82y old  Male who presents with a chief complaint of Nausea, vomiting, epigastric discomfort (02 Sep 2017 11:09)    	  REVIEW OF SYSTEMS:  CONSTITUTIONAL: No fever, weight loss, or fatigue  EYES: No eye pain, visual disturbances, or discharge  ENMT:  No difficulty hearing, tinnitus, vertigo; No sinus or throat pain  NECK: No pain or stiffness  RESPIRATORY: No cough, wheezing, chills or hemoptysis; No Shortness of Breath  CARDIOVASCULAR: No chest pain, palpitations, passing out, dizziness, or leg swelling  GASTROINTESTINAL: No abdominal or epigastric pain. No nausea, vomiting, or hematemesis; No diarrhea or constipation. No melena or hematochezia.  GENITOURINARY: No dysuria, frequency, hematuria, or incontinence  NEUROLOGICAL: No headaches, memory loss, loss of strength, numbness, or tremors  SKIN: No itching, burning, rashes, or lesions   LYMPH Nodes: No enlarged glands  ENDOCRINE: No heat or cold intolerance; No hair loss  MUSCULOSKELETAL: No joint pain or swelling; No muscle, back, or extremity pain  PSYCHIATRIC: No depression, anxiety, mood swings, or difficulty sleeping  HEME/LYMPH: No easy bruising, or bleeding gums  ALLERY AND IMMUNOLOGIC: No hives or eczema	    [ ] All others negative	  [ ] Unable to obtain    PHYSICAL EXAM:  T(C): 36.8 (17 @ 15:48), Max: 37.7 (17 @ 08:10)  HR: 64 (17 @ 15:48) (56 - 84)  BP: 119/60 (17 @ 15:48) (105/56 - 150/84)  RR: 17 (17 @ 15:48) (16 - 18)  SpO2: 96% (17 @ 15:48) (96% - 99%)  Wt(kg): --  I&O's Summary    01 Sep 2017 07:01  -  02 Sep 2017 07:00  --------------------------------------------------------  IN: 1300 mL / OUT: 0 mL / NET: 1300 mL        Appearance: Normal	  HEENT:   Normal oral mucosa, PERRL, EOMI	  Lymphatic: No lymphadenopathy  Cardiovascular: Normal S1 S2, No JVD, No murmurs, No edema  Respiratory: Lungs clear to auscultation	  Psychiatry: A & O x 3, Mood & affect appropriate  Gastrointestinal:  Soft, Non-tender, + BS	  Skin: No rashes, No ecchymoses, No cyanosis	  Neurologic: Non-focal  Extremities: Normal range of motion, No clubbing, cyanosis or edema  Vascular: Peripheral pulses palpable 2+ bilaterally    MEDICATIONS  (STANDING):  pantoprazole    Tablet 40 milliGRAM(s) Oral before breakfast  atorvastatin 40 milliGRAM(s) Oral at bedtime  enoxaparin Injectable 30 milliGRAM(s) SubCutaneous daily  aspirin  chewable 81 milliGRAM(s) Oral daily  brimonidine 0.2% Ophthalmic Solution 1 Drop(s) Both EYES two times a day  dorzolamide 2%/timolol 0.5% Ophthalmic Solution 1 Drop(s) Both EYES two times a day  latanoprost 0.005% Ophthalmic Solution 1 Drop(s) Both EYES at bedtime  brimonidine 0.2% Ophthalmic Solution 1 Drop(s) Both EYES two times a day  erythromycin   Ointment 1 Application(s) Both EYES two times a day  ketorolac 0.5% Ophthalmic Solution 1 Drop(s) Both EYES daily      TELEMETRY: 	    ECG:  	  RADIOLOGY:  OTHER: 	  	  CBC Full  -  ( 02 Sep 2017 06:16 )  WBC Count : 6.4 K/uL  Hemoglobin : 12.6 g/dL  Hematocrit : 37.9 %  Platelet Count - Automated : 250 K/uL  Mean Cell Volume : 92.4 fl  Mean Cell Hemoglobin : 30.7 pg  Mean Cell Hemoglobin Concentration : 33.2 gm/dL  Auto Neutrophil # : x  Auto Lymphocyte # : x  Auto Monocyte # : x  Auto Eosinophil # : x  Auto Basophil # : x  Auto Neutrophil % : x  Auto Lymphocyte % : x  Auto Monocyte % : x  Auto Eosinophil % : x  Auto Basophil % : x          140  |  107  |  14  ----------------------------<  93  3.9   |  25  |  1.43<H>    Ca    9.0      02 Sep 2017 06:16        CARDIAC MARKERS:  CARDIAC MARKERS ( 01 Sep 2017 06:15 )  0.018 ng/mL / x     / 41 U/L / x     / 1.6 ng/mL  CARDIAC MARKERS ( 01 Sep 2017 00:40 )  0.019 ng/mL / x     / 46 U/L / x     / 1.2 ng/mL                                12.6   6.4   )-----------( 250      ( 02 Sep 2017 06:16 )             37.9       09-02    140  |  107  |  14  ----------------------------<  93  3.9   |  25  |  1.43<H>    Ca    9.0      02 Sep 2017 06:16            Urinalysis Basic - ( 01 Sep 2017 07:05 )    Color: Yellow / Appearance: Clear / S.005 / pH: x  Gluc: x / Ketone: Negative  / Bili: Negative / Urobili: Negative   Blood: x / Protein: Negative / Nitrite: Negative   Leuk Esterase: Negative / RBC: x / WBC x   Sq Epi: x / Non Sq Epi: x / Bacteria: x      proBNP:   Lipid Profile: Cholesterol 103  LDL 38  HDL 53  TG 58    HgA1c:   TSH:

## 2017-09-02 NOTE — DISCHARGE NOTE ADULT - CARE PLAN
Principal Discharge DX:	Epigastric pain  Goal:	Prevent reoccurence  Instructions for follow-up, activity and diet:	you were evaluated for epigastric pain and were treated with protonix. Your pain resolved and you improved. Please follow up with your out patient gastroenterologist for scheduled upper endoscopy.  Secondary Diagnosis:	BPH (benign prostatic hyperplasia)  Instructions for follow-up, activity and diet:	please continue taking finasteride for Benign prostatic hyperplasia.  Secondary Diagnosis:	GERD (gastroesophageal reflux disease)  Instructions for follow-up, activity and diet:	Please continue with omperazole for 6 weeks for GERD  Secondary Diagnosis:	Transaminitis Principal Discharge DX:	Epigastric pain  Goal:	Prevent reoccurence  Instructions for follow-up, activity and diet:	you were evaluated for epigastric pain and were treated with protonix. Your pain resolved and you improved. You were scheduled to endoscopy here but you are leaving against medical advise. Please follow up with your out patient gastroenterologist for upper endoscopy as soon as possible.  Secondary Diagnosis:	BPH (benign prostatic hyperplasia)  Instructions for follow-up, activity and diet:	please continue taking finasteride for Benign prostatic hyperplasia.  Secondary Diagnosis:	GERD (gastroesophageal reflux disease)  Instructions for follow-up, activity and diet:	Please continue with omperazole for 6 weeks for GERD  Secondary Diagnosis:	Transaminitis

## 2017-09-02 NOTE — DISCHARGE NOTE ADULT - PATIENT PORTAL LINK FT
“You can access the Follow[employer] Patient Portal, offered by [facility], by registering with the following website: [email or web address]”

## 2017-09-02 NOTE — DISCHARGE NOTE ADULT - MEDICATION SUMMARY - MEDICATIONS TO TAKE
I will START or STAY ON the medications listed below when I get home from the hospital:    FINASTERIDE 5 MG TABS  -- Indication: For BPH (benign prostatic hyperplasia)    SIMVASTATIN 40 MG TABS  -- Indication: For Hyperlipedemia    FLUTICASONE PROPIONATE 50 MCG/ACT SUSP  -- Indication: For Bronchodilator    LATANOPROST .005 % SOLN  -- Indication: For Eye drops    LOTEMAX .5 % SUSP  -- Indication: For Eye drops    erythromycin 0.5% ophthalmic ointment  -- 1 application to each affected eye 4 times a day to the left eye  -- Indication: For Eye drops    Ilevro 0.3% ophthalmic suspension  -- 1 drop(s) to each affected eye once a day  -- Indication: For Eye drops    ketorolac 0.5% ophthalmic solution  -- 1 drop(s) to each affected eye once a day  -- Indication: For Eye drops    BRIMONIDINE TARTRATE .2 % SOLN  -- Indication: For Eye drops    DORZOL/TIMOL SOL 22.3-6.8  -- Indication: For Eye drops    OMEPRAZOLE 40 MG CPDR  -- Indication: For GERD (gastroesophageal reflux disease)

## 2017-09-02 NOTE — PROGRESS NOTE ADULT - ASSESSMENT
Spoke with daughter in detail. Patient feels fine and has no abdominal pain. Wishes to have EGD as out patient in [place] when goes with the daughter next week.

## 2017-09-03 LAB
ALBUMIN SERPL ELPH-MCNC: 3 G/DL — LOW (ref 3.5–5)
ALP SERPL-CCNC: 145 U/L — HIGH (ref 40–120)
ALT FLD-CCNC: 34 U/L DA — SIGNIFICANT CHANGE UP (ref 10–60)
ANION GAP SERPL CALC-SCNC: 9 MMOL/L — SIGNIFICANT CHANGE UP (ref 5–17)
AST SERPL-CCNC: 27 U/L — SIGNIFICANT CHANGE UP (ref 10–40)
BILIRUB DIRECT SERPL-MCNC: 0.2 MG/DL — SIGNIFICANT CHANGE UP (ref 0–0.2)
BILIRUB INDIRECT FLD-MCNC: 0.3 MG/DL — SIGNIFICANT CHANGE UP (ref 0.2–1)
BILIRUB SERPL-MCNC: 0.5 MG/DL — SIGNIFICANT CHANGE UP (ref 0.2–1.2)
BUN SERPL-MCNC: 15 MG/DL — SIGNIFICANT CHANGE UP (ref 7–18)
CALCIUM SERPL-MCNC: 9.2 MG/DL — SIGNIFICANT CHANGE UP (ref 8.4–10.5)
CHLORIDE SERPL-SCNC: 105 MMOL/L — SIGNIFICANT CHANGE UP (ref 96–108)
CO2 SERPL-SCNC: 26 MMOL/L — SIGNIFICANT CHANGE UP (ref 22–31)
CREAT SERPL-MCNC: 1.41 MG/DL — HIGH (ref 0.5–1.3)
GLUCOSE SERPL-MCNC: 97 MG/DL — SIGNIFICANT CHANGE UP (ref 70–99)
HCT VFR BLD CALC: 38 % — LOW (ref 39–50)
HGB BLD-MCNC: 12.5 G/DL — LOW (ref 13–17)
MCHC RBC-ENTMCNC: 30.8 PG — SIGNIFICANT CHANGE UP (ref 27–34)
MCHC RBC-ENTMCNC: 33 GM/DL — SIGNIFICANT CHANGE UP (ref 32–36)
MCV RBC AUTO: 93.3 FL — SIGNIFICANT CHANGE UP (ref 80–100)
PLATELET # BLD AUTO: 265 K/UL — SIGNIFICANT CHANGE UP (ref 150–400)
POTASSIUM SERPL-MCNC: 4.2 MMOL/L — SIGNIFICANT CHANGE UP (ref 3.5–5.3)
POTASSIUM SERPL-SCNC: 4.2 MMOL/L — SIGNIFICANT CHANGE UP (ref 3.5–5.3)
PROT SERPL-MCNC: 6.2 G/DL — SIGNIFICANT CHANGE UP (ref 6–8.3)
RBC # BLD: 4.07 M/UL — LOW (ref 4.2–5.8)
RBC # FLD: 11.8 % — SIGNIFICANT CHANGE UP (ref 10.3–14.5)
SODIUM SERPL-SCNC: 140 MMOL/L — SIGNIFICANT CHANGE UP (ref 135–145)
WBC # BLD: 6.2 K/UL — SIGNIFICANT CHANGE UP (ref 3.8–10.5)
WBC # FLD AUTO: 6.2 K/UL — SIGNIFICANT CHANGE UP (ref 3.8–10.5)

## 2017-09-03 RX ORDER — MAGNESIUM HYDROXIDE 400 MG/1
30 TABLET, CHEWABLE ORAL ONCE
Qty: 0 | Refills: 0 | Status: DISCONTINUED | OUTPATIENT
Start: 2017-09-03 | End: 2017-09-04

## 2017-09-03 RX ORDER — KETOROLAC TROMETHAMINE 0.5 %
1 DROPS OPHTHALMIC (EYE)
Qty: 0 | Refills: 0 | COMMUNITY
Start: 2017-09-03

## 2017-09-03 RX ADMIN — Medication 81 MILLIGRAM(S): at 11:54

## 2017-09-03 RX ADMIN — Medication 1 APPLICATION(S): at 17:58

## 2017-09-03 RX ADMIN — PANTOPRAZOLE SODIUM 40 MILLIGRAM(S): 20 TABLET, DELAYED RELEASE ORAL at 05:48

## 2017-09-03 RX ADMIN — ENOXAPARIN SODIUM 30 MILLIGRAM(S): 100 INJECTION SUBCUTANEOUS at 11:54

## 2017-09-03 RX ADMIN — Medication 1 ENEMA: at 16:12

## 2017-09-03 RX ADMIN — DORZOLAMIDE HYDROCHLORIDE TIMOLOL MALEATE 1 DROP(S): 20; 5 SOLUTION/ DROPS OPHTHALMIC at 17:58

## 2017-09-03 RX ADMIN — BRIMONIDINE TARTRATE 1 DROP(S): 2 SOLUTION/ DROPS OPHTHALMIC at 17:58

## 2017-09-03 RX ADMIN — DORZOLAMIDE HYDROCHLORIDE TIMOLOL MALEATE 1 DROP(S): 20; 5 SOLUTION/ DROPS OPHTHALMIC at 05:46

## 2017-09-03 RX ADMIN — Medication 1 DROP(S): at 11:54

## 2017-09-03 RX ADMIN — ATORVASTATIN CALCIUM 40 MILLIGRAM(S): 80 TABLET, FILM COATED ORAL at 21:43

## 2017-09-03 RX ADMIN — Medication 1 APPLICATION(S): at 05:48

## 2017-09-03 RX ADMIN — LATANOPROST 1 DROP(S): 0.05 SOLUTION/ DROPS OPHTHALMIC; TOPICAL at 21:43

## 2017-09-03 RX ADMIN — BRIMONIDINE TARTRATE 1 DROP(S): 2 SOLUTION/ DROPS OPHTHALMIC at 05:48

## 2017-09-03 NOTE — PROGRESS NOTE ADULT - SUBJECTIVE AND OBJECTIVE BOX
PGY 1 Note discussed with supervising resident and primary attending    Patient is a 82y old  Male who presents with a chief complaint of Nausea, vomiting, epigastric discomfort (02 Sep 2017 11:09)      INTERVAL HPI/OVERNIGHT EVENTS: offers no new complaints; current symptoms resolving    MEDICATIONS  (STANDING):  pantoprazole    Tablet 40 milliGRAM(s) Oral before breakfast  atorvastatin 40 milliGRAM(s) Oral at bedtime  enoxaparin Injectable 30 milliGRAM(s) SubCutaneous daily  aspirin  chewable 81 milliGRAM(s) Oral daily  brimonidine 0.2% Ophthalmic Solution 1 Drop(s) Both EYES two times a day  dorzolamide 2%/timolol 0.5% Ophthalmic Solution 1 Drop(s) Both EYES two times a day  latanoprost 0.005% Ophthalmic Solution 1 Drop(s) Both EYES at bedtime  brimonidine 0.2% Ophthalmic Solution 1 Drop(s) Both EYES two times a day  erythromycin   Ointment 1 Application(s) Both EYES two times a day  ketorolac 0.5% Ophthalmic Solution 1 Drop(s) Both EYES daily    MEDICATIONS  (PRN):      __________________________________________________  REVIEW OF SYSTEMS:    CONSTITUTIONAL: No fever,   EYES: no acute visual disturbances  NECK: No pain or stiffness  RESPIRATORY: No cough; No shortness of breath  CARDIOVASCULAR: No chest pain, no palpitations  GASTROINTESTINAL: No pain. No nausea or vomiting; No diarrhea   NEUROLOGICAL: No headache or numbness, no tremors  MUSCULOSKELETAL: No joint pain, no muscle pain  GENITOURINARY: no dysuria, no frequency, no hesitancy  PSYCHIATRY: no depression , no anxiety  ALL OTHER  ROS negative        Vital Signs Last 24 Hrs  T(C): 37.2 (03 Sep 2017 00:00), Max: 37.7 (02 Sep 2017 08:10)  T(F): 98.9 (03 Sep 2017 00:00), Max: 99.8 (02 Sep 2017 08:10)  HR: 65 (03 Sep 2017 00:00) (56 - 84)  BP: 142/59 (03 Sep 2017 00:00) (102/56 - 150/84)  BP(mean): --  RR: 18 (03 Sep 2017 00:00) (16 - 18)  SpO2: 98% (03 Sep 2017 00:00) (96% - 98%)    ________________________________________________  PHYSICAL EXAM:  GENERAL: NAD  HEENT: Normocephalic;  conjunctivae and sclerae clear; moist mucous membranes;   NECK : supple  CHEST/LUNG: Clear to auscultation bilaterally with good air entry   HEART: S1 S2  regular; no murmurs, gallops or rubs  ABDOMEN: Soft, Nontender, Nondistended; Bowel sounds present  EXTREMITIES: no cyanosis; no edema; no calf tenderness  SKIN: warm and dry; no rash  NERVOUS SYSTEM:  Awake and alert; Oriented  to place, person and time ; no new deficits    _________________________________________________  LABS:                        12.6   6.4   )-----------( 250      ( 02 Sep 2017 06:16 )             37.9     09-02    140  |  107  |  14  ----------------------------<  93  3.9   |  25  |  1.43<H>    Ca    9.0      02 Sep 2017 06:16        Urinalysis Basic - ( 01 Sep 2017 07:05 )    Color: Yellow / Appearance: Clear / S.005 / pH: x  Gluc: x / Ketone: Negative  / Bili: Negative / Urobili: Negative   Blood: x / Protein: Negative / Nitrite: Negative   Leuk Esterase: Negative / RBC: x / WBC x   Sq Epi: x / Non Sq Epi: x / Bacteria: x      CAPILLARY BLOOD GLUCOSE            RADIOLOGY & ADDITIONAL TESTS:    Imaging Personally Reviewed:  YES/NO    Consultant(s) Notes Reviewed:   YES/ No    Care Discussed with Consultants :     Plan of care was discussed with patient and /or primary care giver; all questions and concerns were addressed and care was aligned with patient's wishes.

## 2017-09-03 NOTE — PROGRESS NOTE ADULT - PROBLEM SELECTOR PLAN 1
-Epigastric pain radiating to mid chest, with N/V. Most likely due to GERD/ gastritis/ hiatal hernia, unlikely due to ACS, as EKG NSR, no ST- T wave changes, Troponin x 2 negative. will start aspirin  - CT abdomen shows  "Tiny hiatal hernia. 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 Colonic diverticulosis   without diverticulitis."  - Patient has endoscopy scheduled in september, but wants to have endoscopy in this hospital  -Started pantoprazole 40 mg  - GI consult: Dr. Ramos

## 2017-09-04 VITALS
DIASTOLIC BLOOD PRESSURE: 61 MMHG | OXYGEN SATURATION: 99 % | TEMPERATURE: 98 F | SYSTOLIC BLOOD PRESSURE: 114 MMHG | HEART RATE: 54 BPM | RESPIRATION RATE: 17 BRPM

## 2017-09-04 LAB
ANION GAP SERPL CALC-SCNC: 10 MMOL/L — SIGNIFICANT CHANGE UP (ref 5–17)
BUN SERPL-MCNC: 17 MG/DL — SIGNIFICANT CHANGE UP (ref 7–18)
CALCIUM SERPL-MCNC: 9.4 MG/DL — SIGNIFICANT CHANGE UP (ref 8.4–10.5)
CHLORIDE SERPL-SCNC: 104 MMOL/L — SIGNIFICANT CHANGE UP (ref 96–108)
CO2 SERPL-SCNC: 25 MMOL/L — SIGNIFICANT CHANGE UP (ref 22–31)
CREAT SERPL-MCNC: 1.41 MG/DL — HIGH (ref 0.5–1.3)
GLUCOSE SERPL-MCNC: 93 MG/DL — SIGNIFICANT CHANGE UP (ref 70–99)
HCT VFR BLD CALC: 38.7 % — LOW (ref 39–50)
HGB BLD-MCNC: 13 G/DL — SIGNIFICANT CHANGE UP (ref 13–17)
MCHC RBC-ENTMCNC: 31.8 PG — SIGNIFICANT CHANGE UP (ref 27–34)
MCHC RBC-ENTMCNC: 33.7 GM/DL — SIGNIFICANT CHANGE UP (ref 32–36)
MCV RBC AUTO: 94.4 FL — SIGNIFICANT CHANGE UP (ref 80–100)
PLATELET # BLD AUTO: 277 K/UL — SIGNIFICANT CHANGE UP (ref 150–400)
POTASSIUM SERPL-MCNC: 4.2 MMOL/L — SIGNIFICANT CHANGE UP (ref 3.5–5.3)
POTASSIUM SERPL-SCNC: 4.2 MMOL/L — SIGNIFICANT CHANGE UP (ref 3.5–5.3)
RBC # BLD: 4.1 M/UL — LOW (ref 4.2–5.8)
RBC # FLD: 11.8 % — SIGNIFICANT CHANGE UP (ref 10.3–14.5)
SODIUM SERPL-SCNC: 139 MMOL/L — SIGNIFICANT CHANGE UP (ref 135–145)
WBC # BLD: 5.4 K/UL — SIGNIFICANT CHANGE UP (ref 3.8–10.5)
WBC # FLD AUTO: 5.4 K/UL — SIGNIFICANT CHANGE UP (ref 3.8–10.5)

## 2017-09-04 RX ADMIN — Medication 81 MILLIGRAM(S): at 12:30

## 2017-09-04 RX ADMIN — BRIMONIDINE TARTRATE 1 DROP(S): 2 SOLUTION/ DROPS OPHTHALMIC at 06:31

## 2017-09-04 RX ADMIN — Medication 1 APPLICATION(S): at 06:32

## 2017-09-04 RX ADMIN — ENOXAPARIN SODIUM 30 MILLIGRAM(S): 100 INJECTION SUBCUTANEOUS at 12:30

## 2017-09-04 RX ADMIN — Medication 1 DROP(S): at 12:30

## 2017-09-04 RX ADMIN — DORZOLAMIDE HYDROCHLORIDE TIMOLOL MALEATE 1 DROP(S): 20; 5 SOLUTION/ DROPS OPHTHALMIC at 06:32

## 2017-09-04 RX ADMIN — PANTOPRAZOLE SODIUM 40 MILLIGRAM(S): 20 TABLET, DELAYED RELEASE ORAL at 06:32

## 2017-09-04 NOTE — PROGRESS NOTE ADULT - SUBJECTIVE AND OBJECTIVE BOX
[   ] ICU                  [   ] CCU                [  X ] Medical Floor    Patient is comfortable. No new complaints reported, No N/V, hematemesis, hematochezia, melena, fever, chills, chest pain, SOB, cough or diarrhea reported.    VITALS  Vital Signs Last 24 Hrs  T(C): 36.8 (04 Sep 2017 12:08), Max: 37.1 (03 Sep 2017 19:23)  T(F): 98.3 (04 Sep 2017 12:08), Max: 98.8 (04 Sep 2017 07:54)  HR: 54 (04 Sep 2017 12:08) (54 - 62)  BP: 114/61 (04 Sep 2017 12:08) (103/51 - 124/65)  BP(mean): --  RR: 17 (04 Sep 2017 12:08) (17 - 18)  SpO2: 99% (04 Sep 2017 12:08) (96% - 99%)         MEDICATIONS  (STANDING):  pantoprazole    Tablet 40 milliGRAM(s) Oral before breakfast  atorvastatin 40 milliGRAM(s) Oral at bedtime  enoxaparin Injectable 30 milliGRAM(s) SubCutaneous daily  aspirin  chewable 81 milliGRAM(s) Oral daily  brimonidine 0.2% Ophthalmic Solution 1 Drop(s) Both EYES two times a day  dorzolamide 2%/timolol 0.5% Ophthalmic Solution 1 Drop(s) Both EYES two times a day  latanoprost 0.005% Ophthalmic Solution 1 Drop(s) Both EYES at bedtime  brimonidine 0.2% Ophthalmic Solution 1 Drop(s) Both EYES two times a day  erythromycin   Ointment 1 Application(s) Both EYES two times a day  ketorolac 0.5% Ophthalmic Solution 1 Drop(s) Both EYES daily    MEDICATIONS  (PRN):  magnesium hydroxide Suspension 30 milliLiter(s) Oral once PRN Constipation                            13.0   5.4   )-----------( 277      ( 04 Sep 2017 06:11 )             38.7       09-04    139  |  104  |  17  ----------------------------<  93  4.2   |  25  |  1.41<H>    Ca    9.4      04 Sep 2017 06:11    TPro  6.2  /  Alb  3.0<L>  /  TBili  0.5  /  DBili  0.2  /  AST  27  /  ALT  34  /  AlkPhos  145<H>  09-03 [   ] ICU                  [   ] CCU                [  X ] Medical Floor (Patient seen and examined at 10:13 am)    Patient is comfortable. No new complaints reported, No N/V, hematemesis, hematochezia, melena, fever, chills, chest pain, SOB, cough or diarrhea reported.    VITALS  Vital Signs Last 24 Hrs  T(C): 36.8 (04 Sep 2017 12:08), Max: 37.1 (03 Sep 2017 19:23)  T(F): 98.3 (04 Sep 2017 12:08), Max: 98.8 (04 Sep 2017 07:54)  HR: 54 (04 Sep 2017 12:08) (54 - 62)  BP: 114/61 (04 Sep 2017 12:08) (103/51 - 124/65)  BP(mean): --  RR: 17 (04 Sep 2017 12:08) (17 - 18)  SpO2: 99% (04 Sep 2017 12:08) (96% - 99%)         MEDICATIONS  (STANDING):  pantoprazole    Tablet 40 milliGRAM(s) Oral before breakfast  atorvastatin 40 milliGRAM(s) Oral at bedtime  enoxaparin Injectable 30 milliGRAM(s) SubCutaneous daily  aspirin  chewable 81 milliGRAM(s) Oral daily  brimonidine 0.2% Ophthalmic Solution 1 Drop(s) Both EYES two times a day  dorzolamide 2%/timolol 0.5% Ophthalmic Solution 1 Drop(s) Both EYES two times a day  latanoprost 0.005% Ophthalmic Solution 1 Drop(s) Both EYES at bedtime  brimonidine 0.2% Ophthalmic Solution 1 Drop(s) Both EYES two times a day  erythromycin   Ointment 1 Application(s) Both EYES two times a day  ketorolac 0.5% Ophthalmic Solution 1 Drop(s) Both EYES daily    MEDICATIONS  (PRN):  magnesium hydroxide Suspension 30 milliLiter(s) Oral once PRN Constipation                            13.0   5.4   )-----------( 277      ( 04 Sep 2017 06:11 )             38.7       09-04    139  |  104  |  17  ----------------------------<  93  4.2   |  25  |  1.41<H>    Ca    9.4      04 Sep 2017 06:11    TPro  6.2  /  Alb  3.0<L>  /  TBili  0.5  /  DBili  0.2  /  AST  27  /  ALT  34  /  AlkPhos  145<H>  09-03

## 2017-09-04 NOTE — PROGRESS NOTE ADULT - ASSESSMENT
Spoke with daughter in detail. Patient feels fine and has no abdominal pain. Wishes to have EGD as out patient in [place] when goes with the daughter next week.

## 2017-09-04 NOTE — PROGRESS NOTE ADULT - SUBJECTIVE AND OBJECTIVE BOX
PGY 1 Note discussed with supervising resident and primary attending    Patient is a 82y old  Male who presents with a chief complaint of Nausea, vomiting, epigastric discomfort (02 Sep 2017 11:09)      INTERVAL HPI/OVERNIGHT EVENTS: Patient was seen and examined at bed side. Patient will get an endoscopy here as Dr Ramos. High risk for him to fly to [place] if there will be a bleeding ulcer. will get endoscopy here.     MEDICATIONS  (STANDING):  pantoprazole    Tablet 40 milliGRAM(s) Oral before breakfast  atorvastatin 40 milliGRAM(s) Oral at bedtime  enoxaparin Injectable 30 milliGRAM(s) SubCutaneous daily  aspirin  chewable 81 milliGRAM(s) Oral daily  brimonidine 0.2% Ophthalmic Solution 1 Drop(s) Both EYES two times a day  dorzolamide 2%/timolol 0.5% Ophthalmic Solution 1 Drop(s) Both EYES two times a day  latanoprost 0.005% Ophthalmic Solution 1 Drop(s) Both EYES at bedtime  brimonidine 0.2% Ophthalmic Solution 1 Drop(s) Both EYES two times a day  erythromycin   Ointment 1 Application(s) Both EYES two times a day  ketorolac 0.5% Ophthalmic Solution 1 Drop(s) Both EYES daily    MEDICATIONS  (PRN):  magnesium hydroxide Suspension 30 milliLiter(s) Oral once PRN Constipation      __________________________________________________  REVIEW OF SYSTEMS:    CONSTITUTIONAL: No fever,   EYES: no acute visual disturbances  NECK: No pain or stiffness  RESPIRATORY: No cough; No shortness of breath  CARDIOVASCULAR: No chest pain, no palpitations  GASTROINTESTINAL: No pain. No nausea or vomiting; No diarrhea   NEUROLOGICAL: No headache or numbness, no tremors  MUSCULOSKELETAL: No joint pain, no muscle pain  GENITOURINARY: no dysuria, no frequency, no hesitancy  PSYCHIATRY: no depression , no anxiety  ALL OTHER  ROS negative        Vital Signs Last 24 Hrs  T(C): 36.8 (04 Sep 2017 12:08), Max: 37.1 (03 Sep 2017 19:23)  T(F): 98.3 (04 Sep 2017 12:08), Max: 98.8 (04 Sep 2017 07:54)  HR: 54 (04 Sep 2017 12:08) (54 - 62)  BP: 114/61 (04 Sep 2017 12:08) (103/51 - 124/65)  BP(mean): --  RR: 17 (04 Sep 2017 12:08) (17 - 18)  SpO2: 99% (04 Sep 2017 12:08) (96% - 99%)    ________________________________________________  PHYSICAL EXAM:  GENERAL: NAD  HEENT: Normocephalic;  conjunctivae and sclerae clear; moist mucous membranes;   NECK : supple  CHEST/LUNG: Clear to auscultation bilaterally with good air entry   HEART: S1 S2  regular; no murmurs, gallops or rubs  ABDOMEN: Soft, Nontender, Nondistended; Bowel sounds present  EXTREMITIES: no cyanosis; no edema; no calf tenderness  SKIN: warm and dry; no rash  NERVOUS SYSTEM:  Awake and alert; Oriented  to place, person and time ; no new deficits    _________________________________________________  LABS:                        13.0   5.4   )-----------( 277      ( 04 Sep 2017 06:11 )             38.7     09-04    139  |  104  |  17  ----------------------------<  93  4.2   |  25  |  1.41<H>    Ca    9.4      04 Sep 2017 06:11    TPro  6.2  /  Alb  3.0<L>  /  TBili  0.5  /  DBili  0.2  /  AST  27  /  ALT  34  /  AlkPhos  145<H>  09-03        CAPILLARY BLOOD GLUCOSE            RADIOLOGY & ADDITIONAL TESTS:    Imaging Personally Reviewed:  YES/NO    Consultant(s) Notes Reviewed:   YES/ No    Care Discussed with Consultants :     Plan of care was discussed with patient and /or primary care giver; all questions and concerns were addressed and care was aligned with patient's wishes.

## 2017-09-04 NOTE — PROGRESS NOTE ADULT - RS GEN PE MLT RESP DETAILS PC
no wheezes/good air movement/breath sounds equal/no chest wall tenderness/no rhonchi/clear to auscultation bilaterally/respirations non-labored/airway patent

## 2017-09-04 NOTE — PROGRESS NOTE ADULT - PROBLEM SELECTOR PLAN 1
-Epigastric pain radiating to mid chest, with N/V. Most likely due to GERD/ gastritis/ hiatal hernia,  - CT abdomen shows  "Tiny hiatal hernia. 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 Colonic diverticulosis   without diverticulitis."  - Patient has endoscopy scheduled in september, but wants to have endoscopy in this hospital  -Started pantoprazole 40 mg  - GI consult: Dr. Ramos  EGD to evaluate cause of epigastric pain.

## 2017-09-04 NOTE — PROGRESS NOTE ADULT - ASSESSMENT
1. Abdominal pain  2. R/o Peptic ulcer disease  3. R/o Esophagitis  4. R/o Gastroparesis  5. R/o Biliary colic (LFT's improving)    Suggestions:    1. Protonix daily  2. EGD  3. Anti reflux measure  4. Avoid NSAID's  5. DVT prophylaxis

## 2017-09-05 LAB
ANA TITR SER: NEGATIVE — SIGNIFICANT CHANGE UP
SMOOTH MUSCLE AB SER-ACNC: ABNORMAL

## 2017-09-06 DIAGNOSIS — K21.9 GASTRO-ESOPHAGEAL REFLUX DISEASE WITHOUT ESOPHAGITIS: ICD-10-CM

## 2017-09-06 DIAGNOSIS — Z90.49 ACQUIRED ABSENCE OF OTHER SPECIFIED PARTS OF DIGESTIVE TRACT: ICD-10-CM

## 2017-09-06 DIAGNOSIS — R74.0 NONSPECIFIC ELEVATION OF LEVELS OF TRANSAMINASE AND LACTIC ACID DEHYDROGENASE [LDH]: ICD-10-CM

## 2017-09-06 DIAGNOSIS — E78.5 HYPERLIPIDEMIA, UNSPECIFIED: ICD-10-CM

## 2017-09-06 DIAGNOSIS — K44.9 DIAPHRAGMATIC HERNIA WITHOUT OBSTRUCTION OR GANGRENE: ICD-10-CM

## 2017-09-06 DIAGNOSIS — K57.90 DIVERTICULOSIS OF INTESTINE, PART UNSPECIFIED, WITHOUT PERFORATION OR ABSCESS WITHOUT BLEEDING: ICD-10-CM

## 2017-09-06 DIAGNOSIS — N40.0 BENIGN PROSTATIC HYPERPLASIA WITHOUT LOWER URINARY TRACT SYMPTOMS: ICD-10-CM

## 2017-09-06 DIAGNOSIS — Z53.21 PROCEDURE AND TREATMENT NOT CARRIED OUT DUE TO PATIENT LEAVING PRIOR TO BEING SEEN BY HEALTH CARE PROVIDER: ICD-10-CM

## 2017-09-06 DIAGNOSIS — R10.13 EPIGASTRIC PAIN: ICD-10-CM

## 2017-09-27 ENCOUNTER — LAB VISIT (OUTPATIENT)
Dept: LAB | Facility: OTHER | Age: 82
End: 2017-09-27
Attending: INTERNAL MEDICINE
Payer: MEDICARE

## 2017-09-27 DIAGNOSIS — R94.5 ABNORMAL RESULTS OF LIVER FUNCTION STUDIES: ICD-10-CM

## 2017-09-27 DIAGNOSIS — R11.0 NAUSEA ALONE: Primary | ICD-10-CM

## 2017-09-27 LAB
ALBUMIN SERPL BCP-MCNC: 3.7 G/DL
ALP SERPL-CCNC: 211 U/L
ALT SERPL W/O P-5'-P-CCNC: 78 U/L
AST SERPL-CCNC: 36 U/L
BILIRUB DIRECT SERPL-MCNC: 0.4 MG/DL
BILIRUB SERPL-MCNC: 0.7 MG/DL
PROT SERPL-MCNC: 7.4 G/DL

## 2017-09-27 PROCEDURE — 80048 BASIC METABOLIC PNL TOTAL CA: CPT

## 2017-09-27 PROCEDURE — 80076 HEPATIC FUNCTION PANEL: CPT

## 2017-09-27 PROCEDURE — 36415 COLL VENOUS BLD VENIPUNCTURE: CPT

## 2017-10-10 ENCOUNTER — HOSPITAL ENCOUNTER (OUTPATIENT)
Dept: RADIOLOGY | Facility: OTHER | Age: 82
Discharge: HOME | End: 2017-10-10
Attending: INTERNAL MEDICINE
Payer: MEDICARE

## 2017-10-10 DIAGNOSIS — R94.5 LIVER FUNCTION STUDY, ABNORMAL: ICD-10-CM

## 2017-10-10 DIAGNOSIS — R11.0 NAUSEA ALONE: ICD-10-CM

## 2017-10-10 LAB
ANION GAP SERPL CALC-SCNC: 9 MMOL/L
BUN SERPL-MCNC: 19 MG/DL
CALCIUM SERPL-MCNC: 9.9 MG/DL
CHLORIDE SERPL-SCNC: 103 MMOL/L
CO2 SERPL-SCNC: 23 MMOL/L
CREAT SERPL-MCNC: 1.6 MG/DL
EST. GFR  (AFRICAN AMERICAN): 46 ML/MIN/1.73 M^2
EST. GFR  (NON AFRICAN AMERICAN): 40 ML/MIN/1.73 M^2
GLUCOSE SERPL-MCNC: 119 MG/DL
POTASSIUM SERPL-SCNC: 4.3 MMOL/L
SODIUM SERPL-SCNC: 135 MMOL/L

## 2017-10-10 PROCEDURE — 76376 3D RENDER W/INTRP POSTPROCES: CPT | Mod: 26,,, | Performed by: RADIOLOGY

## 2017-10-10 PROCEDURE — 74181 MRI ABDOMEN W/O CONTRAST: CPT | Mod: 26,,, | Performed by: RADIOLOGY

## 2017-10-10 PROCEDURE — 70450 CT HEAD/BRAIN W/O DYE: CPT | Mod: 26,,, | Performed by: RADIOLOGY

## 2017-10-10 PROCEDURE — 93976 VASCULAR STUDY: CPT | Mod: 26,,, | Performed by: RADIOLOGY

## 2017-10-10 PROCEDURE — 76775 US EXAM ABDO BACK WALL LIM: CPT | Mod: 26,59,, | Performed by: RADIOLOGY

## 2017-10-10 PROCEDURE — 70450 CT HEAD/BRAIN W/O DYE: CPT | Mod: TC

## 2017-10-10 PROCEDURE — 74181 MRI ABDOMEN W/O CONTRAST: CPT | Mod: TC

## 2017-10-10 PROCEDURE — 93976 VASCULAR STUDY: CPT | Mod: TC

## 2017-10-19 PROCEDURE — 83605 ASSAY OF LACTIC ACID: CPT

## 2017-10-19 PROCEDURE — 96374 THER/PROPH/DIAG INJ IV PUSH: CPT | Mod: XU

## 2017-10-19 PROCEDURE — 83690 ASSAY OF LIPASE: CPT

## 2017-10-19 PROCEDURE — 85027 COMPLETE CBC AUTOMATED: CPT

## 2017-10-19 PROCEDURE — 86038 ANTINUCLEAR ANTIBODIES: CPT

## 2017-10-19 PROCEDURE — 80048 BASIC METABOLIC PNL TOTAL CA: CPT

## 2017-10-19 PROCEDURE — 93306 TTE W/DOPPLER COMPLETE: CPT

## 2017-10-19 PROCEDURE — 82553 CREATINE MB FRACTION: CPT

## 2017-10-19 PROCEDURE — 96375 TX/PRO/DX INJ NEW DRUG ADDON: CPT

## 2017-10-19 PROCEDURE — 82977 ASSAY OF GGT: CPT

## 2017-10-19 PROCEDURE — 93005 ELECTROCARDIOGRAM TRACING: CPT

## 2017-10-19 PROCEDURE — 81001 URINALYSIS AUTO W/SCOPE: CPT

## 2017-10-19 PROCEDURE — 86255 FLUORESCENT ANTIBODY SCREEN: CPT

## 2017-10-19 PROCEDURE — 82550 ASSAY OF CK (CPK): CPT

## 2017-10-19 PROCEDURE — 80053 COMPREHEN METABOLIC PANEL: CPT

## 2017-10-19 PROCEDURE — 80076 HEPATIC FUNCTION PANEL: CPT

## 2017-10-19 PROCEDURE — 74177 CT ABD & PELVIS W/CONTRAST: CPT

## 2017-10-19 PROCEDURE — 71046 X-RAY EXAM CHEST 2 VIEWS: CPT

## 2017-10-19 PROCEDURE — 99285 EMERGENCY DEPT VISIT HI MDM: CPT | Mod: 25

## 2017-10-19 PROCEDURE — 80061 LIPID PANEL: CPT

## 2017-10-19 PROCEDURE — 80074 ACUTE HEPATITIS PANEL: CPT

## 2017-10-19 PROCEDURE — 84484 ASSAY OF TROPONIN QUANT: CPT

## 2017-10-24 ENCOUNTER — LAB VISIT (OUTPATIENT)
Dept: LAB | Facility: OTHER | Age: 82
End: 2017-10-24
Attending: INTERNAL MEDICINE
Payer: MEDICARE

## 2017-10-24 DIAGNOSIS — E64.9 SEQUELA OF NUTRITIONAL DEFICIENCY: ICD-10-CM

## 2017-10-24 DIAGNOSIS — E55.9 VITAMIN D DEFICIENCY DISEASE: ICD-10-CM

## 2017-10-24 DIAGNOSIS — Z79.899 ENCOUNTER FOR LONG-TERM (CURRENT) USE OF MEDICATIONS: ICD-10-CM

## 2017-10-24 DIAGNOSIS — N13.30 HYDRONEPHROSIS: ICD-10-CM

## 2017-10-24 DIAGNOSIS — Q60.0 UNILATERAL AGENESIS OF KIDNEY: ICD-10-CM

## 2017-10-24 DIAGNOSIS — E03.9 PRIMARY HYPOTHYROIDISM: ICD-10-CM

## 2017-10-24 DIAGNOSIS — E86.0 DEHYDRATION: Primary | ICD-10-CM

## 2017-10-24 DIAGNOSIS — E88.09 PROTEIN, SERUM, DECREASED LEVEL: ICD-10-CM

## 2017-10-24 DIAGNOSIS — N40.0 BENIGN ENLARGEMENT OF PROSTATE: ICD-10-CM

## 2017-10-24 DIAGNOSIS — R11.2 PROLONGED SEVERE NAUSEA AND VOMITING: ICD-10-CM

## 2017-10-24 DIAGNOSIS — K21.9 ESOPHAGEAL REFLUX: ICD-10-CM

## 2017-10-24 LAB
25(OH)D3+25(OH)D2 SERPL-MCNC: 38 NG/ML
ANION GAP SERPL CALC-SCNC: 6 MMOL/L
BASOPHILS # BLD AUTO: 0.05 K/UL
BASOPHILS NFR BLD: 0.7 %
BILIRUB UR QL STRIP: NEGATIVE
BUN SERPL-MCNC: 12 MG/DL
CALCIUM SERPL-MCNC: 9.1 MG/DL
CHLORIDE SERPL-SCNC: 106 MMOL/L
CLARITY UR: CLEAR
CO2 SERPL-SCNC: 25 MMOL/L
COLOR UR: YELLOW
CREAT SERPL-MCNC: 1.5 MG/DL
CREAT UR-MCNC: 31.8 MG/DL
DIFFERENTIAL METHOD: ABNORMAL
EOSINOPHIL # BLD AUTO: 0.4 K/UL
EOSINOPHIL NFR BLD: 5.4 %
ERYTHROCYTE [DISTWIDTH] IN BLOOD BY AUTOMATED COUNT: 13 %
EST. GFR  (AFRICAN AMERICAN): 49 ML/MIN/1.73 M^2
EST. GFR  (NON AFRICAN AMERICAN): 43 ML/MIN/1.73 M^2
GLUCOSE SERPL-MCNC: 93 MG/DL
GLUCOSE UR QL STRIP: NEGATIVE
HCT VFR BLD AUTO: 40.1 %
HGB BLD-MCNC: 13.1 G/DL
HGB UR QL STRIP: NEGATIVE
KETONES UR QL STRIP: NEGATIVE
LEUKOCYTE ESTERASE UR QL STRIP: NEGATIVE
LYMPHOCYTES # BLD AUTO: 1.5 K/UL
LYMPHOCYTES NFR BLD: 21.2 %
MAGNESIUM SERPL-MCNC: 1.7 MG/DL
MCH RBC QN AUTO: 30.3 PG
MCHC RBC AUTO-ENTMCNC: 32.7 G/DL
MCV RBC AUTO: 93 FL
MONOCYTES # BLD AUTO: 1 K/UL
MONOCYTES NFR BLD: 14.9 %
NEUTROPHILS # BLD AUTO: 3.9 K/UL
NEUTROPHILS NFR BLD: 56.8 %
NITRITE UR QL STRIP: NEGATIVE
PH UR STRIP: 7 [PH] (ref 5–8)
PHOSPHATE SERPL-MCNC: 3.6 MG/DL
PLATELET # BLD AUTO: 283 K/UL
PMV BLD AUTO: 10.2 FL
POTASSIUM SERPL-SCNC: 4.4 MMOL/L
PROT UR QL STRIP: NEGATIVE
PROT UR-MCNC: <7 MG/DL
PROT/CREAT RATIO, UR: NORMAL
PTH-INTACT SERPL-MCNC: 43.6 PG/ML
RBC # BLD AUTO: 4.32 M/UL
SODIUM SERPL-SCNC: 137 MMOL/L
SP GR UR STRIP: <=1.005 (ref 1–1.03)
T4 FREE SERPL-MCNC: 0.94 NG/DL
TSH SERPL DL<=0.005 MIU/L-ACNC: 0.87 UIU/ML
URATE SERPL-MCNC: 5.4 MG/DL
URN SPEC COLLECT METH UR: ABNORMAL
UROBILINOGEN UR STRIP-ACNC: NEGATIVE EU/DL
WBC # BLD AUTO: 6.85 K/UL

## 2017-10-24 PROCEDURE — 84439 ASSAY OF FREE THYROXINE: CPT

## 2017-10-24 PROCEDURE — 80048 BASIC METABOLIC PNL TOTAL CA: CPT

## 2017-10-24 PROCEDURE — 36415 COLL VENOUS BLD VENIPUNCTURE: CPT

## 2017-10-24 PROCEDURE — 83735 ASSAY OF MAGNESIUM: CPT

## 2017-10-24 PROCEDURE — 82570 ASSAY OF URINE CREATININE: CPT

## 2017-10-24 PROCEDURE — 82306 VITAMIN D 25 HYDROXY: CPT

## 2017-10-24 PROCEDURE — 83970 ASSAY OF PARATHORMONE: CPT

## 2017-10-24 PROCEDURE — 81003 URINALYSIS AUTO W/O SCOPE: CPT

## 2017-10-24 PROCEDURE — 84100 ASSAY OF PHOSPHORUS: CPT

## 2017-10-24 PROCEDURE — 84443 ASSAY THYROID STIM HORMONE: CPT

## 2017-10-24 PROCEDURE — 85025 COMPLETE CBC W/AUTO DIFF WBC: CPT

## 2017-10-24 PROCEDURE — 84550 ASSAY OF BLOOD/URIC ACID: CPT

## 2017-11-22 ENCOUNTER — HOSPITAL ENCOUNTER (OUTPATIENT)
Dept: RADIOLOGY | Facility: OTHER | Age: 82
Discharge: HOME | End: 2017-11-22
Attending: INTERNAL MEDICINE
Payer: MEDICARE

## 2017-11-22 DIAGNOSIS — R10.9 STOMACH ACHE: Primary | ICD-10-CM

## 2017-11-22 DIAGNOSIS — R11.0 NAUSEA: ICD-10-CM

## 2017-11-22 DIAGNOSIS — R10.9 STOMACH ACHE: ICD-10-CM

## 2017-11-22 PROCEDURE — 74000 XR KUB: CPT | Mod: 26,,, | Performed by: RADIOLOGY

## 2017-11-22 PROCEDURE — 74000 XR KUB: CPT | Mod: TC

## 2018-02-23 ENCOUNTER — LAB VISIT (OUTPATIENT)
Dept: LAB | Facility: OTHER | Age: 83
End: 2018-02-23
Payer: MEDICARE

## 2018-02-23 DIAGNOSIS — E55.9 VITAMIN D DEFICIENCY DISEASE: ICD-10-CM

## 2018-02-23 DIAGNOSIS — E78.5 HYPERLIPEMIA: ICD-10-CM

## 2018-02-23 DIAGNOSIS — N18.9 CHRONIC KIDNEY DISEASE, UNSPECIFIED: Primary | ICD-10-CM

## 2018-02-23 DIAGNOSIS — I10 ESSENTIAL HYPERTENSION, MALIGNANT: ICD-10-CM

## 2018-02-23 LAB
25(OH)D3+25(OH)D2 SERPL-MCNC: 40 NG/ML
ALBUMIN SERPL BCP-MCNC: 3.6 G/DL
ALP SERPL-CCNC: 74 U/L
ALT SERPL W/O P-5'-P-CCNC: 10 U/L
ANION GAP SERPL CALC-SCNC: 12 MMOL/L
AST SERPL-CCNC: 19 U/L
BASOPHILS # BLD AUTO: 0.03 K/UL
BASOPHILS NFR BLD: 0.6 %
BILIRUB DIRECT SERPL-MCNC: 0.2 MG/DL
BILIRUB SERPL-MCNC: 0.4 MG/DL
BILIRUB UR QL STRIP: NEGATIVE
BUN SERPL-MCNC: 22 MG/DL
CALCIUM SERPL-MCNC: 9.3 MG/DL
CHLORIDE SERPL-SCNC: 107 MMOL/L
CHOLEST SERPL-MCNC: 208 MG/DL
CHOLEST/HDLC SERPL: 4 {RATIO}
CLARITY UR: CLEAR
CO2 SERPL-SCNC: 19 MMOL/L
COLOR UR: YELLOW
CREAT SERPL-MCNC: 1.7 MG/DL
CREAT UR-MCNC: 140.3 MG/DL
DIFFERENTIAL METHOD: ABNORMAL
EOSINOPHIL # BLD AUTO: 0.2 K/UL
EOSINOPHIL NFR BLD: 3.7 %
ERYTHROCYTE [DISTWIDTH] IN BLOOD BY AUTOMATED COUNT: 14.3 %
EST. GFR  (AFRICAN AMERICAN): 42 ML/MIN/1.73 M^2
EST. GFR  (NON AFRICAN AMERICAN): 37 ML/MIN/1.73 M^2
GLUCOSE SERPL-MCNC: 131 MG/DL
GLUCOSE UR QL STRIP: NEGATIVE
HCT VFR BLD AUTO: 40.9 %
HDLC SERPL-MCNC: 52 MG/DL
HDLC SERPL: 25 %
HGB BLD-MCNC: 13.4 G/DL
HGB UR QL STRIP: NEGATIVE
KETONES UR QL STRIP: NEGATIVE
LDLC SERPL CALC-MCNC: 136 MG/DL
LEUKOCYTE ESTERASE UR QL STRIP: NEGATIVE
LYMPHOCYTES # BLD AUTO: 0.9 K/UL
LYMPHOCYTES NFR BLD: 18.2 %
MAGNESIUM SERPL-MCNC: 2 MG/DL
MCH RBC QN AUTO: 30.6 PG
MCHC RBC AUTO-ENTMCNC: 32.8 G/DL
MCV RBC AUTO: 93 FL
MONOCYTES # BLD AUTO: 0.6 K/UL
MONOCYTES NFR BLD: 12.8 %
NEUTROPHILS # BLD AUTO: 3.1 K/UL
NEUTROPHILS NFR BLD: 63.9 %
NITRITE UR QL STRIP: NEGATIVE
NONHDLC SERPL-MCNC: 156 MG/DL
PH UR STRIP: 6 [PH] (ref 5–8)
PHOSPHATE SERPL-MCNC: 3.7 MG/DL
PLATELET # BLD AUTO: 260 K/UL
PMV BLD AUTO: 9.9 FL
POTASSIUM SERPL-SCNC: 4.4 MMOL/L
PROT SERPL-MCNC: 6.7 G/DL
PROT UR QL STRIP: NEGATIVE
PROT UR-MCNC: 21 MG/DL
PROT/CREAT RATIO, UR: 0.15
PTH-INTACT SERPL-MCNC: 48.9 PG/ML
RBC # BLD AUTO: 4.38 M/UL
SODIUM SERPL-SCNC: 138 MMOL/L
SP GR UR STRIP: 1.01 (ref 1–1.03)
TRIGL SERPL-MCNC: 100 MG/DL
URN SPEC COLLECT METH UR: NORMAL
UROBILINOGEN UR STRIP-ACNC: NEGATIVE EU/DL
WBC # BLD AUTO: 4.83 K/UL

## 2018-02-23 PROCEDURE — 80048 BASIC METABOLIC PNL TOTAL CA: CPT

## 2018-02-23 PROCEDURE — 82570 ASSAY OF URINE CREATININE: CPT

## 2018-02-23 PROCEDURE — 81003 URINALYSIS AUTO W/O SCOPE: CPT

## 2018-02-23 PROCEDURE — 83970 ASSAY OF PARATHORMONE: CPT

## 2018-02-23 PROCEDURE — 82306 VITAMIN D 25 HYDROXY: CPT

## 2018-02-23 PROCEDURE — 83735 ASSAY OF MAGNESIUM: CPT

## 2018-02-23 PROCEDURE — 80076 HEPATIC FUNCTION PANEL: CPT

## 2018-02-23 PROCEDURE — 80061 LIPID PANEL: CPT

## 2018-02-23 PROCEDURE — 36415 COLL VENOUS BLD VENIPUNCTURE: CPT

## 2018-02-23 PROCEDURE — 84100 ASSAY OF PHOSPHORUS: CPT

## 2018-02-23 PROCEDURE — 85025 COMPLETE CBC W/AUTO DIFF WBC: CPT

## 2018-07-06 NOTE — ED ADULT NURSE NOTE - NS ED NOTE  TALK SOMEONE YN
CLINICAL PHARMACY NOTE: MEDS TO [address] Select Patient?: No  Total # of Prescriptions Filled: 1   The following medications were delivered to the patient:  · Norco  Total # of Interventions Completed: 3  Time Spent (min): 30    Additional Documentation: No

## 2018-09-10 ENCOUNTER — TRANSCRIPTION ENCOUNTER (OUTPATIENT)
Age: 83
End: 2018-09-10

## 2018-09-17 ENCOUNTER — INPATIENT (INPATIENT)
Facility: HOSPITAL | Age: 83
LOS: 4 days | Discharge: HOME | DRG: 71 | End: 2018-09-22
Attending: INTERNAL MEDICINE | Admitting: INTERNAL MEDICINE
Payer: MEDICARE

## 2018-09-17 VITALS
OXYGEN SATURATION: 94 % | HEART RATE: 74 BPM | SYSTOLIC BLOOD PRESSURE: 113 MMHG | TEMPERATURE: 97 F | DIASTOLIC BLOOD PRESSURE: 76 MMHG | RESPIRATION RATE: 16 BRPM

## 2018-09-17 DIAGNOSIS — E87.1 HYPO-OSMOLALITY AND HYPONATREMIA: ICD-10-CM

## 2018-09-17 DIAGNOSIS — E78.5 HYPERLIPIDEMIA, UNSPECIFIED: ICD-10-CM

## 2018-09-17 DIAGNOSIS — G93.41 METABOLIC ENCEPHALOPATHY: ICD-10-CM

## 2018-09-17 DIAGNOSIS — R45.1 RESTLESSNESS AND AGITATION: ICD-10-CM

## 2018-09-17 DIAGNOSIS — F03.91 UNSPECIFIED DEMENTIA WITH BEHAVIORAL DISTURBANCE: ICD-10-CM

## 2018-09-17 DIAGNOSIS — N17.9 ACUTE KIDNEY FAILURE, UNSPECIFIED: ICD-10-CM

## 2018-09-17 DIAGNOSIS — R29.6 REPEATED FALLS: ICD-10-CM

## 2018-09-17 DIAGNOSIS — R32 UNSPECIFIED URINARY INCONTINENCE: ICD-10-CM

## 2018-09-17 DIAGNOSIS — Q60.0 RENAL AGENESIS, UNILATERAL: ICD-10-CM

## 2018-09-17 DIAGNOSIS — Z87.440 PERSONAL HISTORY OF URINARY (TRACT) INFECTIONS: ICD-10-CM

## 2018-09-17 DIAGNOSIS — F41.8 OTHER SPECIFIED ANXIETY DISORDERS: ICD-10-CM

## 2018-09-17 DIAGNOSIS — K21.9 GASTRO-ESOPHAGEAL REFLUX DISEASE WITHOUT ESOPHAGITIS: ICD-10-CM

## 2018-09-17 DIAGNOSIS — R15.9 FULL INCONTINENCE OF FECES: ICD-10-CM

## 2018-09-17 DIAGNOSIS — Z98.49 CATARACT EXTRACTION STATUS, UNSPECIFIED EYE: Chronic | ICD-10-CM

## 2018-09-17 DIAGNOSIS — N40.0 BENIGN PROSTATIC HYPERPLASIA WITHOUT LOWER URINARY TRACT SYMPTOMS: ICD-10-CM

## 2018-09-17 DIAGNOSIS — I95.1 ORTHOSTATIC HYPOTENSION: ICD-10-CM

## 2018-09-17 DIAGNOSIS — I45.10 UNSPECIFIED RIGHT BUNDLE-BRANCH BLOCK: ICD-10-CM

## 2018-09-17 DIAGNOSIS — Z90.49 ACQUIRED ABSENCE OF OTHER SPECIFIED PARTS OF DIGESTIVE TRACT: Chronic | ICD-10-CM

## 2018-09-17 DIAGNOSIS — E86.0 DEHYDRATION: ICD-10-CM

## 2018-09-17 DIAGNOSIS — E86.1 HYPOVOLEMIA: ICD-10-CM

## 2018-09-17 LAB
ACETONE SERPL-MCNC: NEGATIVE — SIGNIFICANT CHANGE UP
ALBUMIN SERPL ELPH-MCNC: 3.3 G/DL — LOW (ref 3.5–5)
ALP SERPL-CCNC: 72 U/L — SIGNIFICANT CHANGE UP (ref 40–120)
ALT FLD-CCNC: 23 U/L DA — SIGNIFICANT CHANGE UP (ref 10–60)
ANION GAP SERPL CALC-SCNC: 8 MMOL/L — SIGNIFICANT CHANGE UP (ref 5–17)
APPEARANCE UR: CLEAR — SIGNIFICANT CHANGE UP
APTT BLD: 33.4 SEC — SIGNIFICANT CHANGE UP (ref 27.5–37.4)
AST SERPL-CCNC: 31 U/L — SIGNIFICANT CHANGE UP (ref 10–40)
BASOPHILS # BLD AUTO: 0.1 K/UL — SIGNIFICANT CHANGE UP (ref 0–0.2)
BASOPHILS NFR BLD AUTO: 0.9 % — SIGNIFICANT CHANGE UP (ref 0–2)
BILIRUB SERPL-MCNC: 0.7 MG/DL — SIGNIFICANT CHANGE UP (ref 0.2–1.2)
BILIRUB UR-MCNC: NEGATIVE — SIGNIFICANT CHANGE UP
BUN SERPL-MCNC: 21 MG/DL — HIGH (ref 7–18)
CALCIUM SERPL-MCNC: 8.7 MG/DL — SIGNIFICANT CHANGE UP (ref 8.4–10.5)
CHLORIDE SERPL-SCNC: 102 MMOL/L — SIGNIFICANT CHANGE UP (ref 96–108)
CK MB BLD-MCNC: 2.4 % — SIGNIFICANT CHANGE UP (ref 0–3.5)
CK MB CFR SERPL CALC: 2.7 NG/ML — SIGNIFICANT CHANGE UP (ref 0–3.6)
CK SERPL-CCNC: 112 U/L — SIGNIFICANT CHANGE UP (ref 35–232)
CO2 SERPL-SCNC: 23 MMOL/L — SIGNIFICANT CHANGE UP (ref 22–31)
COLOR SPEC: YELLOW — SIGNIFICANT CHANGE UP
CREAT SERPL-MCNC: 1.74 MG/DL — HIGH (ref 0.5–1.3)
DIFF PNL FLD: NEGATIVE — SIGNIFICANT CHANGE UP
EOSINOPHIL # BLD AUTO: 0 K/UL — SIGNIFICANT CHANGE UP (ref 0–0.5)
EOSINOPHIL NFR BLD AUTO: 0.3 % — SIGNIFICANT CHANGE UP (ref 0–6)
GLUCOSE SERPL-MCNC: 120 MG/DL — HIGH (ref 70–99)
GLUCOSE UR QL: NEGATIVE — SIGNIFICANT CHANGE UP
HCT VFR BLD CALC: 38.6 % — LOW (ref 39–50)
HGB BLD-MCNC: 12.7 G/DL — LOW (ref 13–17)
INR BLD: 0.97 RATIO — SIGNIFICANT CHANGE UP (ref 0.88–1.16)
KETONES UR-MCNC: NEGATIVE — SIGNIFICANT CHANGE UP
LEUKOCYTE ESTERASE UR-ACNC: NEGATIVE — SIGNIFICANT CHANGE UP
LIDOCAIN IGE QN: 102 U/L — SIGNIFICANT CHANGE UP (ref 73–393)
LYMPHOCYTES # BLD AUTO: 0.6 K/UL — LOW (ref 1–3.3)
LYMPHOCYTES # BLD AUTO: 7 % — LOW (ref 13–44)
MAGNESIUM SERPL-MCNC: 1.9 MG/DL — SIGNIFICANT CHANGE UP (ref 1.6–2.6)
MCHC RBC-ENTMCNC: 30.8 PG — SIGNIFICANT CHANGE UP (ref 27–34)
MCHC RBC-ENTMCNC: 32.8 GM/DL — SIGNIFICANT CHANGE UP (ref 32–36)
MCV RBC AUTO: 93.9 FL — SIGNIFICANT CHANGE UP (ref 80–100)
MONOCYTES # BLD AUTO: 1.4 K/UL — HIGH (ref 0–0.9)
MONOCYTES NFR BLD AUTO: 15.9 % — HIGH (ref 2–14)
NEUTROPHILS # BLD AUTO: 6.6 K/UL — SIGNIFICANT CHANGE UP (ref 1.8–7.4)
NEUTROPHILS NFR BLD AUTO: 76 % — SIGNIFICANT CHANGE UP (ref 43–77)
NITRITE UR-MCNC: NEGATIVE — SIGNIFICANT CHANGE UP
NT-PROBNP SERPL-SCNC: 433 PG/ML — SIGNIFICANT CHANGE UP (ref 0–450)
PH UR: 6.5 — SIGNIFICANT CHANGE UP (ref 5–8)
PLATELET # BLD AUTO: 231 K/UL — SIGNIFICANT CHANGE UP (ref 150–400)
POTASSIUM SERPL-MCNC: 4.1 MMOL/L — SIGNIFICANT CHANGE UP (ref 3.5–5.3)
POTASSIUM SERPL-SCNC: 4.1 MMOL/L — SIGNIFICANT CHANGE UP (ref 3.5–5.3)
PROT SERPL-MCNC: 6.6 G/DL — SIGNIFICANT CHANGE UP (ref 6–8.3)
PROT UR-MCNC: NEGATIVE — SIGNIFICANT CHANGE UP
PROTHROM AB SERPL-ACNC: 10.6 SEC — SIGNIFICANT CHANGE UP (ref 9.8–12.7)
RBC # BLD: 4.11 M/UL — LOW (ref 4.2–5.8)
RBC # FLD: 11.6 % — SIGNIFICANT CHANGE UP (ref 10.3–14.5)
SODIUM SERPL-SCNC: 133 MMOL/L — LOW (ref 135–145)
SP GR SPEC: 1.01 — SIGNIFICANT CHANGE UP (ref 1.01–1.02)
TROPONIN I SERPL-MCNC: <0.015 NG/ML — SIGNIFICANT CHANGE UP (ref 0–0.04)
UROBILINOGEN FLD QL: NEGATIVE — SIGNIFICANT CHANGE UP
WBC # BLD: 8.7 K/UL — SIGNIFICANT CHANGE UP (ref 3.8–10.5)
WBC # FLD AUTO: 8.7 K/UL — SIGNIFICANT CHANGE UP (ref 3.8–10.5)

## 2018-09-17 PROCEDURE — 73080 X-RAY EXAM OF ELBOW: CPT | Mod: 26,LT

## 2018-09-17 PROCEDURE — 70450 CT HEAD/BRAIN W/O DYE: CPT | Mod: 26

## 2018-09-17 PROCEDURE — 71045 X-RAY EXAM CHEST 1 VIEW: CPT | Mod: 26

## 2018-09-17 PROCEDURE — 73030 X-RAY EXAM OF SHOULDER: CPT | Mod: 26,LT

## 2018-09-17 PROCEDURE — 99285 EMERGENCY DEPT VISIT HI MDM: CPT

## 2018-09-17 RX ORDER — SODIUM CHLORIDE 9 MG/ML
1000 INJECTION INTRAMUSCULAR; INTRAVENOUS; SUBCUTANEOUS
Qty: 0 | Refills: 0 | Status: DISCONTINUED | OUTPATIENT
Start: 2018-09-17 | End: 2018-09-18

## 2018-09-17 RX ORDER — SODIUM CHLORIDE 9 MG/ML
3 INJECTION INTRAMUSCULAR; INTRAVENOUS; SUBCUTANEOUS ONCE
Qty: 0 | Refills: 0 | Status: COMPLETED | OUTPATIENT
Start: 2018-09-17 | End: 2018-09-17

## 2018-09-17 RX ORDER — ACETAMINOPHEN 500 MG
650 TABLET ORAL ONCE
Qty: 0 | Refills: 0 | Status: COMPLETED | OUTPATIENT
Start: 2018-09-17 | End: 2018-09-17

## 2018-09-17 RX ADMIN — SODIUM CHLORIDE 125 MILLILITER(S): 9 INJECTION INTRAMUSCULAR; INTRAVENOUS; SUBCUTANEOUS at 18:50

## 2018-09-17 RX ADMIN — SODIUM CHLORIDE 3 MILLILITER(S): 9 INJECTION INTRAMUSCULAR; INTRAVENOUS; SUBCUTANEOUS at 17:15

## 2018-09-17 NOTE — ED PROVIDER NOTE - MUSCULOSKELETAL, MLM
Spine appears normal, range of motion is not limited, Lt elbow- sl tenderness to palp., no deformity, hematoma post forearm, Lt shoulder-FROM, beny lower ext-FROM, generalized stiffness

## 2018-09-17 NOTE — ED PROVIDER NOTE - PMH
GERD (gastroesophageal reflux disease)    GERD (gastroesophageal reflux disease)    Hyperlipemia    Other glaucoma of both eyes    Other hyperlipidemia

## 2018-09-17 NOTE — ED PROVIDER NOTE - CARE PLAN
Principal Discharge DX:	Dehydration  Secondary Diagnosis:	Frequent falls  Secondary Diagnosis:	Renal insufficiency

## 2018-09-17 NOTE — ED PROVIDER NOTE - MEDICAL DECISION MAKING DETAILS
pt with frequent falls, confused, appeared to be dehydrated, concern for infectious process, will get labs, CT head, admission

## 2018-09-17 NOTE — ED ADULT NURSE NOTE - NSIMPLEMENTINTERV_GEN_ALL_ED
Implemented All Fall Risk Interventions:  [place] to call system. Call bell, personal items and telephone within reach. Instruct patient to call for assistance. Room bathroom lighting operational. Non-slip footwear when patient is off stretcher. Physically safe environment: no spills, clutter or unnecessary equipment. Stretcher in lowest position, wheels locked, appropriate side rails in place. Provide visual cue, wrist band, yellow gown, etc. Monitor gait and stability. Monitor for mental status changes and reorient to person, place, and time. Review medications for side effects contributing to fall risk. Reinforce activity limits and safety measures with patient and family.

## 2018-09-18 DIAGNOSIS — F32.9 MAJOR DEPRESSIVE DISORDER, SINGLE EPISODE, UNSPECIFIED: ICD-10-CM

## 2018-09-18 DIAGNOSIS — Z29.9 ENCOUNTER FOR PROPHYLACTIC MEASURES, UNSPECIFIED: ICD-10-CM

## 2018-09-18 DIAGNOSIS — E87.1 HYPO-OSMOLALITY AND HYPONATREMIA: ICD-10-CM

## 2018-09-18 DIAGNOSIS — K21.9 GASTRO-ESOPHAGEAL REFLUX DISEASE WITHOUT ESOPHAGITIS: ICD-10-CM

## 2018-09-18 DIAGNOSIS — N28.9 DISORDER OF KIDNEY AND URETER, UNSPECIFIED: ICD-10-CM

## 2018-09-18 DIAGNOSIS — R53.1 WEAKNESS: ICD-10-CM
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
## 2018-09-18 DIAGNOSIS — E78.5 HYPERLIPIDEMIA, UNSPECIFIED: ICD-10-CM

## 2018-09-18 DIAGNOSIS — E86.0 DEHYDRATION: ICD-10-CM

## 2018-09-18 LAB
ALBUMIN SERPL ELPH-MCNC: 3 G/DL — LOW (ref 3.5–5)
ALP SERPL-CCNC: 68 U/L — SIGNIFICANT CHANGE UP (ref 40–120)
ALT FLD-CCNC: 23 U/L DA — SIGNIFICANT CHANGE UP (ref 10–60)
ANION GAP SERPL CALC-SCNC: 7 MMOL/L — SIGNIFICANT CHANGE UP (ref 5–17)
AST SERPL-CCNC: 29 U/L — SIGNIFICANT CHANGE UP (ref 10–40)
BILIRUB SERPL-MCNC: 0.9 MG/DL — SIGNIFICANT CHANGE UP (ref 0.2–1.2)
BUN SERPL-MCNC: 17 MG/DL — SIGNIFICANT CHANGE UP (ref 7–18)
CALCIUM SERPL-MCNC: 8.6 MG/DL — SIGNIFICANT CHANGE UP (ref 8.4–10.5)
CHLORIDE SERPL-SCNC: 105 MMOL/L — SIGNIFICANT CHANGE UP (ref 96–108)
CHLORIDE UR-SCNC: 98 MMOL/L — SIGNIFICANT CHANGE UP (ref 55–125)
CO2 SERPL-SCNC: 23 MMOL/L — SIGNIFICANT CHANGE UP (ref 22–31)
CORTIS AM PEAK SERPL-MCNC: 11.6 UG/DL — SIGNIFICANT CHANGE UP (ref 6–18.4)
CREAT ?TM UR-MCNC: 91 MG/DL — SIGNIFICANT CHANGE UP
CREAT SERPL-MCNC: 1.48 MG/DL — HIGH (ref 0.5–1.3)
EOSINOPHIL NFR BLD AUTO: 1 % — SIGNIFICANT CHANGE UP (ref 0–6)
GLUCOSE SERPL-MCNC: 108 MG/DL — HIGH (ref 70–99)
HBA1C BLD-MCNC: 5.7 % — HIGH (ref 4–5.6)
HCT VFR BLD CALC: 39.1 % — SIGNIFICANT CHANGE UP (ref 39–50)
HGB BLD-MCNC: 12.9 G/DL — LOW (ref 13–17)
LYMPHOCYTES # BLD AUTO: 13 % — SIGNIFICANT CHANGE UP (ref 13–44)
MAGNESIUM SERPL-MCNC: 1.9 MG/DL — SIGNIFICANT CHANGE UP (ref 1.6–2.6)
MCHC RBC-ENTMCNC: 31.4 PG — SIGNIFICANT CHANGE UP (ref 27–34)
MCHC RBC-ENTMCNC: 32.9 GM/DL — SIGNIFICANT CHANGE UP (ref 32–36)
MCV RBC AUTO: 95.4 FL — SIGNIFICANT CHANGE UP (ref 80–100)
MONOCYTES NFR BLD AUTO: 21 % — HIGH (ref 2–14)
NEUTROPHILS NFR BLD AUTO: 65 % — SIGNIFICANT CHANGE UP (ref 43–77)
OSMOLALITY UR: 452 MOS/KG — SIGNIFICANT CHANGE UP (ref 50–1200)
PHOSPHATE SERPL-MCNC: 2.7 MG/DL — SIGNIFICANT CHANGE UP (ref 2.5–4.5)
PLAT MORPH BLD: NORMAL — SIGNIFICANT CHANGE UP
PLATELET # BLD AUTO: 206 K/UL — SIGNIFICANT CHANGE UP (ref 150–400)
POTASSIUM SERPL-MCNC: 3.8 MMOL/L — SIGNIFICANT CHANGE UP (ref 3.5–5.3)
POTASSIUM SERPL-SCNC: 3.8 MMOL/L — SIGNIFICANT CHANGE UP (ref 3.5–5.3)
POTASSIUM UR-SCNC: 13 MMOL/L — LOW (ref 25–125)
PROT ?TM UR-MCNC: 24 MG/DL — HIGH (ref 0–12)
PROT SERPL-MCNC: 6.3 G/DL — SIGNIFICANT CHANGE UP (ref 6–8.3)
RBC # BLD: 4.1 M/UL — LOW (ref 4.2–5.8)
RBC # FLD: 11.9 % — SIGNIFICANT CHANGE UP (ref 10.3–14.5)
RBC BLD AUTO: NORMAL — SIGNIFICANT CHANGE UP
SODIUM SERPL-SCNC: 135 MMOL/L — SIGNIFICANT CHANGE UP (ref 135–145)
SODIUM UR-SCNC: 99 MMOL/L — SIGNIFICANT CHANGE UP (ref 40–220)
TSH SERPL-MCNC: 0.77 UU/ML — SIGNIFICANT CHANGE UP (ref 0.34–4.82)
VIT B12 SERPL-MCNC: 524 PG/ML — SIGNIFICANT CHANGE UP (ref 232–1245)
WBC # BLD: 7.7 K/UL — SIGNIFICANT CHANGE UP (ref 3.8–10.5)
WBC # FLD AUTO: 7.7 K/UL — SIGNIFICANT CHANGE UP (ref 3.8–10.5)

## 2018-09-18 PROCEDURE — 73521 X-RAY EXAM HIPS BI 2 VIEWS: CPT | Mod: 26

## 2018-09-18 PROCEDURE — 99223 1ST HOSP IP/OBS HIGH 75: CPT | Mod: GC,AI

## 2018-09-18 RX ORDER — SIMVASTATIN 20 MG/1
40 TABLET, FILM COATED ORAL AT BEDTIME
Qty: 0 | Refills: 0 | Status: DISCONTINUED | OUTPATIENT
Start: 2018-09-18 | End: 2018-09-22

## 2018-09-18 RX ORDER — SODIUM CHLORIDE 9 MG/ML
1000 INJECTION INTRAMUSCULAR; INTRAVENOUS; SUBCUTANEOUS
Qty: 0 | Refills: 0 | Status: DISCONTINUED | OUTPATIENT
Start: 2018-09-18 | End: 2018-09-22

## 2018-09-18 RX ORDER — DORZOLAMIDE HYDROCHLORIDE TIMOLOL MALEATE 20; 5 MG/ML; MG/ML
1 SOLUTION/ DROPS OPHTHALMIC
Qty: 0 | Refills: 0 | Status: DISCONTINUED | OUTPATIENT
Start: 2018-09-18 | End: 2018-09-22

## 2018-09-18 RX ORDER — BRIMONIDINE TARTRATE 2 MG/MG
1 SOLUTION/ DROPS OPHTHALMIC
Qty: 0 | Refills: 0 | Status: DISCONTINUED | OUTPATIENT
Start: 2018-09-18 | End: 2018-09-22

## 2018-09-18 RX ORDER — SODIUM CHLORIDE 9 MG/ML
1000 INJECTION INTRAMUSCULAR; INTRAVENOUS; SUBCUTANEOUS
Qty: 0 | Refills: 0 | Status: DISCONTINUED | OUTPATIENT
Start: 2018-09-18 | End: 2018-09-18

## 2018-09-18 RX ORDER — FINASTERIDE 5 MG/1
5 TABLET, FILM COATED ORAL DAILY
Qty: 0 | Refills: 0 | Status: DISCONTINUED | OUTPATIENT
Start: 2018-09-18 | End: 2018-09-22

## 2018-09-18 RX ORDER — FLUTICASONE PROPIONATE 50 MCG
1 SPRAY, SUSPENSION NASAL DAILY
Qty: 0 | Refills: 0 | Status: DISCONTINUED | OUTPATIENT
Start: 2018-09-18 | End: 2018-09-22

## 2018-09-18 RX ORDER — QUETIAPINE FUMARATE 200 MG/1
50 TABLET, FILM COATED ORAL AT BEDTIME
Qty: 0 | Refills: 0 | Status: DISCONTINUED | OUTPATIENT
Start: 2018-09-18 | End: 2018-09-18

## 2018-09-18 RX ORDER — KETOROLAC TROMETHAMINE 0.5 %
1 DROPS OPHTHALMIC (EYE) DAILY
Qty: 0 | Refills: 0 | Status: DISCONTINUED | OUTPATIENT
Start: 2018-09-18 | End: 2018-09-22

## 2018-09-18 RX ORDER — ONDANSETRON 8 MG/1
4 TABLET, FILM COATED ORAL ONCE
Qty: 0 | Refills: 0 | Status: COMPLETED | OUTPATIENT
Start: 2018-09-18 | End: 2018-09-18

## 2018-09-18 RX ORDER — INFLUENZA VIRUS VACCINE 15; 15; 15; 15 UG/.5ML; UG/.5ML; UG/.5ML; UG/.5ML
0.5 SUSPENSION INTRAMUSCULAR ONCE
Qty: 0 | Refills: 0 | Status: COMPLETED | OUTPATIENT
Start: 2018-09-18 | End: 2018-09-21

## 2018-09-18 RX ORDER — HEPARIN SODIUM 5000 [USP'U]/ML
5000 INJECTION INTRAVENOUS; SUBCUTANEOUS EVERY 8 HOURS
Qty: 0 | Refills: 0 | Status: DISCONTINUED | OUTPATIENT
Start: 2018-09-18 | End: 2018-09-22

## 2018-09-18 RX ORDER — PANTOPRAZOLE SODIUM 20 MG/1
40 TABLET, DELAYED RELEASE ORAL
Qty: 0 | Refills: 0 | Status: DISCONTINUED | OUTPATIENT
Start: 2018-09-18 | End: 2018-09-22

## 2018-09-18 RX ADMIN — Medication 1 MILLIGRAM(S): at 22:46

## 2018-09-18 RX ADMIN — Medication 20 MILLIGRAM(S): at 21:26

## 2018-09-18 RX ADMIN — DORZOLAMIDE HYDROCHLORIDE TIMOLOL MALEATE 1 DROP(S): 20; 5 SOLUTION/ DROPS OPHTHALMIC at 18:02

## 2018-09-18 RX ADMIN — ONDANSETRON 4 MILLIGRAM(S): 8 TABLET, FILM COATED ORAL at 16:35

## 2018-09-18 RX ADMIN — HEPARIN SODIUM 5000 UNIT(S): 5000 INJECTION INTRAVENOUS; SUBCUTANEOUS at 21:27

## 2018-09-18 RX ADMIN — BRIMONIDINE TARTRATE 1 DROP(S): 2 SOLUTION/ DROPS OPHTHALMIC at 18:03

## 2018-09-18 RX ADMIN — SIMVASTATIN 40 MILLIGRAM(S): 20 TABLET, FILM COATED ORAL at 21:26

## 2018-09-18 RX ADMIN — FINASTERIDE 5 MILLIGRAM(S): 5 TABLET, FILM COATED ORAL at 11:28

## 2018-09-18 RX ADMIN — Medication 40 MILLIGRAM(S): at 09:46

## 2018-09-18 RX ADMIN — Medication 650 MILLIGRAM(S): at 00:18

## 2018-09-18 RX ADMIN — SODIUM CHLORIDE 80 MILLILITER(S): 9 INJECTION INTRAMUSCULAR; INTRAVENOUS; SUBCUTANEOUS at 21:54

## 2018-09-18 RX ADMIN — Medication 1 DROP(S): at 11:28

## 2018-09-18 RX ADMIN — Medication 650 MILLIGRAM(S): at 00:03

## 2018-09-18 RX ADMIN — HEPARIN SODIUM 5000 UNIT(S): 5000 INJECTION INTRAVENOUS; SUBCUTANEOUS at 13:28

## 2018-09-18 NOTE — H&P ADULT - PROBLEM SELECTOR PLAN 3
c/w pantoprazole 40mg Qd. Cr 1.74, BUN 21  Likely pre-renal in the setting of decreased PO, and has unilateral left kidney  c/w IVF and encourage PO intake/hydration  FU BMP - Na 133, likely hypovolemic hyponatremia in the setting of decreased PO intake  - c/w IVF and encourage PO intake/hydration  - FU BMP

## 2018-09-18 NOTE — H&P ADULT - PROBLEM SELECTOR PLAN 1
Na 127, likely hypovolemic hyponatremia in the setting of decreased PO intake. Pt AOx3. s/p 1L NS  - recheck BMP, consider starting maintenance fluids 120 cc/Hr; monitor with frequent lung checks   - f/u Urine lytes to calc FENa  - continue to encourage PO intake. -p/w lethargy and fall  - UA normal  - CT head normal  - EKG shows NSR @ 69bpm with RBBB  - c/w IVF and encourage PO intake/hydration  - PT and SW consult  - FU xray hip-pelvis -p/w lethargy and fall  - UA normal  - CT head normal  - EKG shows NSR @ 69bpm with RBBB  - c/w IVF and encourage PO intake/hydration  - PT and SW consult -p/w lethargy and fall, orthostatics positive, likely 2/2 poor PO intake  - has tremor, poor balance, confusion could be underlying parkinson's   - UA normal  - CT head normal  - EKG shows NSR @ 69bpm with RBBB  - c/w IVF and encourage PO intake/hydration  - PT and SW consult -p/w lethargy and fall, orthostatics positive, likely 2/2 poor PO intake  - has tremor, poor balance, confusion could be underlying parkinson's   - was started on quetiapine and paroxetine recently so could be underlying depression   - UA normal  - CT head normal  - EKG shows NSR @ 69bpm with RBBB  - c/w IVF and encourage PO intake/hydration  - PT and SW consult

## 2018-09-18 NOTE — H&P ADULT - PROBLEM SELECTOR PLAN 2
Cr 1.74, BUN 21  Likely pre-renal in the setting of decreased PO, and has unilateral left kidney  c/w IVF and encourage PO intake/hydration  FU BMP - Na 133, likely hypovolemic hyponatremia in the setting of decreased PO intake  - c/w IVF and encourage PO intake/hydration  - FU BMP -p/w fall from bed on left side of body  - FU xray hip-pelvis

## 2018-09-18 NOTE — H&P ADULT - HISTORY OF PRESENT ILLNESS
82yo AO X3 M from home, lives alone, with PMH of GERD, HLD, BPH, single unilateral lt. kidney and PSH of cholecystectomy and Cataract surgery presented to ED c/o weakness and confusion. He is a poor historian, states he fell 2 weeks ago when he was trying to get off from bed, denies any visual changes/N/V/ seizure like activity/loss of consciousness/headache. However, he states that since past few weeks he has not been able to take care of himself and is unable to walk like before. He also endorses urinary and stool incontinence.       ED course: Vitals: BP:  HR:  RR:  T:  SaO2:  Labs significant for  Radiological findings  Antimicrobial [] IVF[]  GOC: 84yo AO X3 M from home, lives alone, with PMH of GERD, HLD, BPH, single unilateral lt. kidney and PSH of cholecystectomy and Cataract surgery presented to ED c/o weakness and confusion. He is a poor historian, states he fell 2 weeks ago when he was trying to get off from bed, denies any visual changes/N/V/ seizure like activity/loss of consciousness/headache. However, he states that since past few weeks he has not been able to take care of himself and is unable to walk like before. He also endorses urinary and stool incontinence.       ED course: Vitals: BP: 131/98 HR: 85  RR: 16 T: 98.1  SaO2: 96  Labs significant for Na 133,  Radiological findings  Antimicrobial [] IVF[]  GOC: 82yo AO X3 M from home, lives alone, with PMH of GERD, HLD, BPH, single unilateral lt. kidney and PSH of cholecystectomy and Cataract surgery presented to ED c/o weakness and confusion. He is a poor historian, states he fell 2 weeks ago when he was trying to get off from bed and hurt his left hip and arm, denies any visual changes/N/V/ seizure like activity/loss of consciousness/headache. However, he states that since past few weeks he has not been able to take care of himself and is unable to walk like before. He also endorses urinary and stool incontinence.       ED course: Vitals: BP: 131/98 HR: 85  RR: 16 T: 98.1  SaO2: 96  Labs significant for Na 133, Cr 1.74  CT head negative for acute events. 84yo AO X3 M from home, lives alone, with PMH of GERD, HLD, BPH, single unilateral lt. kidney and PSH of cholecystectomy and Cataract surgery presented to ED c/o weakness and confusion. He is a poor historian, states he fell 2 weeks ago when he was trying to get off from bed and hurt his left hip and arm, denies any visual changes/N/V/ seizure like activity/loss of consciousness/headache. However, he states that since past few weeks he has not been able to take care of himself and is unable to walk like before. He also endorses urinary and stool incontinence.       ED course: Vitals: BP: 131/98 HR: 85  RR: 16 T: 98.1  SaO2: 96  Labs significant for Na 133, Cr 1.74  CT head negative for acute events.  EKG shows NSR @69 bpm with RBBB 84yo AO X3 M from home, lives alone, with PMH of GERD, HLD, BPH, single unilateral lt. kidney and PSH of cholecystectomy and Cataract surgery presented to ED c/o weakness and confusion for 2 weeks. He is a poor historian, states he fell 2 weeks ago when he was trying to get off from bed and hurt his left hip and arm, denies any visual changes/N/V/ seizure like activity/loss of consciousness/headache. However, he states that since past few weeks he has not been able to take care of himself and is unable to walk like before. He also endorses urinary and stool incontinence.       ED course: Vitals: BP: 131/98 HR: 85  RR: 16 T: 98.1  SaO2: 96  Labs significant for Na 133, Cr 1.74  CT head negative for acute events.  EKG shows NSR @69 bpm with RBBB 84yo AO X3 M from home, lives alone, with PMH of GERD, HLD, BPH, single unilateral lt. kidney and PSH of cholecystectomy and Cataract surgery presented to ED c/o weakness and confusion for 2 weeks. He is a poor historian, states he fell 2 weeks ago when he was trying to get off from bed and hurt his left hip and arm, denies any visual changes/N/V/ seizure like activity/loss of consciousness/headache. However, he states that since past few weeks he has not been able to take care of himself and is unable to walk like before. He also endorses urinary and stool incontinence. He also complaints of hand tremors, dizziness and decreased appetite.      ED course: Vitals: BP: 131/98 HR: 85  RR: 16 T: 98.1  SaO2: 96  Labs significant for Na 133, Cr 1.74  CT head negative for acute events.  orthostatics positive  EKG shows NSR @69 bpm with RBBB 84yo AO X3 M from home, lives alone, with PMH of GERD, HLD, BPH, single unilateral lt. kidney and PSH of cholecystectomy and Cataract surgery presented to ED c/o weakness and confusion for 2 weeks. He is a poor historian, states he fell 2 weeks ago when he was trying to get off from bed and hurt his left hip and arm, denies any visual changes/N/V/ seizure like activity/loss of consciousness/headache. However, he states that since past few weeks he has not been able to take care of himself and is unable to walk like before. He also endorses urinary and stool incontinence. He also complaints of hand tremors, dizziness and decreased appetite.    History obtained from daughter over phone, she states he was living with her in [place] and moved to [place] on july, since august he has been having worsening confusion with difficulty taking his medications at right time. He had urine infection 1 week ago and was  given cipro by urgent care. She states she does not know if he took the antibiotic but he has not been doing good since then. She was informed about his condition by family friend who lives near him and she flew to [place] 2 days ago. She was planning to take him with her but saw his apartment was messed up with urine and feces all over and so she brought her here. She also states he was started on quetiapine and paroxetine about 1 year ago by his PCP.       ED course: Vitals: BP: 131/98 HR: 85  RR: 16 T: 98.1  SaO2: 96  Labs significant for Na 133, Cr 1.74  CT head negative for acute events.  orthostatics positive  EKG shows NSR @69 bpm with RBBB

## 2018-09-18 NOTE — H&P ADULT - PROBLEM SELECTOR PLAN 5
IMPROVE VTE Individual Risk Assessment    RISK                                                                Points    [  ] Previous VTE                                                  3  [  ] Thrombophilia                                               2  [  ] Lower limb paralysis                                      2        (unable to hold up >15 seconds)    [  ] Current Cancer                                              2         (within 6 months)  [ x ] Immobilization > 24 hrs                                1  [  ] ICU/CCU stay > 24 hours                              1  [ x ] Age > 60                                                      1    IMPROVE VTE Score ____2_____  Will start on heaprin SC c/w simvastatin 40mg. c/w pantoprazole 40mg Qd. - on quetiapine 50mg at bedtime and paroxetine 40mg morning and 20mg evening

## 2018-09-18 NOTE — H&P ADULT - PROBLEM SELECTOR PLAN 8
IMPROVE VTE Individual Risk Assessment    RISK                                                                Points    [  ] Previous VTE                                                  3  [  ] Thrombophilia                                               2  [  ] Lower limb paralysis                                      2        (unable to hold up >15 seconds)    [  ] Current Cancer                                              2         (within 6 months)  [ x ] Immobilization > 24 hrs                                1  [  ] ICU/CCU stay > 24 hours                              1  [ x ] Age > 60                                                      1    IMPROVE VTE Score ____2_____  Will start on heaprin SC

## 2018-09-18 NOTE — H&P ADULT - PROBLEM SELECTOR PLAN 7
IMPROVE VTE Individual Risk Assessment    RISK                                                                Points    [  ] Previous VTE                                                  3  [  ] Thrombophilia                                               2  [  ] Lower limb paralysis                                      2        (unable to hold up >15 seconds)    [  ] Current Cancer                                              2         (within 6 months)  [ x ] Immobilization > 24 hrs                                1  [  ] ICU/CCU stay > 24 hours                              1  [ x ] Age > 60                                                      1    IMPROVE VTE Score ____2_____  Will start on heaprin SC c/w simvastatin 40mg.

## 2018-09-18 NOTE — H&P ADULT - ASSESSMENT
82yo AO X3 M from home, lives alone, with PMH of GERD, HLD, BPH, single unilateral lt. kidney and PSH of cholecystectomy and Cataract surgery presented to ED c/o weakness and confusion for 2 weeks.    ED course: Vitals: BP: 131/98 HR: 85  RR: 16 T: 98.1  SaO2: 96  Labs significant for Na 133, Cr 1.74  CT head negative for acute events.  EKG shows NSR @69 bpm with RBBB 82yo AO X3 M from home, lives alone, with PMH of GERD, HLD, BPH, single unilateral lt. kidney and PSH of cholecystectomy and Cataract surgery presented to ED c/o worsening weakness and confusion for 2 weeks.    ED course: Vitals: BP: 131/98 HR: 85  RR: 16 T: 98.1  SaO2: 96  Labs significant for Na 133, Cr 1.74  CT head negative for acute events.  EKG shows NSR @69 bpm with RBBB    He is admitted to work up for dementia.

## 2018-09-18 NOTE — H&P ADULT - NSHPSOCIALHISTORY_GEN_ALL_CORE
Denies use of alcohol, cigarettes and recreational drugs. Denies use of alcohol, cigarettes and recreational drugs. Lives alone, is retired.

## 2018-09-18 NOTE — H&P ADULT - NEUROLOGICAL DETAILS
superficial reflexes intact/sensation intact/deep reflexes intact/cranial nerves intact/alert and oriented x 3

## 2018-09-18 NOTE — H&P ADULT - PROBLEM SELECTOR PLAN 4
c/w simvastatin 40mg. c/w pantoprazole 40mg Qd. Cr 1.74, BUN 21  Likely pre-renal in the setting of decreased PO, and has unilateral left kidney  c/w IVF and encourage PO intake/hydration  FU BMP

## 2018-09-18 NOTE — H&P ADULT - ATTENDING COMMENTS
Patient seen and examined; Agree with PGY1 A/P above with editing as needed. PGY1 d/w Dr. Yu Patient seen and examined; Agree with PGY1 A/P above with editing as needed. PGY1 d/w Dr. Yu    84yo AO X3 M from home, lives alone, with PMH of GERD, HLD, BPH, Depression, single unilateral lt. kidney and PSH of cholecystectomy and Cataract surgery presented to ED c/o weakness and confusion for 2 weeks. Patient s/p fall from couch and bed in the last week or so. Xrays negative for fracture in ED. Patient found to be dehydrated in ED. As per daughter at bedside, Patient  was doing well when he left from [place]. As per neighbors[facility] but seems like has been eating less lately. Patient was given a course of Cipro by Urgent care for UTI last week.   As per daughter, once patient is better she intents to take him back to [place] with her. Patient has all MDs there.    Vital Signs Last 24 Hrs  T(C): 36.7 (18 Sep 2018 14:15), Max: 36.7 (17 Sep 2018 23:42)  T(F): 98.1 (18 Sep 2018 14:15), Max: 98.1 (17 Sep 2018 23:42)  HR: 70 (18 Sep 2018 14:15) (67 - 85)  BP: 123/63 (18 Sep 2018 14:15) (113/76 - 140/80)  RR: 18 (18 Sep 2018 14:15) (16 - 18)  SpO2: 97% (18 Sep 2018 14:15) (94% - 97%)    P/E:  Neuro: Alert and Awake, oriented x 1; able to follow commands  CVS: S1S2 present, Regular  Resp: BLAE+, nO wheeze or Rhonchi  GI: Soft, BS+, NT, ND  Extr: No edema or calf tenderness B/L LE    Labs:                        12.9   7.7   )-----------( 206      ( 18 Sep 2018 09:54 )             39.1   09-18    135  |  105  |  17  ----------------------------<  108<H>  3.8   |  23  |  1.48<H>    Ca    8.6      18 Sep 2018 09:55  Phos  2.7     09-18  Mg     1.9     09-18    TPro  6.3  /  Alb  3.0<L>  /  TBili  0.9  /  DBili  x   /  AST  29  /  ALT  23  /  AlkPhos  68  09-18    CT Head No Cont (09.17.18 @ 23:38)    FINDINGS: No acute intracranial hemorrhage, mass effect or midline shift.  No CT evidence of acute large territory vascular infarct.  The ventricles and cortical sulci are prominent reflecting parenchymal volume loss.  Scattered hypodensities in the periventricular white matter are nonspecific, but likely sequela of small vessel ischemic disease.  Intracranial atherosclerotic calcifications are present.    Complete opacification of the visualized left andnear complete opacification of the visualized right maxillary sinuses, and complete   opacification of the left sphenoid sinus, with hyperdense contents which may be related to inspissation or fungal superinfection. Mucosal   thickening of the bilateral ethmoid air cells. The mastoid air cells are well aerated. The native ocular lenses are surgically absent.  No displaced calvarial fracture.    IMPRESSION: No acute intracranial hemorrhage or mass effect.    Paranasal sinus disease as described, with complete opacification of the left sphenoid and maxillary sinuses.    12 Lead ECG (09.17.18 @ 17:13)    Diagnosis Line *** Poor data quality, interpretation may be adversely affected  Normal sinus rhythm  Right bundle branch block  Abnormal ECG    D/D:  Failure to thrive possibly related to poor oral intake  LORENZO due to decreased oral intake  Hypovolemic Hyponatremia improved  Recurrent falls likely related to delayed postural reflexes   Depression with Anxiety with possible dementia  Hx BPH, Glaucoma,    Plan:  Medicine; Gentle IV hydration  PT evaluation  Psych consult in AM once mental status improved  Hold Quetiapine  Dementia work up: TSH, B12, Folate, Vitamin D levels  DVT ppx  Fall and Aspiration precautions   evaluation     Discussed with PGY 2 Floor Dr. Gonzáles Patient seen and examined; Agree with PGY1 A/P above with editing as needed. PGY1 d/w Dr. Yu    84yo AO X3 M from home, lives alone, with PMH of GERD, HLD, BPH, Depression, single unilateral lt. kidney and PSH of cholecystectomy and Cataract surgery presented to ED c/o weakness and confusion for 2 weeks. Patient s/p fall from couch and bed in the last week or so. Xrays negative for fracture in ED. Patient found to be dehydrated in ED. As per daughter at bedside, Patient  was doing well when he left from [place]. As per neighbors[facility] but seems like has been eating less lately. Patient was given a course of Cipro by Urgent care for UTI last week.   As per daughter, once patient is better she intents to take him back to [place] with her. Patient has all MDs there. baseline Creatinine around 1,5;     Vital Signs Last 24 Hrs  T(C): 36.7 (18 Sep 2018 14:15), Max: 36.7 (17 Sep 2018 23:42)  T(F): 98.1 (18 Sep 2018 14:15), Max: 98.1 (17 Sep 2018 23:42)  HR: 70 (18 Sep 2018 14:15) (67 - 85)  BP: 123/63 (18 Sep 2018 14:15) (113/76 - 140/80)  RR: 18 (18 Sep 2018 14:15) (16 - 18)  SpO2: 97% (18 Sep 2018 14:15) (94% - 97%)    P/E:  Neuro: Alert and Awake, oriented x 1; able to follow commands  CVS: S1S2 present, Regular  Resp: BLAE+, nO wheeze or Rhonchi  GI: Soft, BS+, NT, ND  Extr: No edema or calf tenderness B/L LE    Labs:                        12.9   7.7   )-----------( 206      ( 18 Sep 2018 09:54 )             39.1   09-18    135  |  105  |  17  ----------------------------<  108<H>  3.8   |  23  |  1.48<H>    Ca    8.6      18 Sep 2018 09:55  Phos  2.7     09-18  Mg     1.9     09-18    TPro  6.3  /  Alb  3.0<L>  /  TBili  0.9  /  DBili  x   /  AST  29  /  ALT  23  /  AlkPhos  68  09-18    CT Head No Cont (09.17.18 @ 23:38)    FINDINGS: No acute intracranial hemorrhage, mass effect or midline shift.  No CT evidence of acute large territory vascular infarct.  The ventricles and cortical sulci are prominent reflecting parenchymal volume loss.  Scattered hypodensities in the periventricular white matter are nonspecific, but likely sequela of small vessel ischemic disease.  Intracranial atherosclerotic calcifications are present.    Complete opacification of the visualized left and near complete opacification of the visualized right maxillary sinuses, and complete   opacification of the left sphenoid sinus, with hyperdense contents which may be related to inspissation or fungal superinfection. Mucosal   thickening of the bilateral ethmoid air cells. The mastoid air cells are well aerated. The native ocular lenses are surgically absent.  No displaced calvarial fracture.    IMPRESSION: No acute intracranial hemorrhage or mass effect.    Paranasal sinus disease as described, with complete opacification of the left sphenoid and maxillary sinuses.    12 Lead ECG (09.17.18 @ 17:13)    Diagnosis Line *** Poor data quality, interpretation may be adversely affected  Normal sinus rhythm  Right bundle branch block  Abnormal ECG    D/D:  Failure to thrive possibly related to poor oral intake  LORENZO due to decreased oral intake with likely CKD 3  Hypovolemic Hyponatremia improved  Recurrent falls likely related to delayed postural reflexes   Depression with Anxiety with possible dementia  Hx BPH, Glaucoma,    Plan:  Medicine; Gentle IV hydration  PT evaluation  Psych consult in AM once mental status improved  Hold Quetiapine  Dementia work up: TSH, B12, Folate, Vitamin D levels  DVT ppx  Fall and Aspiration precautions   evaluation     Discussed with PGY 2 Floor Dr. Gonzáles  Discussed with daughter at bedside findings and plan of care.

## 2018-09-19 DIAGNOSIS — G93.40 ENCEPHALOPATHY, UNSPECIFIED: ICD-10-CM

## 2018-09-19 LAB
ALBUMIN SERPL ELPH-MCNC: 2.8 G/DL — LOW (ref 3.5–5)
ALP SERPL-CCNC: 60 U/L — SIGNIFICANT CHANGE UP (ref 40–120)
ALT FLD-CCNC: 22 U/L DA — SIGNIFICANT CHANGE UP (ref 10–60)
ANION GAP SERPL CALC-SCNC: 9 MMOL/L — SIGNIFICANT CHANGE UP (ref 5–17)
APTT BLD: 30.7 SEC — SIGNIFICANT CHANGE UP (ref 27.5–37.4)
AST SERPL-CCNC: 25 U/L — SIGNIFICANT CHANGE UP (ref 10–40)
BASOPHILS # BLD AUTO: 0 K/UL — SIGNIFICANT CHANGE UP (ref 0–0.2)
BASOPHILS NFR BLD AUTO: 0.5 % — SIGNIFICANT CHANGE UP (ref 0–2)
BILIRUB SERPL-MCNC: 0.7 MG/DL — SIGNIFICANT CHANGE UP (ref 0.2–1.2)
BUN SERPL-MCNC: 17 MG/DL — SIGNIFICANT CHANGE UP (ref 7–18)
CALCIUM SERPL-MCNC: 8.5 MG/DL — SIGNIFICANT CHANGE UP (ref 8.4–10.5)
CHLORIDE SERPL-SCNC: 108 MMOL/L — SIGNIFICANT CHANGE UP (ref 96–108)
CHOLEST SERPL-MCNC: 158 MG/DL — SIGNIFICANT CHANGE UP (ref 10–199)
CO2 SERPL-SCNC: 22 MMOL/L — SIGNIFICANT CHANGE UP (ref 22–31)
CREAT SERPL-MCNC: 1.32 MG/DL — HIGH (ref 0.5–1.3)
CULTURE RESULTS: SIGNIFICANT CHANGE UP
EOSINOPHIL # BLD AUTO: 0 K/UL — SIGNIFICANT CHANGE UP (ref 0–0.5)
EOSINOPHIL NFR BLD AUTO: 0.3 % — SIGNIFICANT CHANGE UP (ref 0–6)
FOLATE SERPL-MCNC: 17.6 NG/ML — SIGNIFICANT CHANGE UP
GLUCOSE SERPL-MCNC: 92 MG/DL — SIGNIFICANT CHANGE UP (ref 70–99)
HBA1C BLD-MCNC: 5.8 % — HIGH (ref 4–5.6)
HCT VFR BLD CALC: 34.6 % — LOW (ref 39–50)
HCYS SERPL-MCNC: 13 UMOL/L — SIGNIFICANT CHANGE UP (ref 5–20)
HDLC SERPL-MCNC: 58 MG/DL — SIGNIFICANT CHANGE UP
HGB BLD-MCNC: 11.5 G/DL — LOW (ref 13–17)
INR BLD: 1.04 RATIO — SIGNIFICANT CHANGE UP (ref 0.88–1.16)
LIPID PNL WITH DIRECT LDL SERPL: 88 MG/DL — SIGNIFICANT CHANGE UP
LYMPHOCYTES # BLD AUTO: 0.9 K/UL — LOW (ref 1–3.3)
LYMPHOCYTES # BLD AUTO: 11.1 % — LOW (ref 13–44)
MAGNESIUM SERPL-MCNC: 2 MG/DL — SIGNIFICANT CHANGE UP (ref 1.6–2.6)
MCHC RBC-ENTMCNC: 31.1 PG — SIGNIFICANT CHANGE UP (ref 27–34)
MCHC RBC-ENTMCNC: 33.1 GM/DL — SIGNIFICANT CHANGE UP (ref 32–36)
MCV RBC AUTO: 94 FL — SIGNIFICANT CHANGE UP (ref 80–100)
MONOCYTES # BLD AUTO: 1.4 K/UL — HIGH (ref 0–0.9)
MONOCYTES NFR BLD AUTO: 16.9 % — HIGH (ref 2–14)
NEUTROPHILS # BLD AUTO: 5.9 K/UL — SIGNIFICANT CHANGE UP (ref 1.8–7.4)
NEUTROPHILS NFR BLD AUTO: 71.1 % — SIGNIFICANT CHANGE UP (ref 43–77)
PHOSPHATE SERPL-MCNC: 2.6 MG/DL — SIGNIFICANT CHANGE UP (ref 2.5–4.5)
PLATELET # BLD AUTO: 227 K/UL — SIGNIFICANT CHANGE UP (ref 150–400)
POTASSIUM SERPL-MCNC: 4 MMOL/L — SIGNIFICANT CHANGE UP (ref 3.5–5.3)
POTASSIUM SERPL-SCNC: 4 MMOL/L — SIGNIFICANT CHANGE UP (ref 3.5–5.3)
PROT SERPL-MCNC: 6 G/DL — SIGNIFICANT CHANGE UP (ref 6–8.3)
PROTHROM AB SERPL-ACNC: 11.4 SEC — SIGNIFICANT CHANGE UP (ref 9.8–12.7)
RBC # BLD: 3.68 M/UL — LOW (ref 4.2–5.8)
RBC # FLD: 11.8 % — SIGNIFICANT CHANGE UP (ref 10.3–14.5)
SODIUM SERPL-SCNC: 139 MMOL/L — SIGNIFICANT CHANGE UP (ref 135–145)
SPECIMEN SOURCE: SIGNIFICANT CHANGE UP
T PALLIDUM AB TITR SER: NEGATIVE — SIGNIFICANT CHANGE UP
TOTAL CHOLESTEROL/HDL RATIO MEASUREMENT: 2.7 RATIO — LOW (ref 3.4–9.6)
TRIGL SERPL-MCNC: 58 MG/DL — SIGNIFICANT CHANGE UP (ref 10–149)
TSH SERPL-MCNC: 0.54 UU/ML — SIGNIFICANT CHANGE UP (ref 0.34–4.82)
VIT B12 SERPL-MCNC: 597 PG/ML — SIGNIFICANT CHANGE UP (ref 232–1245)
WBC # BLD: 8.3 K/UL — SIGNIFICANT CHANGE UP (ref 3.8–10.5)
WBC # FLD AUTO: 8.3 K/UL — SIGNIFICANT CHANGE UP (ref 3.8–10.5)

## 2018-09-19 PROCEDURE — 99233 SBSQ HOSP IP/OBS HIGH 50: CPT | Mod: GC

## 2018-09-19 RX ORDER — ONDANSETRON 8 MG/1
4 TABLET, FILM COATED ORAL ONCE
Qty: 0 | Refills: 0 | Status: DISCONTINUED | OUTPATIENT
Start: 2018-09-19 | End: 2018-09-22

## 2018-09-19 RX ORDER — QUETIAPINE FUMARATE 200 MG/1
25 TABLET, FILM COATED ORAL AT BEDTIME
Qty: 0 | Refills: 0 | Status: DISCONTINUED | OUTPATIENT
Start: 2018-09-19 | End: 2018-09-21

## 2018-09-19 RX ADMIN — HEPARIN SODIUM 5000 UNIT(S): 5000 INJECTION INTRAVENOUS; SUBCUTANEOUS at 21:53

## 2018-09-19 RX ADMIN — DORZOLAMIDE HYDROCHLORIDE TIMOLOL MALEATE 1 DROP(S): 20; 5 SOLUTION/ DROPS OPHTHALMIC at 06:07

## 2018-09-19 RX ADMIN — Medication 1 SPRAY(S): at 17:39

## 2018-09-19 RX ADMIN — SIMVASTATIN 40 MILLIGRAM(S): 20 TABLET, FILM COATED ORAL at 21:53

## 2018-09-19 RX ADMIN — Medication 20 MILLIGRAM(S): at 21:53

## 2018-09-19 RX ADMIN — Medication 1 DROP(S): at 17:38

## 2018-09-19 RX ADMIN — Medication 40 MILLIGRAM(S): at 10:14

## 2018-09-19 RX ADMIN — BRIMONIDINE TARTRATE 1 DROP(S): 2 SOLUTION/ DROPS OPHTHALMIC at 06:06

## 2018-09-19 RX ADMIN — DORZOLAMIDE HYDROCHLORIDE TIMOLOL MALEATE 1 DROP(S): 20; 5 SOLUTION/ DROPS OPHTHALMIC at 17:38

## 2018-09-19 RX ADMIN — BRIMONIDINE TARTRATE 1 DROP(S): 2 SOLUTION/ DROPS OPHTHALMIC at 17:39

## 2018-09-19 RX ADMIN — FINASTERIDE 5 MILLIGRAM(S): 5 TABLET, FILM COATED ORAL at 17:38

## 2018-09-19 RX ADMIN — HEPARIN SODIUM 5000 UNIT(S): 5000 INJECTION INTRAVENOUS; SUBCUTANEOUS at 06:06

## 2018-09-19 NOTE — PROGRESS NOTE ADULT - PROBLEM SELECTOR PLAN 2
-p/w fall from bed on left side of body  - FU xray hip-pelvis -p/w fall from bed on left side of body  - shoulder and Xip showed no acute fracture   fall precaution  - patient was orthostatic positive on admission   -likely due to dehydration and hypotension    - continue with iV fluids   - fall precautions  - PT bryanna

## 2018-09-19 NOTE — PROGRESS NOTE ADULT - PROBLEM SELECTOR PLAN 1
-p/w lethargy and fall, orthostatics positive, likely 2/2 poor PO intake  - has tremor, poor balance, confusion could be underlying parkinson's   - was started on quetiapine and paroxetine recently so could be underlying depression   - UA normal  - CT head normal  - EKG shows NSR @ 69bpm with RBBB  - c/w IVF and encourage PO intake/hydration  - PT and SW consult -p/w lethargy and fall, orthostatics positive, likely 2/2 poor PO intake  - has tremor, poor balance, confusion could be underlying parkinson's   - c/w paroxetine home dose  - UA normal, Cx neg  - CT head normal  - c/w IVF and encourage PO intake/hydration  - PT and SW consult  likely metabolic Vs worsening of dementia   - psych and neurology consult once patient is more alert   - avoid ativan for agitation   - haldol prn 0.5 mg for agitation   - Seroquel 25 mg Qhs PRN for agitation  - aspiration and fall precautions

## 2018-09-19 NOTE — PROGRESS NOTE ADULT - PROBLEM SELECTOR PLAN 4
Cr 1.74, BUN 21  Likely pre-renal in the setting of decreased PO, and has unilateral left kidney  c/w IVF and encourage PO intake/hydration  FU BMP Cr 1.74, BUN 21  Likely pre-renal in the setting of decreased PO, and has unilateral left kidney  c/w IVF and encourage PO intake/hydration   Cr 1.3   improving   c/w iV fluids

## 2018-09-19 NOTE — PROGRESS NOTE ADULT - SUBJECTIVE AND OBJECTIVE BOX
PGY 2 Note discussed with supervising  primary attending    Patient is a 83y old  Male who presents with a chief complaint of Weakness and confusion (18 Sep 2018 02:43)      INTERVAL HPI/OVERNIGHT EVENTS: Patient seen and examined at bed side, pt is more lethargic today. overnight patient was agitated s/p 1 mg Ativan given.      MEDICATIONS  (STANDING):  brimonidine 0.2% Ophthalmic Solution 1 Drop(s) Both EYES two times a day  dorzolamide 2%/timolol 0.5% Ophthalmic Solution 1 Drop(s) Both EYES two times a day  finasteride 5 milliGRAM(s) Oral daily  fluticasone propionate 50 MICROgram(s)/spray Nasal Spray 1 Spray(s) Both Nostrils daily  heparin  Injectable 5000 Unit(s) SubCutaneous every 8 hours  influenza   Vaccine 0.5 milliLiter(s) IntraMuscular once  ketorolac 0.5% Ophthalmic Solution 1 Drop(s) Both EYES daily  ondansetron Injectable 4 milliGRAM(s) IV Push once  pantoprazole    Tablet 40 milliGRAM(s) Oral before breakfast  PARoxetine 20 milliGRAM(s) Oral at bedtime  PARoxetine 40 milliGRAM(s) Oral <User Schedule>  simvastatin 40 milliGRAM(s) Oral at bedtime  sodium chloride 0.9%. 1000 milliLiter(s) (80 mL/Hr) IV Continuous <Continuous>    MEDICATIONS  (PRN):      __________________________________________________  REVIEW OF SYSTEMS Not able to asses due to lethargy       Vital Signs Last 24 Hrs  T(C): 36.2 (19 Sep 2018 09:10), Max: 36.9 (18 Sep 2018 20:55)  T(F): 97.2 (19 Sep 2018 09:10), Max: 98.4 (18 Sep 2018 20:55)  HR: 74 (19 Sep 2018 09:10) (70 - 81)  BP: 108/72 (19 Sep 2018 09:10) (108/72 - 127/99)  BP(mean): --  RR: 17 (19 Sep 2018 09:10) (17 - 18)  SpO2: 98% (19 Sep 2018 09:10) (95% - 98%)    ________________________________________________  PHYSICAL EXAM:  GENERAL: drowsy   HEENT: Normocephalic;  conjunctivae and sclerae clear; moist mucous membranes;   NECK : supple  CHEST/LUNG: Clear to auscultation bilaterally with good air entry   HEART: S1 S2  regular; no murmurs, gallops or rubs  ABDOMEN: Soft, Nontender, Nondistended; Bowel sounds present  EXTREMITIES: no cyanosis; no edema; no calf tenderness  SKIN: warm and dry; no rash  NERVOUS SYSTEM:  patient is lethargic, but arousal to pain     _________________________________________________  LABS:                        11.5   8.3   )-----------( 227      ( 19 Sep 2018 07:30 )             34.6         139  |  108  |  17  ----------------------------<  92  4.0   |  22  |  1.32<H>    Ca    8.5      19 Sep 2018 07:30  Phos  2.6       Mg     2.0         TPro  6.0  /  Alb  2.8<L>  /  TBili  0.7  /  DBili  x   /  AST  25  /  ALT  22  /  AlkPhos  60      PT/INR - ( 19 Sep 2018 07:30 )   PT: 11.4 sec;   INR: 1.04 ratio         PTT - ( 19 Sep 2018 07:30 )  PTT:30.7 sec  Urinalysis Basic - ( 17 Sep 2018 23:19 )    Color: Yellow / Appearance: Clear / S.010 / pH: x  Gluc: x / Ketone: Negative  / Bili: Negative / Urobili: Negative   Blood: x / Protein: Negative / Nitrite: Negative   Leuk Esterase: Negative / RBC: x / WBC x   Sq Epi: x / Non Sq Epi: x / Bacteria: x      CAPILLARY BLOOD GLUCOSE            RADIOLOGY & ADDITIONAL TESTS:       Plan of care was discussed with patient and /or primary care giver; all questions and concerns were addressed and care was aligned with patient's wishes.

## 2018-09-19 NOTE — PROGRESS NOTE ADULT - ASSESSMENT
84yo AO X3 M from home, lives alone, with PMH of GERD, HLD, BPH, single unilateral lt. kidney and PSH of cholecystectomy and Cataract surgery presented to ED c/o worsening weakness and confusion for 2 weeks.    ED course: Vitals: BP: 131/98 HR: 85  RR: 16 T: 98.1  SaO2: 96  Labs significant for Na 133, Cr 1.74  CT head negative for acute events.  EKG shows NSR @69 bpm with RBBB    He is admitted to work up for dementia.

## 2018-09-19 NOTE — PROGRESS NOTE ADULT - ATTENDING COMMENTS
Patient seen and examined; Agree with PGY2  A/P above with editing as needed. Patient seen and examined earlier this afternoon with daughter at bedside; Agree with PGY2  A/P above with editing as needed. Discussed with Dr. Gonzáles    Patient is very drowsy and barely  arousable with tactile stimuli. Patient was agitated last night and was given Ativan by on call team. Opened eyes after daughter called multiple times and with tactile stimulation. No fever or cough.     Vital Signs Last 24 Hrs  T(C): 36.4 (19 Sep 2018 13:58), Max: 36.9 (18 Sep 2018 20:55)  T(F): 97.6 (19 Sep 2018 13:58), Max: 98.4 (18 Sep 2018 20:55)  HR: 71 (19 Sep 2018 13:58) (71 - 81)  BP: 146/70 (19 Sep 2018 13:58) (108/72 - 146/70)  RR: 18 (19 Sep 2018 13:58) (17 - 18)  SpO2: 93% (19 Sep 2018 13:58) (93% - 98%)    P/E;  Neuro: as above;   CVS: S1S2 present, Regular  Resp: BLAE+, No wheeze   GI: Soft, BS+, NT, ND  Extr: No edema or calf tenderness    Lab:                        11.5   8.3   )-----------( 227      ( 19 Sep 2018 07:30 )             34.6   09-19    139  |  108  |  17  ----------------------------<  92  4.0   |  22  |  1.32<H>    Ca    8.5      19 Sep 2018 07:30  Phos  2.6     09-19  Mg     2.0     09-19    TPro  6.0  /  Alb  2.8<L>  /  TBili  0.7  /  DBili  x   /  AST  25  /  ALT  22  /  AlkPhos  60  09-19    D/D:  Encephalopathy Metabolic likely due to Sedative  LORENZO resolving likely due to poor oral intake  Dementia with agitation and confusion  Dehydration and Hypovolemic Hyponatremia improved  Hx Depression with Anxiety  Hx Hyperlipidemia    Plan:  Keep NPO; Aspiration precautions; HOB 45 degree  IV Fluids   Will get Neuro and Psych evaluation in AM once patient able to co-operate with exam  If continues to be sedated, will repeat CT  AVOID ANY FURTHER ATIVAN   MAY USE HALDOL AS NEEDED IF SEVERE AGITATION;  Patient was on Seroquel in the past 50 mg HS, will give 25 mg HS PRN if need be.  DVT prophylaxis    Discussed with daughter at Bedside findings, sedated due to drug effect in the setting of Renal Insufficiency and further plan of care  She eventually plans to take patient back to [place]  Will need another 48 hrs before final decision could be made on disposition if patient could be discharged home  Discussed with RN and PGY 2 Dr. Gonzáles

## 2018-09-19 NOTE — PROGRESS NOTE ADULT - PROBLEM SELECTOR PLAN 3
- Na 133, likely hypovolemic hyponatremia in the setting of decreased PO intake  - c/w IVF and encourage PO intake/hydration  - FU BMP - Na 133, likely hypovolemic hyponatremia in the setting of decreased PO intake  - c/w IVF and encourage PO intake/hydration  - currently resolved

## 2018-09-19 NOTE — PROGRESS NOTE ADULT - PROBLEM SELECTOR PLAN 5
- on quetiapine 50mg at bedtime and paroxetine 40mg morning and 20mg evening -c/w paroxetine 40mg morning and 20mg evening  Seroquel 25 mg Qhs for agitation   No ativan

## 2018-09-20 LAB
24R-OH-CALCIDIOL SERPL-MCNC: 36.8 NG/ML — SIGNIFICANT CHANGE UP (ref 30–80)
ANION GAP SERPL CALC-SCNC: 7 MMOL/L — SIGNIFICANT CHANGE UP (ref 5–17)
BUN SERPL-MCNC: 19 MG/DL — HIGH (ref 7–18)
CALCIUM SERPL-MCNC: 8.2 MG/DL — LOW (ref 8.4–10.5)
CHLORIDE SERPL-SCNC: 110 MMOL/L — HIGH (ref 96–108)
CO2 SERPL-SCNC: 22 MMOL/L — SIGNIFICANT CHANGE UP (ref 22–31)
CREAT SERPL-MCNC: 1.36 MG/DL — HIGH (ref 0.5–1.3)
GLUCOSE SERPL-MCNC: 130 MG/DL — HIGH (ref 70–99)
MAGNESIUM SERPL-MCNC: 1.9 MG/DL — SIGNIFICANT CHANGE UP (ref 1.6–2.6)
PHOSPHATE SERPL-MCNC: 2 MG/DL — LOW (ref 2.5–4.5)
POTASSIUM SERPL-MCNC: 3.8 MMOL/L — SIGNIFICANT CHANGE UP (ref 3.5–5.3)
POTASSIUM SERPL-SCNC: 3.8 MMOL/L — SIGNIFICANT CHANGE UP (ref 3.5–5.3)
SODIUM SERPL-SCNC: 139 MMOL/L — SIGNIFICANT CHANGE UP (ref 135–145)

## 2018-09-20 PROCEDURE — 99233 SBSQ HOSP IP/OBS HIGH 50: CPT | Mod: GC

## 2018-09-20 RX ORDER — ACETAMINOPHEN 500 MG
650 TABLET ORAL EVERY 6 HOURS
Qty: 0 | Refills: 0 | Status: DISCONTINUED | OUTPATIENT
Start: 2018-09-20 | End: 2018-09-22

## 2018-09-20 RX ADMIN — FINASTERIDE 5 MILLIGRAM(S): 5 TABLET, FILM COATED ORAL at 11:58

## 2018-09-20 RX ADMIN — BRIMONIDINE TARTRATE 1 DROP(S): 2 SOLUTION/ DROPS OPHTHALMIC at 17:28

## 2018-09-20 RX ADMIN — DORZOLAMIDE HYDROCHLORIDE TIMOLOL MALEATE 1 DROP(S): 20; 5 SOLUTION/ DROPS OPHTHALMIC at 05:43

## 2018-09-20 RX ADMIN — HEPARIN SODIUM 5000 UNIT(S): 5000 INJECTION INTRAVENOUS; SUBCUTANEOUS at 05:43

## 2018-09-20 RX ADMIN — Medication 650 MILLIGRAM(S): at 13:00

## 2018-09-20 RX ADMIN — HEPARIN SODIUM 5000 UNIT(S): 5000 INJECTION INTRAVENOUS; SUBCUTANEOUS at 13:02

## 2018-09-20 RX ADMIN — Medication 40 MILLIGRAM(S): at 11:58

## 2018-09-20 RX ADMIN — Medication 20 MILLIGRAM(S): at 22:12

## 2018-09-20 RX ADMIN — DORZOLAMIDE HYDROCHLORIDE TIMOLOL MALEATE 1 DROP(S): 20; 5 SOLUTION/ DROPS OPHTHALMIC at 17:26

## 2018-09-20 RX ADMIN — Medication 1 DROP(S): at 19:39

## 2018-09-20 RX ADMIN — PANTOPRAZOLE SODIUM 40 MILLIGRAM(S): 20 TABLET, DELAYED RELEASE ORAL at 05:44

## 2018-09-20 RX ADMIN — BRIMONIDINE TARTRATE 1 DROP(S): 2 SOLUTION/ DROPS OPHTHALMIC at 05:43

## 2018-09-20 RX ADMIN — Medication 1 DROP(S): at 12:00

## 2018-09-20 RX ADMIN — HEPARIN SODIUM 5000 UNIT(S): 5000 INJECTION INTRAVENOUS; SUBCUTANEOUS at 22:10

## 2018-09-20 RX ADMIN — Medication 650 MILLIGRAM(S): at 12:28

## 2018-09-20 RX ADMIN — SIMVASTATIN 40 MILLIGRAM(S): 20 TABLET, FILM COATED ORAL at 22:11

## 2018-09-20 NOTE — CONSULT NOTE ADULT - PROBLEM SELECTOR RECOMMENDATION 9
Weakness and confusion multifactorial most likely d/t recent UTI and deconditioning  Exam inconsistent w/ Parkinsons  PT

## 2018-09-20 NOTE — PHYSICAL THERAPY INITIAL EVALUATION ADULT - PASSIVE RANGE OF MOTION EXAMINATION, REHAB EVAL
bilateral lower extremity Passive ROM was WFL (within functional limits)/bilateral upper extremity Passive ROM was WFL (within functional limits)/Left shoulder flexion 130 degrees, patient without c/o pain.

## 2018-09-20 NOTE — CONSULT NOTE ADULT - SUBJECTIVE AND OBJECTIVE BOX
Seen w/ dtr present who gave majority of history.  Dtr states that pt has been hallucinating for the past 2 days.  Also, states that pt has been "getting mixed up" for the past month.  Denies jerking movements or shuffling gait.  Denies LOC or head trauma.         PAST MEDICAL & SURGICAL HISTORY:  GERD (gastroesophageal reflux disease)  Hyperlipemia  GERD (gastroesophageal reflux disease)  Other hyperlipidemia  Other glaucoma of both eyes  History of cholecystectomy  History of cataract surgery  History of cholecystectomy      FAMILY HISTORY:  No pertinent family history in first degree relatives  No pertinent family history in first degree relatives        Social Hx:  Nonsmoker, no drug or alcohol use    MEDICATIONS  (STANDING):  brimonidine 0.2% Ophthalmic Solution 1 Drop(s) Both EYES two times a day  dorzolamide 2%/timolol 0.5% Ophthalmic Solution 1 Drop(s) Both EYES two times a day  finasteride 5 milliGRAM(s) Oral daily  fluticasone propionate 50 MICROgram(s)/spray Nasal Spray 1 Spray(s) Both Nostrils daily  heparin  Injectable 5000 Unit(s) SubCutaneous every 8 hours  influenza   Vaccine 0.5 milliLiter(s) IntraMuscular once  ketorolac 0.5% Ophthalmic Solution 1 Drop(s) Both EYES daily  ondansetron Injectable 4 milliGRAM(s) IV Push once  pantoprazole    Tablet 40 milliGRAM(s) Oral before breakfast  PARoxetine 20 milliGRAM(s) Oral at bedtime  PARoxetine 40 milliGRAM(s) Oral <User Schedule>  simvastatin 40 milliGRAM(s) Oral at bedtime  sodium chloride 0.9%. 1000 milliLiter(s) (80 mL/Hr) IV Continuous <Continuous>       Allergies    No Known Allergies    Intolerances      Vital Signs Last 24 Hrs  T(C): 36.8 (20 Sep 2018 13:28), Max: 36.8 (20 Sep 2018 13:28)  T(F): 98.3 (20 Sep 2018 13:28), Max: 98.3 (20 Sep 2018 13:28)  HR: 75 (20 Sep 2018 15:32) (64 - 75)  BP: 120/72 (20 Sep 2018 15:32) (100/52 - 146/61)  BP(mean): --  RR: 17 (20 Sep 2018 13:28) (17 - 18)  SpO2: 95% (20 Sep 2018 15:32) (95% - 98%)        Constitutional: awake and alert.  HEENT: PERRLA, EOMI,   Neck: Supple.  Respiratory: Breath sounds are clear bilaterally  Cardiovascular: S1 and S2, regular / irregular rhythm  Gastrointestinal: soft, nontender  Extremities:  no edema  Vascular: Carotid Bruit - no  Musculoskeletal: no joint swelling/tenderness, no abnormal movements  Skin: No rashes    Neurological exam:  HF: A x O x 2. Appropriately interactive, normal affect. Speech fluent, No Aphasia or paraphasic errors.   CN: AMINTA  Motor: Tone normal, no tremor  Sens: Intact to light touch  Reflexes: Symmetric and normal . BJ 1+, BR 1+, KJ 2+, AJ 2+, downgoing toes b/l  Coord:  VICKY intact   Gait/Balance: Normal            Labs:                        11.5   8.3   )-----------( 227      ( 19 Sep 2018 07:30 )             34.6     09-20    139  |  110<H>  |  19<H>  ----------------------------<  130<H>  3.8   |  22  |  1.36<H>    Ca    8.2<L>      20 Sep 2018 10:15  Phos  2.0     09-20  Mg     1.9     09-20    TPro  6.0  /  Alb  2.8<L>  /  TBili  0.7  /  DBili  x   /  AST  25  /  ALT  22  /  AlkPhos  60  09-19    09-19 [id number] 5.8  09-18 [id number] 5.7    09-19 Chol 158 LDL 88 HDL 58 Trig 58  PT/INR - ( 19 Sep 2018 07:30 )   PT: 11.4 sec;   INR: 1.04 ratio         PTT - ( 19 Sep 2018 07:30 )  PTT:30.7 sec    Radiology report:  -< from: CT Head No Cont (09.17.18 @ 23:38) >  o acute intracranial hemorrhage or mass effect.    Paranasal sinus disease as described, with complete opacification of the   left sphenoid and maxillary sinuses.    < end of copied text >

## 2018-09-20 NOTE — PROGRESS NOTE ADULT - PROBLEM SELECTOR PLAN 1
-p/w lethargy and fall, orthostatics positive, likely 2/2 poor PO intake  - c/w paroxetine home dose and Seroquel 25 mg PRN   - UA normal, Cx neg  - CT head normal  - c/w IVF and encourage PO intake/hydration  - PT and SW consult  likely metabolic Vs worsening of dementia   - psych and neurology consulted   - avoid ativan for agitation   - haldol prn 0.5 mg for agitation   - aspiration and fall precautions  - normal TSH, Vit B12, neg syphilis

## 2018-09-20 NOTE — PROGRESS NOTE ADULT - ATTENDING COMMENTS
Patient seen and examined earlier this morning with daughter at bedside; Agree with PGY2  A/P above with editing as needed. Discussed with Dr. Gonzáles    patient doing much better; OOB to chair; almost at baseline without hallucinations; understands he is in [place] and probably will need to go back and live with Daughter in [place]    Vital Signs Last 24 Hrs  T(C): 36.8 (20 Sep 2018 13:28), Max: 36.8 (20 Sep 2018 13:28)  T(F): 98.3 (20 Sep 2018 13:28), Max: 98.3 (20 Sep 2018 13:28)  HR: 75 (20 Sep 2018 15:32) (64 - 75)  BP: 120/72 (20 Sep 2018 15:32) (100/52 - 146/61)  RR: 17 (20 Sep 2018 13:28) (17 - 18)  SpO2: 95% (20 Sep 2018 15:32) (95% - 98%)    P/E;  Neuro: AAO x2.5; No focal deficits; No tremulousness noted  CVS: S1S2 present, Regular  Resp: BLAE+, No wheeze   GI: Soft, BS+, NT, ND  Extr: No edema or calf tenderness    Lab:  09-20    139  |  110<H>  |  19<H>  ----------------------------<  130<H>  3.8   |  22  |  1.36<H>    Ca    8.2<L>      20 Sep 2018 10:15  Phos  2.0     09-20  Mg     1.9     09-20    D/D:  Encephalopathy Metabolic likely due to Sedation resolved  LORENZO resolving likely due to poor oral intake  Dementia with agitation and confusion stable  Dehydration and Hypovolemic Hyponatremia improved  Hx Depression with Anxiety  Hx Hyperlipidemia    Plan:  Resume diet; Ate well as per daughter; Continue IV Fluids for 24 hrs more  Pt reevaluation  Neuro eval appreciated; Doubts any evidence of Parkinson  Will get Psych eval to adjust meds  AVOID ANY FURTHER ATIVAN   MAY USE HALDOL AS NEEDED IF SEVERE AGITATION;  Continue Seroquel 25 mg HS as needed for now.   DVT prophylaxis  PT eval currently recommends STR but room for improvement as he is recovering from Sedation and Encephalopathy    Discussed with daughter at Bedside findings at length and plan of care  Daughter considering placing in an Assisted Living in [place] to provide freedom and space  Patient understands and willing     Discussed with RN  Discussed with PGY 2 Dr. Gonzáles  Will discuss with PT to see if clinical improvement and can be safe to go home with Home PT as STR will delay patient plan for moving to [place]  Discussed with RN and PGY 2 Dr. Gonzáles

## 2018-09-20 NOTE — PHYSICAL THERAPY INITIAL EVALUATION ADULT - GENERAL OBSERVATIONS, REHAB EVAL
Pt. found supine in bed, IV access right UE, NAD, alert, pleasant and cooperative. Therapist notes tremor in Left UE with movement.

## 2018-09-20 NOTE — PROGRESS NOTE ADULT - PROBLEM SELECTOR PLAN 2
-p/w fall from bed on left side of body  - shoulder and Xip showed no acute fracture   fall precaution  - patient was orthostatic positive on admission   -likely due to dehydration and hypotension    - fall precautions  - PT eval

## 2018-09-20 NOTE — PHYSICAL THERAPY INITIAL EVALUATION ADULT - CRITERIA FOR SKILLED THERAPEUTIC INTERVENTIONS
impairments found/therapy frequency/anticipated equipment needs at discharge/risk reduction/prevention/rehab potential/predicted duration of therapy intervention/anticipated discharge recommendation/functional limitations in following categories

## 2018-09-20 NOTE — PHYSICAL THERAPY INITIAL EVALUATION ADULT - LIVES WITH, PROFILE
alone/Live alone in apartment with elevator access and no stairs to enter. Daughter wishes to take patient to home in May to live in private house with her and , with 6 low stairs to enter.

## 2018-09-20 NOTE — PHYSICAL THERAPY INITIAL EVALUATION ADULT - ACTIVE RANGE OF MOTION EXAMINATION, REHAB EVAL
except for Left shoulder flexion to 45 degress before pt c/o pain in biceps./bilateral upper extremity Active ROM was WFL (within functional limits)/bilateral  lower extremity Active ROM was WFL (within functional limits)

## 2018-09-20 NOTE — PROGRESS NOTE ADULT - SUBJECTIVE AND OBJECTIVE BOX
PGY2  Note discussed with supervising  attending    Patient is a 83y old  Male who presents with a chief complaint of Weakness and confusion (19 Sep 2018 12:21)      INTERVAL HPI/OVERNIGHT EVENTS: Patient seen and examined at bed side, patient is Awake and alert, having breakfast, offers no new complaints; current symptoms resolving    MEDICATIONS  (STANDING):  brimonidine 0.2% Ophthalmic Solution 1 Drop(s) Both EYES two times a day  dorzolamide 2%/timolol 0.5% Ophthalmic Solution 1 Drop(s) Both EYES two times a day  finasteride 5 milliGRAM(s) Oral daily  fluticasone propionate 50 MICROgram(s)/spray Nasal Spray 1 Spray(s) Both Nostrils daily  heparin  Injectable 5000 Unit(s) SubCutaneous every 8 hours  influenza   Vaccine 0.5 milliLiter(s) IntraMuscular once  ketorolac 0.5% Ophthalmic Solution 1 Drop(s) Both EYES daily  ondansetron Injectable 4 milliGRAM(s) IV Push once  pantoprazole    Tablet 40 milliGRAM(s) Oral before breakfast  PARoxetine 20 milliGRAM(s) Oral at bedtime  PARoxetine 40 milliGRAM(s) Oral <User Schedule>  simvastatin 40 milliGRAM(s) Oral at bedtime  sodium chloride 0.9%. 1000 milliLiter(s) (80 mL/Hr) IV Continuous <Continuous>    MEDICATIONS  (PRN):  QUEtiapine 25 milliGRAM(s) Oral at bedtime PRN agitation      __________________________________________________  REVIEW OF SYSTEMS:    CONSTITUTIONAL: No fever,   EYES: no acute visual disturbances  NECK: No pain or stiffness  RESPIRATORY: No cough; No shortness of breath  CARDIOVASCULAR: No chest pain, no palpitations  GASTROINTESTINAL: No pain. No nausea or vomiting; No diarrhea   NEUROLOGICAL: No headache or numbness, no tremors  MUSCULOSKELETAL: No joint pain, no muscle pain  GENITOURINARY: no dysuria, no frequency, no hesitancy  PSYCHIATRY: no depression , no anxiety  ALL OTHER  ROS negative        Vital Signs Last 24 Hrs  T(C): 36.7 (20 Sep 2018 05:09), Max: 36.7 (20 Sep 2018 05:09)  T(F): 98.1 (20 Sep 2018 05:09), Max: 98.1 (20 Sep 2018 05:09)  HR: 71 (20 Sep 2018 05:09) (64 - 71)  BP: 146/61 (20 Sep 2018 05:09) (129/57 - 146/70)  BP(mean): --  RR: 18 (20 Sep 2018 05:09) (18 - 18)  SpO2: 98% (20 Sep 2018 05:09) (93% - 98%)    ________________________________________________  PHYSICAL EXAM:  GENERAL: NAD  HEENT: Normocephalic;  conjunctivae and sclerae clear; moist mucous membranes;   NECK : supple  CHEST/LUNG: Clear to auscultation bilaterally with good air entry   HEART: S1 S2  regular; no murmurs, gallops or rubs  ABDOMEN: Soft, Nontender, Nondistended; Bowel sounds present  EXTREMITIES: no cyanosis; no edema; no calf tenderness  SKIN: warm and dry; no rash  NERVOUS SYSTEM:  AAOx3     _________________________________________________  LABS:                        11.5   8.3   )-----------( 227      ( 19 Sep 2018 07:30 )             34.6     09-19    139  |  108  |  17  ----------------------------<  92  4.0   |  22  |  1.32<H>    Ca    8.5      19 Sep 2018 07:30  Phos  2.6     09-19  Mg     2.0     09-19    TPro  6.0  /  Alb  2.8<L>  /  TBili  0.7  /  DBili  x   /  AST  25  /  ALT  22  /  AlkPhos  60  09-19    PT/INR - ( 19 Sep 2018 07:30 )   PT: 11.4 sec;   INR: 1.04 ratio         PTT - ( 19 Sep 2018 07:30 )  PTT:30.7 sec    CAPILLARY BLOOD GLUCOSE            RADIOLOGY & ADDITIONAL TESTS:    Care Discussed with Consultants : YES    Plan of care was discussed with patient and /or primary care giver; all questions and concerns were addressed and care was aligned with patient's wishes.

## 2018-09-20 NOTE — PROGRESS NOTE ADULT - PROBLEM SELECTOR PLAN 3
- Na 133, likely hypovolemic hyponatremia in the setting of decreased PO intake  - c/w IVF and encourage PO intake/hydration  - currently resolved

## 2018-09-20 NOTE — PROGRESS NOTE ADULT - PROBLEM SELECTOR PLAN 4
Cr 1.74, BUN 21  Likely pre-renal in the setting of decreased PO, and has unilateral left kidney  c/w IVF and encourage PO intake/hydration   Cr 1.3   improving   c/w iV fluids

## 2018-09-20 NOTE — PHYSICAL THERAPY INITIAL EVALUATION ADULT - GAIT DEVIATIONS NOTED, PT EVAL
decreased velocity of limb motion/decreased stride length/decreased step length/decreased weight-shifting ability/increased stride width/decreased julia/increased time in double stance

## 2018-09-21 ENCOUNTER — TRANSCRIPTION ENCOUNTER (OUTPATIENT)
Age: 83
End: 2018-09-21

## 2018-09-21 DIAGNOSIS — F02.81 DEMENTIA IN OTHER DISEASES CLASSIFIED ELSEWHERE, UNSPECIFIED SEVERITY, WITH BEHAVIORAL DISTURBANCE: ICD-10-CM

## 2018-09-21 PROCEDURE — 99233 SBSQ HOSP IP/OBS HIGH 50: CPT | Mod: GC

## 2018-09-21 RX ORDER — MEMANTINE HYDROCHLORIDE 10 MG/1
5 TABLET ORAL
Qty: 0 | Refills: 0 | Status: DISCONTINUED | OUTPATIENT
Start: 2018-09-21 | End: 2018-09-22

## 2018-09-21 RX ORDER — QUETIAPINE FUMARATE 200 MG/1
0.5 TABLET, FILM COATED ORAL
Qty: 30 | Refills: 0 | OUTPATIENT
Start: 2018-09-21 | End: 2018-10-20

## 2018-09-21 RX ORDER — LATANOPROST 0.05 MG/ML
1 SOLUTION/ DROPS OPHTHALMIC; TOPICAL
Qty: 0 | Refills: 0 | COMMUNITY

## 2018-09-21 RX ORDER — MEMANTINE HYDROCHLORIDE 10 MG/1
1 TABLET ORAL
Qty: 60 | Refills: 0 | OUTPATIENT
Start: 2018-09-21 | End: 2018-10-20

## 2018-09-21 RX ORDER — NEPAFENAC 3 MG/ML
1 SUSPENSION OPHTHALMIC
Qty: 0 | Refills: 0 | COMMUNITY

## 2018-09-21 RX ORDER — LOTEPREDNOL ETABONATE 2 MG/ML
1 SUSPENSION/ DROPS OPHTHALMIC
Qty: 0 | Refills: 0 | COMMUNITY

## 2018-09-21 RX ORDER — SIMVASTATIN 20 MG/1
1 TABLET, FILM COATED ORAL
Qty: 30 | Refills: 0 | OUTPATIENT
Start: 2018-09-21 | End: 2018-10-20

## 2018-09-21 RX ORDER — ERYTHROMYCIN BASE 5 MG/GRAM
1 OINTMENT (GRAM) OPHTHALMIC (EYE)
Qty: 0 | Refills: 0 | COMMUNITY

## 2018-09-21 RX ORDER — FINASTERIDE 5 MG/1
1 TABLET, FILM COATED ORAL
Qty: 0 | Refills: 0 | COMMUNITY
Start: 2018-09-21

## 2018-09-21 RX ORDER — OMEPRAZOLE 10 MG/1
1 CAPSULE, DELAYED RELEASE ORAL
Qty: 30 | Refills: 0 | OUTPATIENT
Start: 2018-09-21 | End: 2018-10-20

## 2018-09-21 RX ORDER — QUETIAPINE FUMARATE 200 MG/1
1 TABLET, FILM COATED ORAL
Qty: 0 | Refills: 0 | COMMUNITY

## 2018-09-21 RX ORDER — QUETIAPINE FUMARATE 200 MG/1
12.5 TABLET, FILM COATED ORAL
Qty: 0 | Refills: 0 | Status: DISCONTINUED | OUTPATIENT
Start: 2018-09-21 | End: 2018-09-22

## 2018-09-21 RX ORDER — TAMSULOSIN HYDROCHLORIDE 0.4 MG/1
0.4 CAPSULE ORAL AT BEDTIME
Qty: 0 | Refills: 0 | Status: DISCONTINUED | OUTPATIENT
Start: 2018-09-21 | End: 2018-09-22

## 2018-09-21 RX ORDER — FLUTICASONE PROPIONATE 50 MCG
1 SPRAY, SUSPENSION NASAL
Qty: 0 | Refills: 0 | COMMUNITY
Start: 2018-09-21

## 2018-09-21 RX ORDER — FINASTERIDE 5 MG/1
1 TABLET, FILM COATED ORAL
Qty: 30 | Refills: 0 | OUTPATIENT
Start: 2018-09-21 | End: 2018-10-20

## 2018-09-21 RX ORDER — TAMSULOSIN HYDROCHLORIDE 0.4 MG/1
1 CAPSULE ORAL
Qty: 30 | Refills: 0 | OUTPATIENT
Start: 2018-09-21 | End: 2018-10-20

## 2018-09-21 RX ADMIN — Medication 1 DROP(S): at 06:44

## 2018-09-21 RX ADMIN — HEPARIN SODIUM 5000 UNIT(S): 5000 INJECTION INTRAVENOUS; SUBCUTANEOUS at 22:03

## 2018-09-21 RX ADMIN — PANTOPRAZOLE SODIUM 40 MILLIGRAM(S): 20 TABLET, DELAYED RELEASE ORAL at 06:42

## 2018-09-21 RX ADMIN — Medication 1 DROP(S): at 18:08

## 2018-09-21 RX ADMIN — Medication 1 DROP(S): at 00:00

## 2018-09-21 RX ADMIN — Medication 1 DROP(S): at 13:35

## 2018-09-21 RX ADMIN — Medication 1 DROP(S): at 13:38

## 2018-09-21 RX ADMIN — Medication 40 MILLIGRAM(S): at 11:00

## 2018-09-21 RX ADMIN — INFLUENZA VIRUS VACCINE 0.5 MILLILITER(S): 15; 15; 15; 15 SUSPENSION INTRAMUSCULAR at 17:26

## 2018-09-21 RX ADMIN — SODIUM CHLORIDE 80 MILLILITER(S): 9 INJECTION INTRAMUSCULAR; INTRAVENOUS; SUBCUTANEOUS at 06:44

## 2018-09-21 RX ADMIN — DORZOLAMIDE HYDROCHLORIDE TIMOLOL MALEATE 1 DROP(S): 20; 5 SOLUTION/ DROPS OPHTHALMIC at 18:10

## 2018-09-21 RX ADMIN — BRIMONIDINE TARTRATE 1 DROP(S): 2 SOLUTION/ DROPS OPHTHALMIC at 17:25

## 2018-09-21 RX ADMIN — BRIMONIDINE TARTRATE 1 DROP(S): 2 SOLUTION/ DROPS OPHTHALMIC at 06:43

## 2018-09-21 RX ADMIN — DORZOLAMIDE HYDROCHLORIDE TIMOLOL MALEATE 1 DROP(S): 20; 5 SOLUTION/ DROPS OPHTHALMIC at 06:43

## 2018-09-21 RX ADMIN — Medication 1 SPRAY(S): at 13:34

## 2018-09-21 RX ADMIN — HEPARIN SODIUM 5000 UNIT(S): 5000 INJECTION INTRAVENOUS; SUBCUTANEOUS at 06:43

## 2018-09-21 RX ADMIN — SIMVASTATIN 40 MILLIGRAM(S): 20 TABLET, FILM COATED ORAL at 22:03

## 2018-09-21 RX ADMIN — FINASTERIDE 5 MILLIGRAM(S): 5 TABLET, FILM COATED ORAL at 13:34

## 2018-09-21 RX ADMIN — MEMANTINE HYDROCHLORIDE 5 MILLIGRAM(S): 10 TABLET ORAL at 17:25

## 2018-09-21 RX ADMIN — HEPARIN SODIUM 5000 UNIT(S): 5000 INJECTION INTRAVENOUS; SUBCUTANEOUS at 13:37

## 2018-09-21 RX ADMIN — TAMSULOSIN HYDROCHLORIDE 0.4 MILLIGRAM(S): 0.4 CAPSULE ORAL at 22:05

## 2018-09-21 NOTE — DISCHARGE NOTE ADULT - SECONDARY DIAGNOSIS.
Depression Dementia GERD (gastroesophageal reflux disease) Renal insufficiency Other glaucoma of both eyes BPH (benign prostatic hyperplasia)

## 2018-09-21 NOTE — BEHAVIORAL HEALTH ASSESSMENT NOTE - SUMMARY
This is a 84 y/o,w/m,from home lives alone with PMH of GERD,HLD,HTN was admitted with s/p mechanical fall,weakness.  Patient was interviewed,chart was reviewed,case was discussed with MD.  Patient stated:" I am in [place]"."They are not letting me go home".  He is unable to provide his PMH.  Patient is on 60mg Paxil qd.  Patient is awake,minimally verbal,behaviorally unpredictable.  Speech is goal directed,illogical at times.  Denied feeling depressed.Deniede suicidal thought,plan,ideation.  Denied a/v hallucinations.  Memory and cognition-limited.I&J-limited.Appetite-"OK".Sleep-"not good".

## 2018-09-21 NOTE — DISCHARGE NOTE ADULT - MEDICATION SUMMARY - MEDICATIONS TO STOP TAKING
I will STOP taking the medications listed below when I get home from the hospital:    omeprazole 20 mg oral delayed release capsule  -- 1 cap(s) by mouth once a day    Lotemax 0.5% ophthalmic gel  -- 1 drop(s) to each affected eye 4 times a day    BRIMONIDINE TARTRATE .2 % SOLN    DORZOL/TIMOL SOL 22.3-6.8    LATANOPROST .005 % SOLN    OMEPRAZOLE 40 MG CPDR    erythromycin 0.5% ophthalmic ointment  -- 1 application to each affected eye 4 times a day to the left eye    Ilevro 0.3% ophthalmic suspension  -- 1 drop(s) to each affected eye once a day    PARoxetine 20 mg oral tablet  -- 1 tab(s) by mouth once a day

## 2018-09-21 NOTE — DISCHARGE NOTE ADULT - CARE PLAN
Principal Discharge DX:	Encephalopathy  Goal:	To treat the underlying medical condition  Assessment and plan of treatment:	You presented to hospital with agitation and confusion. Admitted with Encephalopathy likely metabolic Vs worsening of Dementia. You CT head was negative for acute pathology, Urine cultures showed no growth to date. You were dehydrated on admission, treated with IV fluids. Neurology and psychiatry evaluated you. As per neurology you have no signs of parkinson's disease. Psychiatry re- adjusted you medication. Take Paxil 40 mg daily in am and Seroquel 12.5 mg twice a day. Namenda 5 mg BID was added for early dementia. You were advised for Short term rehab but family wants to take with them to home. Follow up with your primary care upon discharge in 1-2 weeks.  Secondary Diagnosis:	Depression  Goal:	To stable the Mood  Assessment and plan of treatment:	You presented with confusion and agitation Likely due to underlying depression. Continue with Paxil and Seroquel as prescribed. Follow up with your primary care physician upon discharge  Secondary Diagnosis:	Dementia  Goal:	To prevent the worsening of memory  Assessment and plan of treatment:	You might have age related early onset of dementia. Reversible causes of dementia were ruled out. continue with Namenda 5 mg twice a day. Maintain aspiration and fall precautions.  Secondary Diagnosis:	GERD (gastroesophageal reflux disease)  Secondary Diagnosis:	Renal insufficiency  Secondary Diagnosis:	Other glaucoma of both eyes Principal Discharge DX:	Encephalopathy  Goal:	To treat the underlying medical condition  Assessment and plan of treatment:	You presented to hospital with agitation and confusion. Admitted with Encephalopathy likely metabolic Vs worsening of Dementia. You CT head was negative for acute pathology, Urine cultures showed no growth to date. You were dehydrated on admission, treated with IV fluids. Neurology and psychiatry evaluated you. As per neurology you have no signs of parkinson's disease. Psychiatry re- adjusted you medication. Take Paxil 40 mg daily in am and Seroquel 12.5 mg twice a day. Namenda 5 mg BID was added for early dementia. You were advised for Short term rehab but family wants to take with them to home. Follow up with your primary care upon discharge in 1-2 weeks.  Secondary Diagnosis:	Depression  Goal:	To stable the Mood  Assessment and plan of treatment:	You presented with confusion and agitation Likely due to underlying depression. Continue with Paxil and Seroquel as prescribed. Follow up with your primary care physician upon discharge  Secondary Diagnosis:	Dementia  Goal:	To prevent the worsening of memory  Assessment and plan of treatment:	You might have age related early onset of dementia. Reversible causes of dementia were ruled out. continue with Namenda 5 mg twice a day. Maintain aspiration and fall precautions.  Secondary Diagnosis:	GERD (gastroesophageal reflux disease)  Goal:	Prevent reflux and complications.  Assessment and plan of treatment:	Continue Omeprazole; Consider trial of tapering off PPIs  Secondary Diagnosis:	Renal insufficiency  Goal:	Prevent dehydration  Assessment and plan of treatment:	LORENZO due to dehydration and poor oral intake resolved with hydration.   Encourage oral fluids.  Secondary Diagnosis:	Other glaucoma of both eyes  Assessment and plan of treatment:	Continue eye drops as before  Secondary Diagnosis:	BPH (benign prostatic hyperplasia)  Goal:	Adequate urination  Assessment and plan of treatment:	You have a history of BPH and is on Finasteride. You did admit to going multiple times at night. We started you on Flomax and advise you to take it at night time to reduce Orthostatic hypotension and prevent falls. You are advised to sit for a few minutes at the edge of bed and then stand for a couple of mins before you walk. USE WALKER TO PROVIDE BETTER BALANCE WHILE WALKING. Principal Discharge DX:	Encephalopathy  Goal:	To treat the underlying medical condition  Assessment and plan of treatment:	You presented to hospital with agitation and confusion. Admitted with Encephalopathy likely metabolic Vs worsening of Dementia. Your CT head was negative for acute pathology, Urine cultures showed no growth to date. You were dehydrated on admission, treated with IV fluids. Neurology and psychiatry evaluated you. As per neurology you have no signs of parkinson's disease. Psychiatry re- adjusted you medication. Take Paxil 40 mg daily in am and Seroquel 12.5 mg twice a day. Namenda 5 mg BID was added for early dementia. You were advised for Short term rehab but family wants to take with them to home. Follow up with your primary care upon discharge in 1-2 weeks.  Secondary Diagnosis:	Depression  Goal:	To stable the Mood  Assessment and plan of treatment:	You presented with confusion and agitation Likely due to underlying depression. Continue with Paxil and Seroquel as prescribed. Follow up with your primary care physician upon discharge  Secondary Diagnosis:	Dementia  Goal:	To prevent the worsening of memory  Assessment and plan of treatment:	You might have age related early onset of dementia. Reversible causes of dementia were ruled out. continue with Namenda 5 mg twice a day. Maintain aspiration and fall precautions.  Secondary Diagnosis:	GERD (gastroesophageal reflux disease)  Goal:	Prevent reflux and complications.  Assessment and plan of treatment:	Continue Omeprazole; Consider trial of tapering off PPIs  Secondary Diagnosis:	Renal insufficiency  Goal:	Prevent dehydration  Assessment and plan of treatment:	LORENZO due to dehydration and poor oral intake resolved with hydration.   Encourage oral fluids.  Secondary Diagnosis:	Other glaucoma of both eyes  Assessment and plan of treatment:	Continue eye drops as before  Secondary Diagnosis:	BPH (benign prostatic hyperplasia)  Goal:	Adequate urination  Assessment and plan of treatment:	You have a history of BPH and is on Finasteride. You did admit to going multiple times at night. We started you on Flomax and advise you to take it at night time to reduce Orthostatic hypotension and prevent falls. You are advised to sit for a few minutes at the edge of bed and then stand for a couple of mins before you walk. USE WALKER TO PROVIDE BETTER BALANCE WHILE WALKING.

## 2018-09-21 NOTE — DISCHARGE NOTE ADULT - PLAN OF CARE
To treat the underlying medical condition You presented to hospital with agitation and confusion. Admitted with Encephalopathy likely metabolic Vs worsening of Dementia. You CT head was negative for acute pathology, Urine cultures showed no growth to date. You were dehydrated on admission, treated with IV fluids. Neurology and psychiatry evaluated you. As per neurology you have no signs of parkinson's disease. Psychiatry re- adjusted you medication. Take Paxil 40 mg daily in am and Seroquel 12.5 mg twice a day. Namenda 5 mg BID was added for early dementia. You were advised for Short term rehab but family wants to take with them to home. Follow up with your primary care upon discharge in 1-2 weeks. To stable the Mood You presented with confusion and agitation Likely due to underlying depression. Continue with Paxil and Seroquel as prescribed. Follow up with your primary care physician upon discharge To prevent the worsening of memory You might have age related early onset of dementia. Reversible causes of dementia were ruled out. continue with Namenda 5 mg twice a day. Maintain aspiration and fall precautions. Prevent reflux and complications. Continue Omeprazole; Consider trial of tapering off PPIs Prevent dehydration LORENZO due to dehydration and poor oral intake resolved with hydration.   Encourage oral fluids. Continue eye drops as before Adequate urination You have a history of BPH and is on Finasteride. You did admit to going multiple times at night. We started you on Flomax and advise you to take it at night time to reduce Orthostatic hypotension and prevent falls. You are advised to sit for a few minutes at the edge of bed and then stand for a couple of mins before you walk. USE WALKER TO PROVIDE BETTER BALANCE WHILE WALKING. You presented to hospital with agitation and confusion. Admitted with Encephalopathy likely metabolic Vs worsening of Dementia. Your CT head was negative for acute pathology, Urine cultures showed no growth to date. You were dehydrated on admission, treated with IV fluids. Neurology and psychiatry evaluated you. As per neurology you have no signs of parkinson's disease. Psychiatry re- adjusted you medication. Take Paxil 40 mg daily in am and Seroquel 12.5 mg twice a day. Namenda 5 mg BID was added for early dementia. You were advised for Short term rehab but family wants to take with them to home. Follow up with your primary care upon discharge in 1-2 weeks.

## 2018-09-21 NOTE — BEHAVIORAL HEALTH ASSESSMENT NOTE - HYGIENE
Problem: VTE, Risk for  Goal: # Absence of symptoms of venous thromboembolism  No signs or symptoms of DVT upon assessment    Problem: At Risk for Falls  Goal: # Patient does not fall  Outcome: Outcome Met, Continue evaluating goal progress toward completion  Physically pt is at baseline.        Fair

## 2018-09-21 NOTE — CHART NOTE - NSCHARTNOTEFT_GEN_A_CORE
Patient seen and examined earlier this morning with daughter at bedside; Agree with PGY2  A/P above with editing as needed. Discussed with Dr. Gonzáles    patient doing much better; OOB to chair; almost at baseline without hallucinations; understands he is in [place] and probably will need to go back and live with Daughter in [place]    Vital Signs Last 24 Hrs  T(C): 36.9 (21 Sep 2018 13:20), Max: 36.9 (20 Sep 2018 20:50)  T(F): 98.5 (21 Sep 2018 13:20), Max: 98.5 (21 Sep 2018 13:20)  HR: 73 (21 Sep 2018 13:20) (71 - 79)  BP: 115/72 (21 Sep 2018 13:20) (109/62 - 174/79)  RR: 17 (21 Sep 2018 13:20) (17 - 18)  SpO2: 95% (21 Sep 2018 13:20) (94% - 97%)    P/E;  Neuro: AAO x2.5; No focal deficits;   CVS: S1S2 present, Regular  Resp: BLAE+, No wheeze   GI: Soft, BS+, NT, ND  Extr: No edema or calf tenderness    Lab: No new      D/D:  Encephalopathy Metabolic likely due to Sedation resolved  LORENZO resolving likely due to poor oral intake  Dementia with agitation and confusion much improved  Dehydration and Hypovolemic Hyponatremia improved  Hx Depression with Anxiety  Hx Hyperlipidemia    Plan:  Patient tolerating diet;   Psych eval appreciated; meds adjusted;  Low dose Seroquel 12.5 mg twice daily, Decrease Paxil only 40 mg daily; Add Namenda   MAY USE HALDOL AS NEEDED IF SEVERE AGITATION;   DVT prophylaxis  PT eval currently recommends STR but daughter is planning to take patient to [place]; Son in law coming over and plan to take on Monday if patient is stable  Patient has clinically much improved;   Will watch tonight with adjusted dose of medications and D/C Home tomorrow    Discussed with daughter at Bedside findings at length and plan of care  Daughter considering placing in an Assisted Living in [place] if need be  Patient is     Discussed with RN  Discussed with PGY 2 Dr. Gonzáles    Discussed with RN and PGY 2 Dr. Gonzáles .

## 2018-09-21 NOTE — DISCHARGE NOTE ADULT - PATIENT PORTAL LINK FT
You can access the [facility] Portal, offered by [facility], by registering with the following website: [email or web address]

## 2018-09-21 NOTE — DISCHARGE NOTE ADULT - HOSPITAL COURSE
84yo AO X3 M from home, lives alone, with PMH of GERD, HLD, BPH, single unilateral lt. kidney and PSH of cholecystectomy and Cataract surgery presented to ED c/o weakness and confusion for 2 weeks. He is a poor historian, states he fell 2 weeks ago when he was trying to get off from bed and hurt his left hip and arm, denies any visual changes/N/V/ seizure like activity/loss of consciousness/headache. However, he states that since past few weeks he has not been able to take care of himself and is unable to walk like before. He also endorses urinary and stool incontinence. He also complaints of hand tremors, dizziness and decreased appetite.    History obtained from daughter over phone, she states he was living with her in [place] and moved to [place] on july, since august he has been having worsening confusion with difficulty taking his medications at right time. He had urine infection 1 week ago and was  given cipro by urgent care. She states she does not know if he took the antibiotic but he has not been doing good since then. She was informed about his condition by family friend who lives near him and she flew to [place] 2 days ago. She was planning to take him with her but saw his apartment was messed up with urine and feces all over and so she brought her here. She also states he was started on quetiapine and paroxetine about 1 year ago by his PCP.       ED course: Vitals: BP: 131/98 HR: 85  RR: 16 T: 98.1  SaO2: 96 Labs significant for Na 133, Cr 1.74 CT head negative for acute events. orthostatics positive EKG shows NSR @69 bpm with RBBB (18 Sep 2018 02:43) 82yo AO X3 M from home, lives alone, with PMH of GERD, HLD, BPH, single unilateral lt. kidney and PSH of cholecystectomy and Cataract surgery presented to ED c/o weakness and confusion for 2 weeks. He is a poor historian, states he fell 2 weeks ago when he was trying to get off from bed and hurt his left hip and arm, denies any visual changes/N/V/ seizure like activity/loss of consciousness/headache. However, he states that since past few weeks he has not been able to take care of himself and is unable to walk like before. He also endorses urinary and stool incontinence. He also complaints of hand tremors, dizziness and decreased appetite. History obtained from daughter over phone, she states he was living with her in [place] and moved to [place] on july, since august he has been having worsening confusion with difficulty taking his medications at right time. He had urine infection 1 week ago and was  given cipro by urgent care. She states she does not know if he took the antibiotic but he has not been doing good since then. She was informed about his condition by family friend who lives near him and she flew to [place] 2 days ago. She was planning to take him with her but saw his apartment was messed up with urine and feces all over and so she brought her here. She also states he was started on quetiapine and paroxetine about 1 year ago by his PCP. On 6 south patient seen and examined in NAD, w/u showed encephalopathy Metabolic likely due to Sedation resolved, LORENZO resolving likely due to poor oral intake, dementia with agitation and confusion much improved, dehydration hypovolemic hyponatremia improved. Patient seen and examined at bedside with no acute complaints. The medical plan and results were discussed with the patient throughout the hospital course. Patient is medically clear for discharge and is advised to follow up with his primary care physician within a week for continued medical care. Please see above and the medical records for additional information.

## 2018-09-21 NOTE — DISCHARGE NOTE ADULT - MEDICATION SUMMARY - MEDICATIONS TO CHANGE
I will SWITCH the dose or number of times a day I take the medications listed below when I get home from the hospital:    QUEtiapine 50 mg oral tablet, extended release  -- 1 tab(s) by mouth once a day (in the evening)

## 2018-09-21 NOTE — DISCHARGE NOTE ADULT - MEDICATION SUMMARY - MEDICATIONS TO TAKE
I will START or STAY ON the medications listed below when I get home from the hospital:    finasteride 5 mg oral tablet  -- 1 tab(s) by mouth once a day  -- Indication: For BPH    tamsulosin 0.4 mg oral capsule  -- 1 cap(s) by mouth once a day (at bedtime)  -- Indication: For BPH    PARoxetine 40 mg oral tablet  -- 1 tab(s) by mouth once a day   -- Indication: For Depression     simvastatin 40 mg oral tablet  -- 1 tab(s) by mouth once a day (at bedtime)  -- Indication: For HLD    SEROquel 25 mg oral tablet  -- 0.5 tab(s) by mouth 2 times a day   -- Check with your doctor before becoming pregnant.  Do not drink alcoholic beverages when taking this medication.  May cause drowsiness.  Alcohol may intensify this effect.  Use care when operating dangerous machinery.  Obtain medical advice before taking any non-prescription drugs as some may affect the action of this medication.  This drug may impair the ability to drive or operate machinery.  Use care until you become familiar with its effects.    -- Indication: For Depression    memantine 5 mg oral tablet  -- 1 tab(s) by mouth 2 times a day  -- Indication: For Dementia     fluticasone 50 mcg/inh nasal spray  -- 1 spray(s) into nose once a day  -- Indication: For Nasal congestion     ocular lubricant ophthalmic solution  -- 1 drop(s) to each affected eye 4 times a day  -- Indication: For Prophylactic measure    ketorolac 0.5% ophthalmic solution  -- 1 drop(s) to each affected eye once a day  -- Indication: For Glucoma     BRIMONIDINE TARTRATE .2 % SOLN  -- Indication: For Glucoma     DORZOL/TIMOL SOL 22.3-6.8  -- Indication: For Glucoma     OMEPRAZOLE 40 MG CPDR  -- Indication: For GERD (gastroesophageal reflux disease)

## 2018-09-22 VITALS
OXYGEN SATURATION: 95 % | TEMPERATURE: 97 F | SYSTOLIC BLOOD PRESSURE: 131 MMHG | HEART RATE: 75 BPM | DIASTOLIC BLOOD PRESSURE: 82 MMHG | RESPIRATION RATE: 17 BRPM

## 2018-09-22 DIAGNOSIS — G30.9 ALZHEIMER'S DISEASE, UNSPECIFIED: ICD-10-CM

## 2018-09-22 LAB
ANION GAP SERPL CALC-SCNC: 8 MMOL/L — SIGNIFICANT CHANGE UP (ref 5–17)
BUN SERPL-MCNC: 19 MG/DL — HIGH (ref 7–18)
CALCIUM SERPL-MCNC: 8.4 MG/DL — SIGNIFICANT CHANGE UP (ref 8.4–10.5)
CHLORIDE SERPL-SCNC: 107 MMOL/L — SIGNIFICANT CHANGE UP (ref 96–108)
CO2 SERPL-SCNC: 24 MMOL/L — SIGNIFICANT CHANGE UP (ref 22–31)
CREAT SERPL-MCNC: 1.2 MG/DL — SIGNIFICANT CHANGE UP (ref 0.5–1.3)
GLUCOSE SERPL-MCNC: 100 MG/DL — HIGH (ref 70–99)
PHOSPHATE SERPL-MCNC: 2.4 MG/DL — LOW (ref 2.5–4.5)
POTASSIUM SERPL-MCNC: 4.1 MMOL/L — SIGNIFICANT CHANGE UP (ref 3.5–5.3)
POTASSIUM SERPL-SCNC: 4.1 MMOL/L — SIGNIFICANT CHANGE UP (ref 3.5–5.3)
SODIUM SERPL-SCNC: 139 MMOL/L — SIGNIFICANT CHANGE UP (ref 135–145)

## 2018-09-22 PROCEDURE — 99233 SBSQ HOSP IP/OBS HIGH 50: CPT | Mod: GC

## 2018-09-22 RX ORDER — PREGABALIN 225 MG/1
1000 CAPSULE ORAL ONCE
Qty: 0 | Refills: 0 | Status: COMPLETED | OUTPATIENT
Start: 2018-09-22 | End: 2018-09-22

## 2018-09-22 RX ADMIN — SODIUM CHLORIDE 80 MILLILITER(S): 9 INJECTION INTRAMUSCULAR; INTRAVENOUS; SUBCUTANEOUS at 06:55

## 2018-09-22 RX ADMIN — Medication 1 DROP(S): at 06:54

## 2018-09-22 RX ADMIN — SODIUM CHLORIDE 80 MILLILITER(S): 9 INJECTION INTRAMUSCULAR; INTRAVENOUS; SUBCUTANEOUS at 11:26

## 2018-09-22 RX ADMIN — Medication 1 DROP(S): at 00:13

## 2018-09-22 RX ADMIN — Medication 1 DROP(S): at 11:20

## 2018-09-22 RX ADMIN — PREGABALIN 1000 MICROGRAM(S): 225 CAPSULE ORAL at 11:29

## 2018-09-22 RX ADMIN — HEPARIN SODIUM 5000 UNIT(S): 5000 INJECTION INTRAVENOUS; SUBCUTANEOUS at 06:52

## 2018-09-22 RX ADMIN — Medication 40 MILLIGRAM(S): at 11:19

## 2018-09-22 RX ADMIN — DORZOLAMIDE HYDROCHLORIDE TIMOLOL MALEATE 1 DROP(S): 20; 5 SOLUTION/ DROPS OPHTHALMIC at 06:53

## 2018-09-22 RX ADMIN — FINASTERIDE 5 MILLIGRAM(S): 5 TABLET, FILM COATED ORAL at 11:29

## 2018-09-22 RX ADMIN — MEMANTINE HYDROCHLORIDE 5 MILLIGRAM(S): 10 TABLET ORAL at 06:52

## 2018-09-22 RX ADMIN — PANTOPRAZOLE SODIUM 40 MILLIGRAM(S): 20 TABLET, DELAYED RELEASE ORAL at 06:52

## 2018-09-22 RX ADMIN — BRIMONIDINE TARTRATE 1 DROP(S): 2 SOLUTION/ DROPS OPHTHALMIC at 06:53

## 2018-09-22 NOTE — PROGRESS NOTE ADULT - PROBLEM SELECTOR PLAN 3
LORENZO on CKD stage 3,  resolved today  most likely was secondary to dehydration and poor PO intake

## 2018-09-22 NOTE — PROGRESS NOTE ADULT - ASSESSMENT
HPI:  82yo AO X3 M from home, lives alone, with PMH of GERD, HLD, BPH, single unilateral lt. kidney and PSH of cholecystectomy and Cataract surgery presented to ED c/o weakness and confusion for 2 weeks. He is a poor historian, states he fell 2 weeks ago when he was trying to get off from bed and hurt his left hip and arm, denies any visual changes/N/V/ seizure like activity/loss of consciousness/headache. However, he states that since past few weeks he has not been able to take care of himself and is unable to walk like before. He also endorses urinary and stool incontinence. He also complaints of hand tremors, dizziness and decreased appetite.    History obtained from daughter over phone, she states he was living with her in [place] and moved to [place] on july, since august he has been having worsening confusion with difficulty taking his medications at right time. He had urine infection 1 week ago and was  given cipro by urgent care. She states she does not know if he took the antibiotic but he has not been doing good since then. She was informed about his condition by family friend who lives near him and she flew to [place] 2 days ago. She was planning to take him with her but saw his apartment was messed up with urine and feces all over and so she brought her here. She also states he was started on quetiapine and paroxetine about 1 year ago by his PCP.       ED course: Vitals: BP: 131/98 HR: 85  RR: 16 T: 98.1  SaO2: 96  Labs significant for Na 133, Cr 1.74  CT head negative for acute events.  orthostatics positive  EKG shows NSR @69 bpm with RBBB (18 Sep 2018 02:43)

## 2018-09-22 NOTE — PROGRESS NOTE ADULT - SUBJECTIVE AND OBJECTIVE BOX
Patient is a 83y old  Male who presents with a chief complaint of Weakness and confusion (21 Sep 2018 16:19)    INTERVAL HPI/OVERNIGHT EVENTS:  patient seen and examined at bedside,   family at his side. They would like to take him home today then plan to fly to [place] where the daughter live.  Daughter doubt that patient was mixing his meds wrong way and possibly he was taking more then he is supposed to.     Allergies    No Known Allergies    Intolerances    REVIEW OF SYSTEMS:  CONSTITUTIONAL: No fever, weight loss, or fatigue  EYES: No eye pain, visual disturbances, or discharge  RESPIRATORY: No cough, wheezing or shortness of breath  CARDIOVASCULAR: No chest pain, feeling of heart beats  GASTROINTESTINAL: No abdominal or epigastric pain. No nausea, vomiting; No diarrhea or constipation.  GENITOURINARY: No dysuria, frequency, hematuria  NEUROLOGICAL: No headaches  MUSCULOSKELETAL: No joint pain  PSYCHIATRIC: No depression or anxiety  HEME/LYMPH: No easy bruising, or bleeding gums  ALLERGY AND IMMUNOLOGIC: No hives or eczema    Medications:  acetaminophen   Tablet .. 650 milliGRAM(s) Oral every 6 hours PRN Mild Pain (1 - 3), Moderate Pain (4 - 6)  artificial  tears Solution 1 Drop(s) Both EYES four times a day  brimonidine 0.2% Ophthalmic Solution 1 Drop(s) Both EYES two times a day  dorzolamide 2%/timolol 0.5% Ophthalmic Solution 1 Drop(s) Both EYES two times a day  finasteride 5 milliGRAM(s) Oral daily  fluticasone propionate 50 MICROgram(s)/spray Nasal Spray 1 Spray(s) Both Nostrils daily  heparin  Injectable 5000 Unit(s) SubCutaneous every 8 hours  ketorolac 0.5% Ophthalmic Solution 1 Drop(s) Both EYES daily  memantine 5 milliGRAM(s) Oral two times a day  ondansetron Injectable 4 milliGRAM(s) IV Push once  pantoprazole    Tablet 40 milliGRAM(s) Oral before breakfast  PARoxetine 40 milliGRAM(s) Oral <User Schedule>  QUEtiapine 12.5 milliGRAM(s) Oral two times a day PRN agitation  simvastatin 40 milliGRAM(s) Oral at bedtime  sodium chloride 0.9%. 1000 milliLiter(s) IV Continuous <Continuous>  tamsulosin 0.4 milliGRAM(s) Oral at bedtime      PHYSICAL EXAM:  Vital Signs Last 24 Hrs  T(C): 36.3 (22 Sep 2018 12:36), Max: 37 (21 Sep 2018 19:33)  T(F): 97.4 (22 Sep 2018 12:36), Max: 98.6 (21 Sep 2018 19:33)  HR: 75 (22 Sep 2018 12:36) (75 - 80)  BP: 131/82 (22 Sep 2018 12:36) (128/66 - 173/94)  BP(mean): --  RR: 17 (22 Sep 2018 12:36) (17 - 18)  SpO2: 95% (22 Sep 2018 12:36) (93% - 97%)    GENERAL: NAD  HEAD:  Atraumatic, Normocephalic  EYES: EOMI, PERRLA, conjunctiva and sclera clear  ENT: moist mucous membranes  NECK: Supple, No JVD, Normal thyroid  NERVOUS SYSTEM:  Alert & awake, oriented x0.   CHEST/LUNG: No rales, rhonchi, wheezing, or rubs  HEART: Regular rate and rhythm; No murmurs, rubs, or gallops  ABDOMEN: Soft, Nontender, Nondistended; Bowel sounds present  EXTREMITIES:  2+ Peripheral Pulses, No clubbing, cyanosis, or edema  SKIN: No rashes or lesions    LABS:    09-22    139  |  107  |  19<H>  ----------------------------<  100<H>  4.1   |  24  |  1.20    Ca    8.4      22 Sep 2018 08:17  Phos  2.4     09-22    Culture & Sensitivity:   CAPILLARY BLOOD GLUCOSE    09-21 @ 07:01  -  09-22 @ 07:00  --------------------------------------------------------  IN: 600 mL / OUT: 2 mL / NET: 598 mL    RADIOLOGY & ADDITIONAL TESTS:  Radiology testing independently reviewed:   < from: CT Head No Cont (09.17.18 @ 23:38) >  No acute intracranial hemorrhage or mass effect.    Paranasal sinus disease as described, with complete opacification of the   left sphenoid and maxillary sinuses.    A preliminary report was issued by Dr. BOY SCOTT M.D.;[facility] RADIOLOGIST.    < end of copied text >    Consultant(s) Notes Reviewed:  [ x] YES  [ ] NO    Care Discussed with Consultants/Other Providers [ x] YES  [ ] NO

## 2018-09-22 NOTE — PROGRESS NOTE ADULT - PROBLEM SELECTOR PLAN 1
metabolic, polypharmacy? worsening of underlying dementia.   Psych meds adjusted by psych  TSH wnl, Vitamin B12 normal, syphillis negative.

## 2018-10-24 PROCEDURE — 82436 ASSAY OF URINE CHLORIDE: CPT

## 2018-10-24 PROCEDURE — 97162 PT EVAL MOD COMPLEX 30 MIN: CPT

## 2018-10-24 PROCEDURE — 83735 ASSAY OF MAGNESIUM: CPT

## 2018-10-24 PROCEDURE — 80053 COMPREHEN METABOLIC PANEL: CPT

## 2018-10-24 PROCEDURE — 83036 HEMOGLOBIN GLYCOSYLATED A1C: CPT

## 2018-10-24 PROCEDURE — 97530 THERAPEUTIC ACTIVITIES: CPT

## 2018-10-24 PROCEDURE — 81003 URINALYSIS AUTO W/O SCOPE: CPT

## 2018-10-24 PROCEDURE — 83935 ASSAY OF URINE OSMOLALITY: CPT

## 2018-10-24 PROCEDURE — 84133 ASSAY OF URINE POTASSIUM: CPT

## 2018-10-24 PROCEDURE — 84156 ASSAY OF PROTEIN URINE: CPT

## 2018-10-24 PROCEDURE — 71045 X-RAY EXAM CHEST 1 VIEW: CPT

## 2018-10-24 PROCEDURE — 73030 X-RAY EXAM OF SHOULDER: CPT

## 2018-10-24 PROCEDURE — 82533 TOTAL CORTISOL: CPT

## 2018-10-24 PROCEDURE — 82550 ASSAY OF CK (CPK): CPT

## 2018-10-24 PROCEDURE — 85027 COMPLETE CBC AUTOMATED: CPT

## 2018-10-24 PROCEDURE — 87086 URINE CULTURE/COLONY COUNT: CPT

## 2018-10-24 PROCEDURE — 82553 CREATINE MB FRACTION: CPT

## 2018-10-24 PROCEDURE — 84100 ASSAY OF PHOSPHORUS: CPT

## 2018-10-24 PROCEDURE — 70450 CT HEAD/BRAIN W/O DYE: CPT

## 2018-10-24 PROCEDURE — 84300 ASSAY OF URINE SODIUM: CPT

## 2018-10-24 PROCEDURE — 93005 ELECTROCARDIOGRAM TRACING: CPT

## 2018-10-24 PROCEDURE — 73521 X-RAY EXAM HIPS BI 2 VIEWS: CPT

## 2018-10-24 PROCEDURE — 82009 KETONE BODYS QUAL: CPT

## 2018-10-24 PROCEDURE — 83880 ASSAY OF NATRIURETIC PEPTIDE: CPT

## 2018-10-24 PROCEDURE — 85730 THROMBOPLASTIN TIME PARTIAL: CPT

## 2018-10-24 PROCEDURE — 82306 VITAMIN D 25 HYDROXY: CPT

## 2018-10-24 PROCEDURE — 97110 THERAPEUTIC EXERCISES: CPT

## 2018-10-24 PROCEDURE — 84443 ASSAY THYROID STIM HORMONE: CPT

## 2018-10-24 PROCEDURE — 84484 ASSAY OF TROPONIN QUANT: CPT

## 2018-10-24 PROCEDURE — 83090 ASSAY OF HOMOCYSTEINE: CPT

## 2018-10-24 PROCEDURE — 90686 IIV4 VACC NO PRSV 0.5 ML IM: CPT

## 2018-10-24 PROCEDURE — 86780 TREPONEMA PALLIDUM: CPT

## 2018-10-24 PROCEDURE — 82570 ASSAY OF URINE CREATININE: CPT

## 2018-10-24 PROCEDURE — 82746 ASSAY OF FOLIC ACID SERUM: CPT

## 2018-10-24 PROCEDURE — 80048 BASIC METABOLIC PNL TOTAL CA: CPT

## 2018-10-24 PROCEDURE — 97116 GAIT TRAINING THERAPY: CPT

## 2018-10-24 PROCEDURE — 94640 AIRWAY INHALATION TREATMENT: CPT

## 2018-10-24 PROCEDURE — 80061 LIPID PANEL: CPT

## 2018-10-24 PROCEDURE — 99285 EMERGENCY DEPT VISIT HI MDM: CPT | Mod: 25

## 2018-10-24 PROCEDURE — 85610 PROTHROMBIN TIME: CPT

## 2018-10-24 PROCEDURE — 73080 X-RAY EXAM OF ELBOW: CPT

## 2018-10-24 PROCEDURE — 83690 ASSAY OF LIPASE: CPT

## 2018-10-24 PROCEDURE — 82607 VITAMIN B-12: CPT

## 2019-01-23 NOTE — BEHAVIORAL HEALTH ASSESSMENT NOTE - TELEPSYCHIATRY?
No Problem: Pressure Injury - Risk of  Goal: *Prevention of pressure injury  Document Patrice Scale and appropriate interventions in the flowsheet. Outcome: Progressing Towards Goal  Pressure Injury Interventions: Activity Interventions: Pressure redistribution bed/mattress(bed type)    Mobility Interventions: Pressure redistribution bed/mattress (bed type), PT/OT evaluation    Nutrition Interventions: Document food/fluid/supplement intake                    Problem: Falls - Risk of  Goal: *Absence of Falls  Document Hunter Fall Risk and appropriate interventions in the flowsheet.   Outcome: Progressing Towards Goal  Fall Risk Interventions:  Mobility Interventions: Communicate number of staff needed for ambulation/transfer, Patient to call before getting OOB         Medication Interventions: Patient to call before getting OOB    Elimination Interventions: Call light in reach

## 2019-03-18 NOTE — PROGRESS NOTE ADULT - PROBLEM/PLAN-2
TRANSFER - IN REPORT:    Verbal report received from Daniel Lopez RN on Tereso Connors being received from ER for routine progression of care. Report consisted of patients Situation, Background, Assessment and Recommendations(SBAR). Information from the following report(s) SBAR, [facility], [facility] and Recent Results was reviewed. Opportunity for questions and clarification was provided.
DISPLAY PLAN FREE TEXT
DISPLAY PLAN FREE TEXT

## 2019-03-27 NOTE — DISCHARGE NOTE ADULT - MEDICATION SUMMARY - MEDICATIONS TO STOP TAKING
[id number] with any further questions. I will STOP taking the medications listed below when I get home from the hospital:  None

## 2020-03-28 NOTE — ED PROVIDER NOTE - RESPIRATORY, MLM
Orders for discharge received from Dr. Eddy . Pt. Discharged to A2 at this time. Report given to [facility]. Transferred via [facility] to A2.  Cristian Serrato RN Breath sounds clear and equal bilaterally.

## 2020-05-14 NOTE — ED ADULT NURSE NOTE - CHIEF COMPLAINT
The patient is a 82y Male complaining of abdominal pain. Additional Notes: Patient consent was obtained to proceed with the visit and recommended plan of care after discussion of all risks and benefits, including the risks of COVID-19 exposure. Detail Level: Simple

## 2020-07-13 NOTE — PHYSICAL THERAPY INITIAL EVALUATION ADULT - LONG TERM MEMORY, REHAB EVAL
[FreeTextEntry1] : \par \par Impression/plan: 38-year-old gentleman with microscopic hematuria.  We will move forward with a work-up including a urine culture and sensitivity, urine cytology, cystoscopy, and CT urogram. intact

## 2021-06-23 NOTE — PATIENT PROFILE ADULT. - PRESSURE ULCER(S)
Spoke with patient and discussed below recommendations.  Patient is frustrated, but with understanding and Dr. Colvin office will be contacted.   no

## 2021-12-17 NOTE — ED PROVIDER NOTE - CROS ED HEME ALL NEG
[negative...

## 2021-12-28 NOTE — PATIENT PROFILE ADULT. - FUNCTIONAL SCREEN CURRENT LEVEL: TOILETING, MLM
January 10, 2022      Audrey Ortega  [address]  [facility] [address]        Dear ,    We are writing to inform you of your test results.    {results letter list:[id number]}    Resulted Orders   UA Macro with Reflex to Micro and Culture - lab collect   Result Value Ref Range    Color Urine Yellow Colorless, Straw, Light Yellow, Yellow    Appearance Urine Slightly Cloudy (A) Clear    Glucose Urine Negative Negative mg/dL    Bilirubin Urine Negative Negative    Ketones Urine Negative Negative mg/dL    Specific Gravity Urine 1.018 1.003 - 1.035    Blood Urine Moderate (A) Negative    pH Urine 7.0 5.0 - 7.0    Protein Albumin Urine 30  (A) Negative mg/dL    Urobilinogen Urine 2.0 Normal, 2.0 mg/dL    Nitrite Urine Negative Negative    Leukocyte Esterase Urine Large (A) Negative    Bacteria Urine Moderate (A) None Seen /HPF    WBC Clumps Urine Present (A) None Seen /HPF    Mucus Urine Present (A) None Seen /LPF    RBC Urine >182 (H) <=2 /HPF    WBC Urine 149 (H) <=5 /HPF    Squamous Epithelials Urine 5 (H) <=1 /HPF    Hyaline Casts Urine 80 (H) <=2 /LPF    Narrative    Urine Culture ordered based on laboratory criteria   Urine Culture   Result Value Ref Range    Culture >100,000 CFU/mL Escherichia coli (A)     Culture <10,000 CFU/mL Urogenital kisha        If you have any questions or concerns, please call the clinic at the number listed above.       Sincerely,      Javier Sampson,            (0) independent

## 2022-02-07 NOTE — ED PROVIDER NOTE - CROS ED ENDOCRINE ALL NEG
Patient is resting in bed, A&O x self, VSS, and denies pain or nausea. Tolerating general diet. IVF infusing. Primofit in place and voiding. Patient consistently pulling at lines and primofit, reorientation not working, patient attempting to hit staff multiple times, busy apron now in place. Bed is in lowest position, safety precautions are in place, and call light is within reach. Will continue to monitor and round frequently. 2250  Patient very agitated, attempting to get out of bed, and very upset with nursing staff and kicking them. Attempted to orient patient with no success. Called patient's daughter to help orient patient with no success. MD notified. 2300  RRT called for new onset of chest pressure, see RRT note. Family updated on POC. Tele now in place. MD notified with lab critical lab results. Problem: Patient Centered Care  Goal: Patient preferences are identified and integrated in the patient's plan of care  Description: Interventions:  - What would you like us to know as we care for you? Patient lives alone and her Mom was her support system. Patient opts to go to rehab post discharge in a private room. - Provide timely, complete, and accurate information to patient/family  - Incorporate patient and family knowledge, values, beliefs, and cultural backgrounds into the planning and delivery of care  - Encourage patient/family to participate in care and decision-making at the level they choose  - Honor patient and family perspectives and choices  Outcome: Progressing     Problem: Patient/Family Goals  Goal: Patient/Family Long Term Goal  Description: Patient's Long Term Goal: Will continue to heal from surgery and will not have any complications. Interventions:  - Monitor incision for any signs of infection. - Up with walker, WBAT on right leg.  - Pain management with oral medications. - SUPA hose on bilateral legs. - May use ice to incision to prevent swelling or help reduce pain.   - See additional Care Plan goals for specific interventions  Outcome: Progressing  Goal: Patient/Family Short Term Goal  Description: Patient's Short Term Goal: Pain controlled with oral medications. Interventions:   - Monitor incision for any signs of infection. - Up with walker, WBAT on right leg.  - Pain management with oral medications. - SUPA hose on bilateral legs. - May use ice to incision to prevent swelling or help reduce pain.  - PT/OT as ordered. - Oral anticoagulation to prevent blood clots. - Fall prevention.  - See additional Care Plan goals for specific interventions  Outcome: Progressing     Problem: PAIN - ADULT  Goal: Verbalizes/displays adequate comfort level or patient's stated pain goal  Description: INTERVENTIONS:  - Encourage pt to monitor pain and request assistance  - Assess pain using appropriate pain scale  - Administer analgesics based on type and severity of pain and evaluate response  - Implement non-pharmacological measures as appropriate and evaluate response  - Consider cultural and social influences on pain and pain management  - Manage/alleviate anxiety  - Utilize distraction and/or relaxation techniques  - Monitor for opioid side effects  - Notify MD/LIP if interventions unsuccessful or patient reports new pain  - Anticipate increased pain with activity and pre-medicate as appropriate  Outcome: Progressing     Problem: RISK FOR INFECTION - ADULT  Goal: Absence of fever/infection during anticipated neutropenic period  Description: INTERVENTIONS  - Monitor WBC  - Administer growth factors as ordered  - Implement neutropenic guidelines  Outcome: Progressing     Problem: SAFETY ADULT - FALL  Goal: Free from fall injury  Description: INTERVENTIONS:  - Assess pt frequently for physical needs  - Identify cognitive and physical deficits and behaviors that affect risk of falls.   - [place] fall precautions as indicated by assessment.  - Educate pt/family on patient safety including physical limitations  - Instruct pt to call for assistance with activity based on assessment  - Modify environment to reduce risk of injury  - Provide assistive devices as appropriate  - Consider OT/PT consult to assist with strengthening/mobility  - Encourage toileting schedule  Outcome: Progressing     Problem: DISCHARGE PLANNING  Goal: Discharge to home or other facility with appropriate resources  Description: INTERVENTIONS:  - Identify barriers to discharge w/pt and caregiver  - Include patient/family/discharge partner in discharge planning  - Arrange for needed discharge resources and transportation as appropriate  - Identify discharge learning needs (meds, wound care, etc)  - Arrange for interpreters to assist at discharge as needed  - Consider post-discharge preferences of patient/family/discharge partner  - Complete POLST form as appropriate  - Assess patient's ability to be responsible for managing their own health  - Refer to Case Management Department for coordinating discharge planning if the patient needs post-hospital services based on physician/LIP order or complex needs related to functional status, cognitive ability or social support system  Outcome: Progressing     Problem: SKIN/TISSUE INTEGRITY - ADULT  Goal: Incision(s), wounds(s) or drain site(s) healing without S/S of infection  Description: INTERVENTIONS:  - Assess and document risk factors for pressure ulcer development  - Assess and document skin integrity  - Assess and document dressing/incision, wound bed, drain sites and surrounding tissue  - Implement wound care per orders  - Initiate isolation precautions as appropriate  - Initiate Pressure Ulcer prevention bundle as indicated  Outcome: Progressing     Problem: MUSCULOSKELETAL - ADULT  Goal: Maintain proper alignment of affected body part  Description: INTERVENTIONS:  - Support and protect limb and body alignment per provider's orders  - Instruct and reinforce with patient and family use of appropriate assistive device and precautions (e.g. spinal or hip dislocation precautions)  Outcome: Progressing negative...

## 2022-09-17 NOTE — ED ADULT NURSE NOTE - NS ED NOTE  TALK SOMEONE YN
Patient seen and evaluated at bedside    Reason for consult: CP    HPI: 51yoF presenting to the ED with CP. PMH of CAD s/p mLAD MONI (10/12/2021) for NSTEMI on ASA ad Plavix with reported compliance.     Patient reports 1xwk hx of CP, substernal, burning with rads to the throat that occurs at rest and lasts typically ~20min. She was concerned that this was reflux disease and was evaluated at her PCP who performed an ECG with TWI in leads V2-4. The pcp did not have access to previous ECG's and told her to present to the ED for evaluation. The patient states that this CP is similar in character to her NSTEMI but a little different.     Review of old ECG's shows similar TWI in versions noted in:  , , and 10/21. Troponin in the ED <6. CP free at this time.       PMHx:   No pertinent past medical history    Diabetes    HTN (hypertension)    Acute myocardial infarction    CAD (coronary artery disease)    Obesity, Class I, BMI 30-34.9    Varicose veins of both lower extremities    History of UTI    Leiomyoma of uterus    Anemia        PSHx:   No significant past surgical history    S/P cholecystectomy    H/O tubal ligation    Stented coronary artery        Allergies:  No Known Allergies      Home Meds:  Home Medications:  aspirin 81 mg oral tablet, chewable: 1 tab(s) orally once a day (17 Mar 2022 08:15)  clopidogrel 75 mg oral tablet: 1 tab(s) orally once a day (17 Mar 2022 08:15)  iron 27 m tab(s) orally once a day (17 Mar 2022 08:15)  Jardiance 10 mg oral tablet: 1 tab(s) orally once a day  (17 Mar 2022 08:15)  metFORMIN 1000 mg oral tablet: 1 tab(s) orally 2 times a day (17 Mar 2022 08:15)  norethindrone 5 mg oral tablet: 1 tab(s) orally once a day (17 Mar 2022 08:15)              FAMILY HISTORY:  No pertinent family history in first degree relatives. No pertinent family history of: atrial fibrillation.     Social History:  Social History (marital status, living situation, occupation, tobacco use, alcohol and drug use, and sexual history): no alcohol tobacco or drug hx     Tobacco Screening:  · Core Measure Site	No    Review of Systems:  REVIEW OF SYSTEMS:  CONSTITUTIONAL: No weakness, fevers or chills  EYES/ENT: No visual changes;  No dysphagia  NECK: No pain or stiffness  RESPIRATORY: No cough, wheezing, hemoptysis; No shortness of breath  CARDIOVASCULAR: No chest pain or palpitations; No lower extremity edema  GASTROINTESTINAL: No abdominal or epigastric pain. No nausea, vomiting, or diarrhea  BACK: No back pain  GENITOURINARY: No dysuria, frequency or hematuria  NEUROLOGICAL: No numbness or weakness  SKIN: No itching, burning, rashes, or lesions   All other review of systems is negative unless indicated above.      Physical Exam:  T(F): 97.9 (), Max: 97.9 ()  HR: 79 () (76 - 79)  BP: 114/61 () (114/61 - 121/)  RR: 16 ()  SpO2: 100% ()  GENERAL: No acute distress, well-developed  HEAD:  Atraumatic, Normocephalic  ENT: EOMI, PERRLA, conjunctiva and sclera clear, Neck supple, No JVD, moist mucosa  CHEST/LUNG: Clear to auscultation bilaterally; No wheeze, equal breath sounds bilaterally   BACK: No spinal tenderness  HEART: Regular rate and rhythm; No murmurs, rubs, or gallops  ABDOMEN: Soft, Nontender, Nondistended; Bowel sounds present  EXTREMITIES:  No clubbing, cyanosis, or edema  PSYCH: Nl behavior, nl affect  NEUROLOGY: AAOx3, non-focal, cranial nerves intact  SKIN: Normal color, No rashes or lesions    CXR: Personally reviewed    Labs: Personally reviewed    EKG's personally reviewed.                         10.3   8.05  )-----------( 301      ( 17 Sep 2022 11:47 )             36.4         139  |  105  |  16  ----------------------------<  104<H>  4.0   |  23  |  0.61    Ca    8.8      17 Sep 2022 11:47    TPro  7.7  /  Alb  4.5  /  TBili  0.3  /  DBili  x   /  AST  16  /  ALT  15  /  AlkPhos  120      PT/INR - ( 17 Sep 2022 11:47 )   PT: 12.4 sec;   INR: 1.07 ratio         PTT - ( 17 Sep 2022 11:47 )  PTT:26.6 sec  Serum Pro-Brain Natriuretic Peptide: 56 pg/mL ( @ 11:47)        
No

## 2022-10-18 NOTE — PROGRESS NOTE ADULT - ASSESSMENT
Bone density shows still osteopenia but improved from 3 years ago particularly in the femur area  Continue to take adequate calcium/vitamin d and exercise regularly   84yo AO X3 M from home, lives alone, with PMH of GERD, HLD, BPH, single unilateral lt. kidney and PSH of cholecystectomy and Cataract surgery presented to ED c/o worsening weakness and confusion for 2 weeks.    ED course: Vitals: BP: 131/98 HR: 85  RR: 16 T: 98.1  SaO2: 96  Labs significant for Na 133, Cr 1.74  CT head negative for acute events.  EKG shows NSR @69 bpm with RBBB    He is admitted to work up for dementia.

## 2023-10-10 NOTE — ED ADULT NURSE NOTE - NS ED NOTE ABUSE RESPONSE YN
I counseled the patient to follow up with Patient Mistake.    This encounter was created in error - please disregard.    Testing camera connection which was perfect.   Yes

## 2024-02-26 NOTE — H&P ADULT - PROBLEM/PLAN-5
CLINICAL PHARMACY NOTE: MEDS TO BEDS    Total # of Prescriptions Filled: 6   The following medications were delivered to the patient:  Fluticasone (Generic Flovent inhaler)  Calcium Carb antacid 500mg  Divalproex DR 500mg  Hydroxyzine HCL 50mg  Paliperidone ER 9mg  Trazodone 50mg     Additional Documentation: delivered to LATASHA Jerez 2/26/24 -Famotidine, Metformin, Oxcarbazepine, Propranolol all too soon on file at [facility] [phone]   DISPLAY PLAN FREE TEXT

## 2024-06-20 NOTE — ED PROVIDER NOTE - CROS ED ENDOCRINE ALL NEG
Biopsy Photograph Reviewed: Yes Size Of Lesion In Cm: 0.9 Size Of Margin In Cm: 0.4 Anesthesia Volume In Cc: 5 Was An Eye Clamp Used?: No Eye Clamp Note Details: An eye clamp was used during the procedure. Excision Method: Elliptical Saucerization Depth: dermis and superficial adipose tissue Repair Type: Complex Intermediate / Complex Repair - Final Wound Length In Cm: 5.9 Suturegard Retention Suture: 2-0 Nylon Retention Suture Bite Size: 3 mm Length To Time In Minutes Device Was In Place: 10 Number Of Hemigard Strips Per Side: 1 Width Of Defect Perpendicular To Closure In Cm (Required): 1.7 Distance Of Undermining In Cm (Required): 1.8 Undermining Type: Entire Wound Debridement Text: The wound edges were debrided prior to proceeding with the closure to facilitate wound healing. Helical Rim Text: The closure involved the helical rim. Vermilion Border Text: The closure involved the vermilion border. Nostril Rim Text: The closure involved the nostril rim. Retention Suture Text: Retention sutures were placed to support the closure and prevent dehiscence. Primary Defect Length (In Cm): 0 Lab: [id number] Lab Facility: [id number] Graft Donor Site Bandage (Optional-Leave Blank If You Don't Want In Note): . Epidermal Closure Graft Donor Site (Optional): simple interrupted Billing Type: Third-Party Bill Excision Depth: adipose tissue Scalpel Size: 15 blade Anesthesia Type: 1% lidocaine with epinephrine and a 1:10 solution of 8.4% sodium bicarbonate Hemostasis: Electrocautery Estimated Blood Loss (Cc): minimal Detail Level: Detailed negative... Repair Depth: use same depth as excision depth Deep Sutures: 3-0 PDS Additional Epidermal Sutures: Dermabond Epidermal Closure: running subcuticular Wound Care: No ointment Dressing: pressure dressing with telfa Suturegard Intro: Intraoperative tissue expansion was performed, utilizing the SUTUREGARD device, in order to reduce wound tension. Suturegard Body: The suture ends were repeatedly re-tightened and re-clamped to achieve the desired tissue expansion. Hemigard Intro: Due to skin fragility and wound tension, it was decided to use HEMIGARD adhesive retention suture devices to permit a linear closure. The skin was cleaned and dried for a 6cm distance away from the wound. Excessive hair, if present, was removed to allow for adhesion. Hemigard Postcare Instructions: The HEMIGARD strips are to remain completely dry for at least 5-7 days. Positioning (Leave Blank If You Do Not Want): The patient was placed in a comfortable position exposing the surgical site. Pre-Excision Curettage Text (Leave Blank If You Do Not Want): Prior to drawing the surgical margin the visible lesion was removed with electrodesiccation and curettage to clearly define the lesion size. Complex Repair Preamble Text (Leave Blank If You Do Not Want): Extensive wide undermining was performed. Intermediate Repair Preamble Text (Leave Blank If You Do Not Want): Undermining was performed with blunt dissection. Curvilinear Excision Additional Text (Leave Blank If You Do Not Want): The margin was drawn around the clinically apparent lesion.  A curvilinear shape was then drawn on the skin incorporating the lesion and margins.  Incisions were then made along these lines to the appropriate tissue plane and the lesion was extirpated. Fusiform Excision Additional Text (Leave Blank If You Do Not Want): The margin was drawn around the clinically apparent lesion.  A fusiform shape was then drawn on the skin incorporating the lesion and margins.  Incisions were then made along these lines to the appropriate tissue plane and the lesion was extirpated. Elliptical Excision Additional Text (Leave Blank If You Do Not Want): The margin was drawn around the clinically apparent lesion.  An elliptical shape was then drawn on the skin incorporating the lesion and margins.  Incisions were then made along these lines to the appropriate tissue plane and the lesion was extirpated. Saucerization Excision Additional Text (Leave Blank If You Do Not Want): The margin was drawn around the clinically apparent lesion.  Incisions were then made along these lines, in a tangential fashion, to the appropriate tissue plane and the lesion was extirpated. Slit Excision Additional Text (Leave Blank If You Do Not Want): A linear line was drawn on the skin overlying the lesion. An incision was made slowly until the lesion was visualized.  Once visualized, the lesion was removed with blunt dissection. Excisional Biopsy Additional Text (Leave Blank If You Do Not Want): The margin was drawn around the clinically apparent lesion. An elliptical shape was then drawn on the skin incorporating the lesion and margins.  Incisions were then made along these lines to the appropriate tissue plane and the lesion was extirpated. Perilesional Excision Additional Text (Leave Blank If You Do Not Want): The margin was drawn around the clinically apparent lesion. Incisions were then made along these lines to the appropriate tissue plane and the lesion was extirpated. Round Excision Additional Text (Leave Blank If You Do Not Want): Using a surgical marker, burrow triangles were drawn incorporating the defect and placing the expected incisions within the relaxed skin tension lines where possible. The area thus outlined was incised to the appropriate tissue plane with a scalpel blade. Extensive wide undermining was performed in all directions to allow proper closure of the defect. Repair Performed By Another Provider Text (Leave Blank If You Do Not Want): After the tissue was excised the defect was repaired by another provider. No Repair - Repaired With Adjacent Surgical Defect Text (Leave Blank If You Do Not Want): After the excision the defect was repaired concurrently with another surgical defect which was in close approximation. Adjacent Tissue Transfer Text: The defect edges were debeveled with a #15 scalpel blade. Given the location of the defect and the proximity to free margins an adjacent tissue transfer was deemed most appropriate. Using a sterile surgical marker, an appropriate flap was drawn incorporating the defect and placing the expected incisions within the relaxed skin tension lines where possible. The area thus outlined was incised deep to adipose tissue with a #15 scalpel blade. The skin margins were undermined to an appropriate distance in all directions utilizing iris scissors and carried over to close the primary defect. Advancement Flap (Single) Text: The defect edges were debeveled with a #15 scalpel blade.  Given the location of the defect and the proximity to free margins a single advancement flap was deemed most appropriate.  Using a sterile surgical marker, an appropriate advancement flap was drawn incorporating the defect and placing the expected incisions within the relaxed skin tension lines where possible.    The area thus outlined was incised deep to adipose tissue with a #15 scalpel blade.  The skin margins were undermined to an appropriate distance in all directions utilizing iris scissors. Advancement Flap (Double) Text: The defect edges were debeveled with a #15 scalpel blade.  Given the location of the defect and the proximity to free margins a double advancement flap was deemed most appropriate.  Using a sterile surgical marker, the appropriate advancement flaps were drawn incorporating the defect and placing the expected incisions within the relaxed skin tension lines where possible.    The area thus outlined was incised deep to adipose tissue with a #15 scalpel blade.  The skin margins were undermined to an appropriate distance in all directions utilizing iris scissors. Burow's Advancement Flap Text: The defect edges were debeveled with a #15 scalpel blade.  Given the location of the defect and the proximity to free margins a Burow's advancement flap was deemed most appropriate.  Using a sterile surgical marker, the appropriate advancement flap was drawn incorporating the defect and placing the expected incisions within the relaxed skin tension lines where possible.    The area thus outlined was incised deep to adipose tissue with a #15 scalpel blade.  The skin margins were undermined to an appropriate distance in all directions utilizing iris scissors. Chonodrocutaneous Helical Advancement Flap Text: The defect edges were debeveled with a #15 scalpel blade. Given the location of the defect and the proximity to free margins a chondrocutaneous helical advancement flap was deemed most appropriate. Using a sterile surgical marker, the appropriate advancement flap was drawn incorporating the defect and placing the expected incisions within the relaxed skin tension lines where possible. The area thus outlined was incised deep to adipose tissue with a #15 scalpel blade. The skin margins were undermined to an appropriate distance in all directions utilizing iris scissors. Following this, the designed flap was advanced and carried over into the primary defect and sutured into place. Crescentic Advancement Flap Text: The defect edges were debeveled with a #15 scalpel blade.  Given the location of the defect and the proximity to free margins a crescentic advancement flap was deemed most appropriate.  Using a sterile surgical marker, the appropriate advancement flap was drawn incorporating the defect and placing the expected incisions within the relaxed skin tension lines where possible.    The area thus outlined was incised deep to adipose tissue with a #15 scalpel blade.  The skin margins were undermined to an appropriate distance in all directions utilizing iris scissors. A-T Advancement Flap Text: The defect edges were debeveled with a #15 scalpel blade.  Given the location of the defect, shape of the defect and the proximity to free margins an A-T advancement flap was deemed most appropriate.  Using a sterile surgical marker, an appropriate advancement flap was drawn incorporating the defect and placing the expected incisions within the relaxed skin tension lines where possible.    The area thus outlined was incised deep to adipose tissue with a #15 scalpel blade.  The skin margins were undermined to an appropriate distance in all directions utilizing iris scissors. O-T Advancement Flap Text: The defect edges were debeveled with a #15 scalpel blade.  Given the location of the defect, shape of the defect and the proximity to free margins an O-T advancement flap was deemed most appropriate.  Using a sterile surgical marker, an appropriate advancement flap was drawn incorporating the defect and placing the expected incisions within the relaxed skin tension lines where possible.    The area thus outlined was incised deep to adipose tissue with a #15 scalpel blade.  The skin margins were undermined to an appropriate distance in all directions utilizing iris scissors. O-L Flap Text: The defect edges were debeveled with a #15 scalpel blade.  Given the location of the defect, shape of the defect and the proximity to free margins an O-L flap was deemed most appropriate.  Using a sterile surgical marker, an appropriate advancement flap was drawn incorporating the defect and placing the expected incisions within the relaxed skin tension lines where possible.    The area thus outlined was incised deep to adipose tissue with a #15 scalpel blade.  The skin margins were undermined to an appropriate distance in all directions utilizing iris scissors. O-Z Flap Text: The defect edges were debeveled with a #15 scalpel blade. Given the location of the defect, shape of the defect and the proximity to free margins an O-Z flap was deemed most appropriate. Using a sterile surgical marker, an appropriate transposition flap was drawn incorporating the defect and placing the expected incisions within the relaxed skin tension lines where possible. The area thus outlined was incised deep to adipose tissue with a #15 scalpel blade. The skin margins were undermined to an appropriate distance in all directions utilizing iris scissors. Following this, the designed flap was carried over into the primary defect and sutured into place. Double O-Z Flap Text: The defect edges were debeveled with a #15 scalpel blade. Given the location of the defect, shape of the defect and the proximity to free margins a Double O-Z flap was deemed most appropriate. Using a sterile surgical marker, an appropriate transposition flap was drawn incorporating the defect and placing the expected incisions within the relaxed skin tension lines where possible. The area thus outlined was incised deep to adipose tissue with a #15 scalpel blade. The skin margins were undermined to an appropriate distance in all directions utilizing iris scissors. Following this, the designed flap was carried over into the primary defect and sutured into place. V-Y Flap Text: The defect edges were debeveled with a #15 scalpel blade.  Given the location of the defect, shape of the defect and the proximity to free margins a V-Y flap was deemed most appropriate.  Using a sterile surgical marker, an appropriate advancement flap was drawn incorporating the defect and placing the expected incisions within the relaxed skin tension lines where possible.    The area thus outlined was incised deep to adipose tissue with a #15 scalpel blade.  The skin margins were undermined to an appropriate distance in all directions utilizing iris scissors. Advancement-Rotation Flap Text: The defect edges were debeveled with a #15 scalpel blade.  Given the location of the defect, shape of the defect and the proximity to free margins an advancement-rotation flap was deemed most appropriate.  Using a sterile surgical marker, an appropriate flap was drawn incorporating the defect and placing the expected incisions within the relaxed skin tension lines where possible. The area thus outlined was incised deep to adipose tissue with a #15 scalpel blade.  The skin margins were undermined to an appropriate distance in all directions utilizing iris scissors. Mercedes Flap Text: The defect edges were debeveled with a #15 scalpel blade.  Given the location of the defect, shape of the defect and the proximity to free margins a Mercedes flap was deemed most appropriate.  Using a sterile surgical marker, an appropriate advancement flap was drawn incorporating the defect and placing the expected incisions within the relaxed skin tension lines where possible. The area thus outlined was incised deep to adipose tissue with a #15 scalpel blade.  The skin margins were undermined to an appropriate distance in all directions utilizing iris scissors. Modified Advancement Flap Text: The defect edges were debeveled with a #15 scalpel blade.  Given the location of the defect, shape of the defect and the proximity to free margins a modified advancement flap was deemed most appropriate.  Using a sterile surgical marker, an appropriate advancement flap was drawn incorporating the defect and placing the expected incisions within the relaxed skin tension lines where possible.    The area thus outlined was incised deep to adipose tissue with a #15 scalpel blade.  The skin margins were undermined to an appropriate distance in all directions utilizing iris scissors. Mucosal Advancement Flap Text: Given the location of the defect, shape of the defect and the proximity to free margins a mucosal advancement flap was deemed most appropriate. Incisions were made with a 15 blade scalpel in the appropriate fashion along the cutaneous vermilion border and the mucosal lip. The remaining actinically damaged mucosal tissue was excised.  The mucosal advancement flap was then elevated to the gingival sulcus with care taken to preserve the neurovascular structures and advanced into the primary defect. Care was taken to ensure that precise realignment of the vermilion border was achieved. Peng Advancement Flap Text: The defect edges were debeveled with a #15 scalpel blade. Given the location of the defect, shape of the defect and the proximity to free margins a Peng advancement flap was deemed most appropriate. Using a sterile surgical marker, an appropriate advancement flap was drawn incorporating the defect and placing the expected incisions within the relaxed skin tension lines where possible. The area thus outlined was incised deep to adipose tissue with a #15 scalpel blade. The skin margins were undermined to an appropriate distance in all directions utilizing iris scissors. Following this, the designed flap was advanced and carried over into the primary defect and sutured into place. Hatchet Flap Text: The defect edges were debeveled with a #15 scalpel blade.  Given the location of the defect, shape of the defect and the proximity to free margins a hatchet flap was deemed most appropriate.  Using a sterile surgical marker, an appropriate hatchet flap was drawn incorporating the defect and placing the expected incisions within the relaxed skin tension lines where possible.    The area thus outlined was incised deep to adipose tissue with a #15 scalpel blade.  The skin margins were undermined to an appropriate distance in all directions utilizing iris scissors. Rotation Flap Text: The defect edges were debeveled with a #15 scalpel blade.  Given the location of the defect, shape of the defect and the proximity to free margins a rotation flap was deemed most appropriate.  Using a sterile surgical marker, an appropriate rotation flap was drawn incorporating the defect and placing the expected incisions within the relaxed skin tension lines where possible.    The area thus outlined was incised deep to adipose tissue with a #15 scalpel blade.  The skin margins were undermined to an appropriate distance in all directions utilizing iris scissors. Bilateral Rotation Flap Text: The defect edges were debeveled with a #15 scalpel blade. Given the location of the defect, shape of the defect and the proximity to free margins a bilateral rotation flap was deemed most appropriate. Using a sterile surgical marker, an appropriate rotation flap was drawn incorporating the defect and placing the expected incisions within the relaxed skin tension lines where possible. The area thus outlined was incised deep to adipose tissue with a #15 scalpel blade. The skin margins were undermined to an appropriate distance in all directions utilizing iris scissors. Following this, the designed flap was carried over into the primary defect and sutured into place. Spiral Flap Text: The defect edges were debeveled with a #15 scalpel blade.  Given the location of the defect, shape of the defect and the proximity to free margins a spiral flap was deemed most appropriate.  Using a sterile surgical marker, an appropriate rotation flap was drawn incorporating the defect and placing the expected incisions within the relaxed skin tension lines where possible. The area thus outlined was incised deep to adipose tissue with a #15 scalpel blade.  The skin margins were undermined to an appropriate distance in all directions utilizing iris scissors. Staged Advancement Flap Text: The defect edges were debeveled with a #15 scalpel blade. Given the location of the defect, shape of the defect and the proximity to free margins a staged advancement flap was deemed most appropriate. Using a sterile surgical marker, an appropriate advancement flap was drawn incorporating the defect and placing the expected incisions within the relaxed skin tension lines where possible. The area thus outlined was incised deep to adipose tissue with a #15 scalpel blade. The skin margins were undermined to an appropriate distance in all directions utilizing iris scissors. Following this, the designed flap was carried over into the primary defect and sutured into place. Star Wedge Flap Text: The defect edges were debeveled with a #15 scalpel blade.  Given the location of the defect, shape of the defect and the proximity to free margins a star wedge flap was deemed most appropriate.  Using a sterile surgical marker, an appropriate rotation flap was drawn incorporating the defect and placing the expected incisions within the relaxed skin tension lines where possible. The area thus outlined was incised deep to adipose tissue with a #15 scalpel blade.  The skin margins were undermined to an appropriate distance in all directions utilizing iris scissors. Transposition Flap Text: The defect edges were debeveled with a #15 scalpel blade.  Given the location of the defect and the proximity to free margins a transposition flap was deemed most appropriate.  Using a sterile surgical marker, an appropriate transposition flap was drawn incorporating the defect.    The area thus outlined was incised deep to adipose tissue with a #15 scalpel blade.  The skin margins were undermined to an appropriate distance in all directions utilizing iris scissors. Muscle Hinge Flap Text: The defect edges were debeveled with a #15 scalpel blade.  Given the size, depth and location of the defect and the proximity to free margins a muscle hinge flap was deemed most appropriate.  Using a sterile surgical marker, an appropriate hinge flap was drawn incorporating the defect. The area thus outlined was incised with a #15 scalpel blade.  The skin margins were undermined to an appropriate distance in all directions utilizing iris scissors. Mustarde Flap Text: The defect edges were debeveled with a #15 scalpel blade.  Given the size, depth and location of the defect and the proximity to free margins a Mustarde flap was deemed most appropriate. Using a sterile surgical marker, an appropriate flap was drawn incorporating the defect. The area thus outlined was incised with a #15 scalpel blade. The skin margins were undermined to an appropriate distance in all directions utilizing iris scissors. Following this, the designed flap was carried into the primary defect and sutured into place. Nasal Turnover Hinge Flap Text: The defect edges were debeveled with a #15 scalpel blade.  Given the size, depth, location of the defect and the defect being full thickness a nasal turnover hinge flap was deemed most appropriate. Using a sterile surgical marker, an appropriate hinge flap was drawn incorporating the defect. The area thus outlined was incised with a #15 scalpel blade. The flap was designed to recreate the nasal mucosal lining and the alar rim. The skin margins were undermined to an appropriate distance in all directions utilizing iris scissors. Following this, the designed flap was carried over into the primary defect and sutured into place Nasalis-Muscle-Based Myocutaneous Island Pedicle Flap Text: Using a #15 blade, an incision was made around the donor flap to the level of the nasalis muscle. Wide lateral undermining was then performed in both the subcutaneous plane above the nasalis muscle, and in a submuscular plane just above periosteum. This allowed the formation of a free nasalis muscle axial pedicle (based on the angular artery) which was still attached to the actual cutaneous flap, increasing its mobility and vascular viability. Hemostasis was obtained with pinpoint electrocoagulation. The flap was mobilized into position and the pivotal anchor points positioned and stabilized with buried interrupted sutures. Subcutaneous and dermal tissues were closed in a multilayered fashion with sutures. Tissue redundancies were excised, and the epidermal edges were apposed without significant tension and sutured with sutures. Nasalis Myocutaneous Flap Text: Using a #15 blade, an incision was made around the donor flap to the level of the nasalis muscle. Wide lateral undermining was then performed in both the subcutaneous plane above the nasalis muscle, and in a submuscular plane just above periosteum. This allowed the formation of a free nasalis muscle axial pedicle which was still attached to the actual cutaneous flap, increasing its mobility and vascular viability. Hemostasis was obtained with pinpoint electrocoagulation. The flap was mobilized into position and the pivotal anchor points positioned and stabilized with buried interrupted sutures. Subcutaneous and dermal tissues were closed in a multilayered fashion with sutures. Tissue redundancies were excised, and the epidermal edges were apposed without significant tension and sutured with sutures. Nasolabial Transposition Flap Text: The defect edges were debeveled with a #15 scalpel blade.  Given the size, depth and location of the defect and the proximity to free margins a nasolabial transposition flap was deemed most appropriate. Using a sterile surgical marker, an appropriate flap was drawn incorporating the defect. The area thus outlined was incised with a #15 scalpel blade. The skin margins were undermined to an appropriate distance in all directions utilizing iris scissors. Following this, the designed flap was carried into the primary defect and sutured into place. Orbicularis Oris Muscle Flap Text: The defect edges were debeveled with a #15 scalpel blade.  Given that the defect affected the competency of the oral sphincter an orbicularis oris muscle flap was deemed most appropriate to restore this competency and normal muscle function.  Using a sterile surgical marker, an appropriate flap was drawn incorporating the defect. The area thus outlined was incised with a #15 scalpel blade. Following this, the designed flap was carried over into the primary defect and sutured into place. Melolabial Transposition Flap Text: The defect edges were debeveled with a #15 scalpel blade.  Given the location of the defect and the proximity to free margins a melolabial flap was deemed most appropriate.  Using a sterile surgical marker, an appropriate melolabial transposition flap was drawn incorporating the defect.    The area thus outlined was incised deep to adipose tissue with a #15 scalpel blade.  The skin margins were undermined to an appropriate distance in all directions utilizing iris scissors. Rectangular Flap Text: The defect edges were debeveled with a #15 scalpel blade. Given the location of the defect and the proximity to free margins a rectangular flap was deemed most appropriate. Using a sterile surgical marker, an appropriate rectangular flap was drawn incorporating the defect. The area thus outlined was incised deep to adipose tissue with a #15 scalpel blade. The skin margins were undermined to an appropriate distance in all directions utilizing iris scissors. Following this, the designed flap was carried over into the primary defect and sutured into place. Rhombic Flap Text: The defect edges were debeveled with a #15 scalpel blade.  Given the location of the defect and the proximity to free margins a rhombic flap was deemed most appropriate.  Using a sterile surgical marker, an appropriate rhombic flap was drawn incorporating the defect.    The area thus outlined was incised deep to adipose tissue with a #15 scalpel blade.  The skin margins were undermined to an appropriate distance in all directions utilizing iris scissors. Rhomboid Transposition Flap Text: The defect edges were debeveled with a #15 scalpel blade. Given the location of the defect and the proximity to free margins a rhomboid transposition flap was deemed most appropriate. Using a sterile surgical marker, an appropriate rhomboid flap was drawn incorporating the defect. The area thus outlined was incised deep to adipose tissue with a #15 scalpel blade. The skin margins were undermined to an appropriate distance in all directions utilizing iris scissors. Following this, the designed flap was carried over into the primary defect and sutured into place. Bi-Rhombic Flap Text: The defect edges were debeveled with a #15 scalpel blade.  Given the location of the defect and the proximity to free margins a bi-rhombic flap was deemed most appropriate.  Using a sterile surgical marker, an appropriate rhombic flap was drawn incorporating the defect. The area thus outlined was incised deep to adipose tissue with a #15 scalpel blade.  The skin margins were undermined to an appropriate distance in all directions utilizing iris scissors. Helical Rim Advancement Flap Text: The defect edges were debeveled with a #15 blade scalpel.  Given the location of the defect and the proximity to free margins (helical rim) a double helical rim advancement flap was deemed most appropriate.  Using a sterile surgical marker, the appropriate advancement flaps were drawn incorporating the defect and placing the expected incisions between the helical rim and antihelix where possible.  The area thus outlined was incised through and through with a #15 scalpel blade.  With a skin hook and iris scissors, the flaps were gently and sharply undermined and freed up. Bilateral Helical Rim Advancement Flap Text: The defect edges were debeveled with a #15 blade scalpel.  Given the location of the defect and the proximity to free margins (helical rim) a bilateral helical rim advancement flap was deemed most appropriate.  Using a sterile surgical marker, the appropriate advancement flaps were drawn incorporating the defect and placing the expected incisions between the helical rim and antihelix where possible.  The area thus outlined was incised through and through with a #15 scalpel blade.  With a skin hook and iris scissors, the flaps were gently and sharply undermined and freed up. Ear Star Wedge Flap Text: The defect edges were debeveled with a #15 blade scalpel.  Given the location of the defect and the proximity to free margins (helical rim) an ear star wedge flap was deemed most appropriate.  Using a sterile surgical marker, the appropriate flap was drawn incorporating the defect and placing the expected incisions between the helical rim and antihelix where possible.  The area thus outlined was incised through and through with a #15 scalpel blade. Banner Transposition Flap Text: The defect edges were debeveled with a #15 scalpel blade.  Given the location of the defect and the proximity to free margins a Banner transposition flap was deemed most appropriate.  Using a sterile surgical marker, an appropriate flap drawn around the defect. The area thus outlined was incised deep to adipose tissue with a #15 scalpel blade.  The skin margins were undermined to an appropriate distance in all directions utilizing iris scissors. Bilobed Flap Text: The defect edges were debeveled with a #15 scalpel blade.  Given the location of the defect and the proximity to free margins a bilobe flap was deemed most appropriate.  Using a sterile surgical marker, an appropriate bilobe flap drawn around the defect.    The area thus outlined was incised deep to adipose tissue with a #15 scalpel blade.  The skin margins were undermined to an appropriate distance in all directions utilizing iris scissors. Bilobed Transposition Flap Text: The defect edges were debeveled with a #15 scalpel blade.  Given the location of the defect and the proximity to free margins a bilobed transposition flap was deemed most appropriate.  Using a sterile surgical marker, an appropriate bilobe flap drawn around the defect.    The area thus outlined was incised deep to adipose tissue with a #15 scalpel blade.  The skin margins were undermined to an appropriate distance in all directions utilizing iris scissors. Trilobed Flap Text: The defect edges were debeveled with a #15 scalpel blade.  Given the location of the defect and the proximity to free margins a trilobed flap was deemed most appropriate.  Using a sterile surgical marker, an appropriate trilobed flap drawn around the defect.    The area thus outlined was incised deep to adipose tissue with a #15 scalpel blade.  The skin margins were undermined to an appropriate distance in all directions utilizing iris scissors. Dorsal Nasal Flap Text: The defect edges were debeveled with a #15 scalpel blade.  Given the location of the defect and the proximity to free margins a dorsal nasal flap was deemed most appropriate.  Using a sterile surgical marker, an appropriate dorsal nasal flap was drawn around the defect.    The area thus outlined was incised deep to adipose tissue with a #15 scalpel blade.  The skin margins were undermined to an appropriate distance in all directions utilizing iris scissors. Island Pedicle Flap Text: The defect edges were debeveled with a #15 scalpel blade.  Given the location of the defect, shape of the defect and the proximity to free margins an island pedicle advancement flap was deemed most appropriate.  Using a sterile surgical marker, an appropriate advancement flap was drawn incorporating the defect, outlining the appropriate donor tissue and placing the expected incisions within the relaxed skin tension lines where possible.    The area thus outlined was incised deep to adipose tissue with a #15 scalpel blade.  The skin margins were undermined to an appropriate distance in all directions around the primary defect and laterally outward around the island pedicle utilizing iris scissors.  There was minimal undermining beneath the pedicle flap. Island Pedicle Flap With Canthal Suspension Text: The defect edges were debeveled with a #15 scalpel blade.  Given the location of the defect, shape of the defect and the proximity to free margins an island pedicle advancement flap was deemed most appropriate.  Using a sterile surgical marker, an appropriate advancement flap was drawn incorporating the defect, outlining the appropriate donor tissue and placing the expected incisions within the relaxed skin tension lines where possible. The area thus outlined was incised deep to adipose tissue with a #15 scalpel blade.  The skin margins were undermined to an appropriate distance in all directions around the primary defect and laterally outward around the island pedicle utilizing iris scissors.  There was minimal undermining beneath the pedicle flap. A suspension suture was placed in the canthal tendon to prevent tension and prevent ectropion. Alar Island Pedicle Flap Text: The defect edges were debeveled with a #15 scalpel blade.  Given the location of the defect, shape of the defect and the proximity to the alar rim an island pedicle advancement flap was deemed most appropriate.  Using a sterile surgical marker, an appropriate advancement flap was drawn incorporating the defect, outlining the appropriate donor tissue and placing the expected incisions within the nasal ala running parallel to the alar rim. The area thus outlined was incised with a #15 scalpel blade.  The skin margins were undermined minimally to an appropriate distance in all directions around the primary defect and laterally outward around the island pedicle utilizing iris scissors.  There was minimal undermining beneath the pedicle flap. Double Island Pedicle Flap Text: The defect edges were debeveled with a #15 scalpel blade.  Given the location of the defect, shape of the defect and the proximity to free margins a double island pedicle advancement flap was deemed most appropriate.  Using a sterile surgical marker, an appropriate advancement flap was drawn incorporating the defect, outlining the appropriate donor tissue and placing the expected incisions within the relaxed skin tension lines where possible.    The area thus outlined was incised deep to adipose tissue with a #15 scalpel blade.  The skin margins were undermined to an appropriate distance in all directions around the primary defect and laterally outward around the island pedicle utilizing iris scissors.  There was minimal undermining beneath the pedicle flap. Island Pedicle Flap-Requiring Vessel Identification Text: The defect edges were debeveled with a #15 scalpel blade.  Given the location of the defect, shape of the defect and the proximity to free margins an island pedicle advancement flap was deemed most appropriate.  Using a sterile surgical marker, an appropriate advancement flap was drawn, based on the axial vessel mentioned above, incorporating the defect, outlining the appropriate donor tissue and placing the expected incisions within the relaxed skin tension lines where possible.    The area thus outlined was incised deep to adipose tissue with a #15 scalpel blade.  The skin margins were undermined to an appropriate distance in all directions around the primary defect and laterally outward around the island pedicle utilizing iris scissors.  There was minimal undermining beneath the pedicle flap. Keystone Flap Text: The defect edges were debeveled with a #15 scalpel blade.  Given the location of the defect, shape of the defect a keystone flap was deemed most appropriate.  Using a sterile surgical marker, an appropriate keystone flap was drawn incorporating the defect, outlining the appropriate donor tissue and placing the expected incisions within the relaxed skin tension lines where possible. The area thus outlined was incised deep to adipose tissue with a #15 scalpel blade.  The skin margins were undermined to an appropriate distance in all directions around the primary defect and laterally outward around the flap utilizing iris scissors. O-T Plasty Text: The defect edges were debeveled with a #15 scalpel blade.  Given the location of the defect, shape of the defect and the proximity to free margins an O-T plasty was deemed most appropriate.  Using a sterile surgical marker, an appropriate O-T plasty was drawn incorporating the defect and placing the expected incisions within the relaxed skin tension lines where possible.    The area thus outlined was incised deep to adipose tissue with a #15 scalpel blade.  The skin margins were undermined to an appropriate distance in all directions utilizing iris scissors. O-Z Plasty Text: The defect edges were debeveled with a #15 scalpel blade.  Given the location of the defect, shape of the defect and the proximity to free margins an O-Z plasty (double transposition flap) was deemed most appropriate.  Using a sterile surgical marker, the appropriate transposition flaps were drawn incorporating the defect and placing the expected incisions within the relaxed skin tension lines where possible.    The area thus outlined was incised deep to adipose tissue with a #15 scalpel blade.  The skin margins were undermined to an appropriate distance in all directions utilizing iris scissors.  Hemostasis was achieved with electrocautery.  The flaps were then transposed into place, one clockwise and the other counterclockwise, and anchored with interrupted buried subcutaneous sutures. Double O-Z Plasty Text: The defect edges were debeveled with a #15 scalpel blade. Given the location of the defect, shape of the defect and the proximity to free margins a Double O-Z plasty (double transposition flap) was deemed most appropriate. Using a sterile surgical marker, the appropriate transposition flaps were drawn incorporating the defect and placing the expected incisions within the relaxed skin tension lines where possible. The area thus outlined was incised deep to adipose tissue with a #15 scalpel blade. The skin margins were undermined to an appropriate distance in all directions utilizing iris scissors. Hemostasis was achieved with electrocautery. The flaps were then transposed and carried over into place, one clockwise and the other counterclockwise, and anchored with interrupted buried subcutaneous sutures. V-Y Plasty Text: The defect edges were debeveled with a #15 scalpel blade.  Given the location of the defect, shape of the defect and the proximity to free margins an V-Y advancement flap was deemed most appropriate.  Using a sterile surgical marker, an appropriate advancement flap was drawn incorporating the defect and placing the expected incisions within the relaxed skin tension lines where possible.    The area thus outlined was incised deep to adipose tissue with a #15 scalpel blade.  The skin margins were undermined to an appropriate distance in all directions utilizing iris scissors. H Plasty Text: Given the location of the defect, shape of the defect and the proximity to free margins a H-plasty was deemed most appropriate for repair.  Using a sterile surgical marker, the appropriate advancement arms of the H-plasty were drawn incorporating the defect and placing the expected incisions within the relaxed skin tension lines where possible. The area thus outlined was incised deep to adipose tissue with a #15 scalpel blade. The skin margins were undermined to an appropriate distance in all directions utilizing iris scissors.  The opposing advancement arms were then advanced into place in opposite direction and anchored with interrupted buried subcutaneous sutures. W Plasty Text: The lesion was extirpated to the level of the fat with a #15 scalpel blade.  Given the location of the defect, shape of the defect and the proximity to free margins a W-plasty was deemed most appropriate for repair.  Using a sterile surgical marker, the appropriate transposition arms of the W-plasty were drawn incorporating the defect and placing the expected incisions within the relaxed skin tension lines where possible.    The area thus outlined was incised deep to adipose tissue with a #15 scalpel blade.  The skin margins were undermined to an appropriate distance in all directions utilizing iris scissors.  The opposing transposition arms were then transposed into place in opposite direction and anchored with interrupted buried subcutaneous sutures. Z Plasty Text: The lesion was extirpated to the level of the fat with a #15 scalpel blade.  Given the location of the defect, shape of the defect and the proximity to free margins a Z-plasty was deemed most appropriate for repair.  Using a sterile surgical marker, the appropriate transposition arms of the Z-plasty were drawn incorporating the defect and placing the expected incisions within the relaxed skin tension lines where possible.    The area thus outlined was incised deep to adipose tissue with a #15 scalpel blade.  The skin margins were undermined to an appropriate distance in all directions utilizing iris scissors.  The opposing transposition arms were then transposed into place in opposite direction and anchored with interrupted buried subcutaneous sutures. Double Z Plasty Text: The lesion was extirpated to the level of the fat with a #15 scalpel blade. Given the location of the defect, shape of the defect and the proximity to free margins a double Z-plasty was deemed most appropriate for repair. Using a sterile surgical marker, the appropriate transposition arms of the double Z-plasty were drawn incorporating the defect and placing the expected incisions within the relaxed skin tension lines where possible. The area thus outlined was incised deep to adipose tissue with a #15 scalpel blade. The skin margins were undermined to an appropriate distance in all directions utilizing iris scissors. The opposing transposition arms were then transposed and carried over into place in opposite direction and anchored with interrupted buried subcutaneous sutures. Zygomaticofacial Flap Text: Given the location of the defect, shape of the defect and the proximity to free margins a zygomaticofacial flap was deemed most appropriate for repair. Using a sterile surgical marker, the appropriate flap was drawn incorporating the defect and placing the expected incisions within the relaxed skin tension lines where possible. The area thus outlined was incised deep to adipose tissue with a #15 scalpel blade with preservation of a vascular pedicle.  The skin margins were undermined to an appropriate distance in all directions utilizing iris scissors. The flap was then carried over into the defect and anchored with interrupted buried subcutaneous sutures. Cheek Interpolation Flap Text: A decision was made to reconstruct the defect utilizing an interpolation axial flap and a staged reconstruction.  A telfa template was made of the defect.  This telfa template was then used to outline the Cheek Interpolation flap.  The donor area for the pedicle flap was then injected with anesthesia.  The flap was excised through the skin and subcutaneous tissue down to the layer of the underlying musculature.  The interpolation flap was carefully excised within this deep plane to maintain its blood supply.  The edges of the donor site were undermined.   The donor site was closed in a primary fashion.  The pedicle was then rotated into position and sutured.  Once the tube was sutured into place, adequate blood supply was confirmed with blanching and refill.  The pedicle was then wrapped with xeroform gauze and dressed appropriately with a telfa and gauze bandage to ensure continued blood supply and protect the attached pedicle. Cheek-To-Nose Interpolation Flap Text: A decision was made to reconstruct the defect utilizing an interpolation axial flap and a staged reconstruction.  A telfa template was made of the defect.  This telfa template was then used to outline the Cheek-To-Nose Interpolation flap.  The donor area for the pedicle flap was then injected with anesthesia.  The flap was excised through the skin and subcutaneous tissue down to the layer of the underlying musculature.  The interpolation flap was carefully excised within this deep plane to maintain its blood supply.  The edges of the donor site were undermined.   The donor site was closed in a primary fashion.  The pedicle was then rotated into position and sutured.  Once the tube was sutured into place, adequate blood supply was confirmed with blanching and refill.  The pedicle was then wrapped with xeroform gauze and dressed appropriately with a telfa and gauze bandage to ensure continued blood supply and protect the attached pedicle. Interpolation Flap Text: A decision was made to reconstruct the defect utilizing an interpolation axial flap and a staged reconstruction.  A telfa template was made of the defect.  This telfa template was then used to outline the interpolation flap.  The donor area for the pedicle flap was then injected with anesthesia.  The flap was excised through the skin and subcutaneous tissue down to the layer of the underlying musculature.  The interpolation flap was carefully excised within this deep plane to maintain its blood supply.  The edges of the donor site were undermined.   The donor site was closed in a primary fashion.  The pedicle was then rotated into position and sutured.  Once the tube was sutured into place, adequate blood supply was confirmed with blanching and refill.  The pedicle was then wrapped with xeroform gauze and dressed appropriately with a telfa and gauze bandage to ensure continued blood supply and protect the attached pedicle. Melolabial Interpolation Flap Text: A decision was made to reconstruct the defect utilizing an interpolation axial flap and a staged reconstruction.  A telfa template was made of the defect.  This telfa template was then used to outline the melolabial interpolation flap.  The donor area for the pedicle flap was then injected with anesthesia.  The flap was excised through the skin and subcutaneous tissue down to the layer of the underlying musculature.  The pedicle flap was carefully excised within this deep plane to maintain its blood supply.  The edges of the donor site were undermined.   The donor site was closed in a primary fashion.  The pedicle was then rotated into position and sutured.  Once the tube was sutured into place, adequate blood supply was confirmed with blanching and refill.  The pedicle was then wrapped with xeroform gauze and dressed appropriately with a telfa and gauze bandage to ensure continued blood supply and protect the attached pedicle. Mastoid Interpolation Flap Text: A decision was made to reconstruct the defect utilizing an interpolation axial flap and a staged reconstruction.  A telfa template was made of the defect.  This telfa template was then used to outline the mastoid interpolation flap.  The donor area for the pedicle flap was then injected with anesthesia.  The flap was excised through the skin and subcutaneous tissue down to the layer of the underlying musculature.  The pedicle flap was carefully excised within this deep plane to maintain its blood supply.  The edges of the donor site were undermined.   The donor site was closed in a primary fashion.  The pedicle was then rotated into position and sutured.  Once the tube was sutured into place, adequate blood supply was confirmed with blanching and refill.  The pedicle was then wrapped with xeroform gauze and dressed appropriately with a telfa and gauze bandage to ensure continued blood supply and protect the attached pedicle. Posterior Auricular Interpolation Flap Text: A decision was made to reconstruct the defect utilizing an interpolation axial flap and a staged reconstruction.  A telfa template was made of the defect.  This telfa template was then used to outline the posterior auricular interpolation flap.  The donor area for the pedicle flap was then injected with anesthesia.  The flap was excised through the skin and subcutaneous tissue down to the layer of the underlying musculature.  The pedicle flap was carefully excised within this deep plane to maintain its blood supply.  The edges of the donor site were undermined.   The donor site was closed in a primary fashion.  The pedicle was then rotated into position and sutured.  Once the tube was sutured into place, adequate blood supply was confirmed with blanching and refill.  The pedicle was then wrapped with xeroform gauze and dressed appropriately with a telfa and gauze bandage to ensure continued blood supply and protect the attached pedicle. Paramedian Forehead Flap Text: A decision was made to reconstruct the defect utilizing an interpolation axial flap and a staged reconstruction.  A telfa template was made of the defect.  This telfa template was then used to outline the paramedian forehead pedicle flap.  The donor area for the pedicle flap was then injected with anesthesia.  The flap was excised through the skin and subcutaneous tissue down to the layer of the underlying musculature.  The pedicle flap was carefully excised within this deep plane to maintain its blood supply.  The edges of the donor site were undermined.   The donor site was closed in a primary fashion.  The pedicle was then rotated into position and sutured.  Once the tube was sutured into place, adequate blood supply was confirmed with blanching and refill.  The pedicle was then wrapped with xeroform gauze and dressed appropriately with a telfa and gauze bandage to ensure continued blood supply and protect the attached pedicle. Abbe Flap (Upper To Lower Lip) Text: The defect of the lower lip was assessed and measured.  Given the location and size of the defect, an Abbe flap was deemed most appropriate. Using a sterile surgical marker, an appropriate Abbe flap was measured and drawn on the upper lip. Local anesthesia was then infiltrated.  A scalpel was then used to incise the upper lip through and through the skin, vermilion, muscle and mucosa, leaving the flap pedicled on the opposite side.  The flap was then rotated and transferred to the lower lip defect.  The flap was then sutured into place with a three layer technique, closing the orbicularis oris muscle layer with subcutaneous buried sutures, followed by a mucosal layer and an epidermal layer. Abbe Flap (Lower To Upper Lip) Text: The defect of the upper lip was assessed and measured.  Given the location and size of the defect, an Abbe flap was deemed most appropriate. Using a sterile surgical marker, an appropriate Abbe flap was measured and drawn on the lower lip. Local anesthesia was then infiltrated. A scalpel was then used to incise the upper lip through and through the skin, vermilion, muscle and mucosa, leaving the flap pedicled on the opposite side.  The flap was then rotated and transferred to the lower lip defect.  The flap was then sutured into place with a three layer technique, closing the orbicularis oris muscle layer with subcutaneous buried sutures, followed by a mucosal layer and an epidermal layer. Estlander Flap (Upper To Lower Lip) Text: The defect of the lower lip was assessed and measured.  Given the location and size of the defect, an Estlander flap was deemed most appropriate. Using a sterile surgical marker, an appropriate Estlander flap was measured and drawn on the upper lip. Local anesthesia was then infiltrated. A scalpel was then used to incise the lateral aspect of the flap, through skin, muscle and mucosa, leaving the flap pedicled medially.  The flap was then rotated and positioned to fill the lower lip defect.  The flap was then sutured into place with a three layer technique, closing the orbicularis oris muscle layer with subcutaneous buried sutures, followed by a mucosal layer and an epidermal layer. Lip Wedge Excision Repair Text: Given the location of the defect and the proximity to free margins a full thickness wedge repair was deemed most appropriate.  Using a sterile surgical marker, the appropriate repair was drawn incorporating the defect and placing the expected incisions perpendicular to the vermillion border.  The vermillion border was also meticulously outlined to ensure appropriate reapproximation during the repair.  The area thus outlined was incised through and through with a #15 scalpel blade.  The muscularis and dermis were reaproximated with deep sutures following hemostasis. Care was taken to realign the vermillion border before proceeding with the superficial closure.  Once the vermillion was realigned the superfical and mucosal closure was finished. Ftsg Text: The defect edges were debeveled with a #15 scalpel blade.  Given the location of the defect, shape of the defect and the proximity to free margins a full thickness skin graft was deemed most appropriate.  Using a sterile surgical marker, the primary defect shape was transferred to the donor site. The area thus outlined was incised deep to adipose tissue with a #15 scalpel blade.  The harvested graft was then trimmed of adipose tissue until only dermis and epidermis was left.  The skin margins of the secondary defect were undermined to an appropriate distance in all directions utilizing iris scissors.  The secondary defect was closed with interrupted buried subcutaneous sutures.  The skin edges were then re-apposed with running  sutures.  The skin graft was then placed in the primary defect and oriented appropriately. Split-Thickness Skin Graft Text: The defect edges were debeveled with a #15 scalpel blade.  Given the location of the defect, shape of the defect and the proximity to free margins a split thickness skin graft was deemed most appropriate.  Using a sterile surgical marker, the primary defect shape was transferred to the donor site. The split thickness graft was then harvested.  The skin graft was then placed in the primary defect and oriented appropriately. Pinch Graft Text: The defect edges were debeveled with a #15 scalpel blade. Given the location of the defect, shape of the defect and the proximity to free margins a pinch graft was deemed most appropriate. Using a sterile surgical marker, the primary defect shape was transferred to the donor site. The area thus outlined was incised deep to adipose tissue with a #15 scalpel blade.  The harvested graft was then trimmed of adipose tissue until only dermis and epidermis was left. The skin margins of the secondary defect were undermined to an appropriate distance in all directions utilizing iris scissors.  The secondary defect was closed with interrupted buried subcutaneous sutures.  The skin edges were then re-apposed with running  sutures.  The skin graft was then placed in the primary defect and oriented appropriately. Burow's Graft Text: The defect edges were debeveled with a #15 scalpel blade. Given the location of the defect, shape of the defect, the proximity to free margins and the presence of a standing cone deformity a Burow's skin graft was deemed most appropriate. The standing cone was removed and this tissue was then trimmed to the shape of the primary defect. The adipose tissue was also removed until only dermis and epidermis were left.  The skin margins of the secondary defect were undermined to an appropriate distance in all directions utilizing iris scissors.  The secondary defect was closed with interrupted buried subcutaneous sutures.  The skin edges were then re-apposed with running  sutures.  The skin graft was then placed in the primary defect and oriented appropriately. Cartilage Graft Text: The defect edges were debeveled with a #15 scalpel blade.  Given the location of the defect, shape of the defect, the fact the defect involved a full thickness cartilage defect a cartilage graft was deemed most appropriate.  An appropriate donor site was identified, cleansed, and anesthetized. The cartilage graft was then harvested and transferred to the recipient site, oriented appropriately and then sutured into place.  The secondary defect was then repaired using a primary closure. Composite Graft Text: The defect edges were debeveled with a #15 scalpel blade.  Given the location of the defect, shape of the defect, the proximity to free margins and the fact the defect was full thickness a composite graft was deemed most appropriate.  The defect was outline and then transferred to the donor site.  A full thickness graft was then excised from the donor site. The graft was then placed in the primary defect, oriented appropriately and then sutured into place.  The secondary defect was then repaired using a primary closure. Epidermal Autograft Text: The defect edges were debeveled with a #15 scalpel blade.  Given the location of the defect, shape of the defect and the proximity to free margins an epidermal autograft was deemed most appropriate.  Using a sterile surgical marker, the primary defect shape was transferred to the donor site. The epidermal graft was then harvested.  The skin graft was then placed in the primary defect and oriented appropriately. Dermal Autograft Text: The defect edges were debeveled with a #15 scalpel blade.  Given the location of the defect, shape of the defect and the proximity to free margins a dermal autograft was deemed most appropriate.  Using a sterile surgical marker, the primary defect shape was transferred to the donor site. The area thus outlined was incised deep to adipose tissue with a #15 scalpel blade.  The harvested graft was then trimmed of adipose and epidermal tissue until only dermis was left.  The skin graft was then placed in the primary defect and oriented appropriately. Skin Substitute Text: The defect edges were debeveled with a #15 scalpel blade.  Given the location of the defect, shape of the defect and the proximity to free margins a skin substitute graft was deemed most appropriate.  The graft material was trimmed to fit the size of the defect. The graft was then placed in the primary defect and oriented appropriately. Tissue Cultured Epidermal Autograft Text: The defect edges were debeveled with a #15 scalpel blade.  Given the location of the defect, shape of the defect and the proximity to free margins a tissue cultured epidermal autograft was deemed most appropriate.  The graft was then trimmed to fit the size of the defect.  The graft was then placed in the primary defect and oriented appropriately. Xenograft Text: The defect edges were debeveled with a #15 scalpel blade.  Given the location of the defect, shape of the defect and the proximity to free margins a xenograft was deemed most appropriate.  The graft was then trimmed to fit the size of the defect.  The graft was then placed in the primary defect and oriented appropriately. Purse String (Intermediate) Text: Given the location of the defect and the characteristics of the surrounding skin a pursestring intermediate closure was deemed most appropriate.  Undermining was performed circumfirentially around the surgical defect.  A purstring suture was then placed and tightened. Purse String (Simple) Text: Given the location of the defect and the characteristics of the surrounding skin a purse string simple closure was deemed most appropriate.  Undermining was performed circumferentially around the surgical defect.  A purse string suture was then placed and tightened. Partial Purse String (Intermediate) Text: Given the location of the defect and the characteristics of the surrounding skin an intermediate purse string closure was deemed most appropriate.  Undermining was performed circumferentially around the surgical defect.  A purse string suture was then placed and tightened. Wound tension of the circular defect prevented complete closure of the wound. Partial Purse String (Simple) Text: Given the location of the defect and the characteristics of the surrounding skin a simple purse string closure was deemed most appropriate.  Undermining was performed circumferentially around the surgical defect.  A purse string suture was then placed and tightened. Wound tension of the circular defect prevented complete closure of the wound. Complex Repair And Single Advancement Flap Text: The defect edges were debeveled with a #15 scalpel blade.  The primary defect was closed partially with a complex linear closure.  Given the location of the remaining defect, shape of the defect and the proximity to free margins a single advancement flap was deemed most appropriate for complete closure of the defect.  Using a sterile surgical marker, an appropriate advancement flap was drawn incorporating the defect and placing the expected incisions within the relaxed skin tension lines where possible.    The area thus outlined was incised deep to adipose tissue with a #15 scalpel blade.  The skin margins were undermined to an appropriate distance in all directions utilizing iris scissors. Complex Repair And Double Advancement Flap Text: The defect edges were debeveled with a #15 scalpel blade.  The primary defect was closed partially with a complex linear closure.  Given the location of the remaining defect, shape of the defect and the proximity to free margins a double advancement flap was deemed most appropriate for complete closure of the defect.  Using a sterile surgical marker, an appropriate advancement flap was drawn incorporating the defect and placing the expected incisions within the relaxed skin tension lines where possible.    The area thus outlined was incised deep to adipose tissue with a #15 scalpel blade.  The skin margins were undermined to an appropriate distance in all directions utilizing iris scissors. Complex Repair And Modified Advancement Flap Text: The defect edges were debeveled with a #15 scalpel blade.  The primary defect was closed partially with a complex linear closure.  Given the location of the remaining defect, shape of the defect and the proximity to free margins a modified advancement flap was deemed most appropriate for complete closure of the defect.  Using a sterile surgical marker, an appropriate advancement flap was drawn incorporating the defect and placing the expected incisions within the relaxed skin tension lines where possible.    The area thus outlined was incised deep to adipose tissue with a #15 scalpel blade.  The skin margins were undermined to an appropriate distance in all directions utilizing iris scissors. Complex Repair And A-T Advancement Flap Text: The defect edges were debeveled with a #15 scalpel blade.  The primary defect was closed partially with a complex linear closure.  Given the location of the remaining defect, shape of the defect and the proximity to free margins an A-T advancement flap was deemed most appropriate for complete closure of the defect.  Using a sterile surgical marker, an appropriate advancement flap was drawn incorporating the defect and placing the expected incisions within the relaxed skin tension lines where possible.    The area thus outlined was incised deep to adipose tissue with a #15 scalpel blade.  The skin margins were undermined to an appropriate distance in all directions utilizing iris scissors. Complex Repair And O-T Advancement Flap Text: The defect edges were debeveled with a #15 scalpel blade.  The primary defect was closed partially with a complex linear closure.  Given the location of the remaining defect, shape of the defect and the proximity to free margins an O-T advancement flap was deemed most appropriate for complete closure of the defect.  Using a sterile surgical marker, an appropriate advancement flap was drawn incorporating the defect and placing the expected incisions within the relaxed skin tension lines where possible.    The area thus outlined was incised deep to adipose tissue with a #15 scalpel blade.  The skin margins were undermined to an appropriate distance in all directions utilizing iris scissors. Complex Repair And O-L Flap Text: The defect edges were debeveled with a #15 scalpel blade.  The primary defect was closed partially with a complex linear closure.  Given the location of the remaining defect, shape of the defect and the proximity to free margins an O-L flap was deemed most appropriate for complete closure of the defect.  Using a sterile surgical marker, an appropriate flap was drawn incorporating the defect and placing the expected incisions within the relaxed skin tension lines where possible.    The area thus outlined was incised deep to adipose tissue with a #15 scalpel blade.  The skin margins were undermined to an appropriate distance in all directions utilizing iris scissors. Complex Repair And Bilobe Flap Text: The defect edges were debeveled with a #15 scalpel blade.  The primary defect was closed partially with a complex linear closure.  Given the location of the remaining defect, shape of the defect and the proximity to free margins a bilobe flap was deemed most appropriate for complete closure of the defect.  Using a sterile surgical marker, an appropriate advancement flap was drawn incorporating the defect and placing the expected incisions within the relaxed skin tension lines where possible.    The area thus outlined was incised deep to adipose tissue with a #15 scalpel blade.  The skin margins were undermined to an appropriate distance in all directions utilizing iris scissors. Complex Repair And Melolabial Flap Text: The defect edges were debeveled with a #15 scalpel blade.  The primary defect was closed partially with a complex linear closure.  Given the location of the remaining defect, shape of the defect and the proximity to free margins a melolabial flap was deemed most appropriate for complete closure of the defect.  Using a sterile surgical marker, an appropriate advancement flap was drawn incorporating the defect and placing the expected incisions within the relaxed skin tension lines where possible.    The area thus outlined was incised deep to adipose tissue with a #15 scalpel blade.  The skin margins were undermined to an appropriate distance in all directions utilizing iris scissors. Complex Repair And Rotation Flap Text: The defect edges were debeveled with a #15 scalpel blade.  The primary defect was closed partially with a complex linear closure.  Given the location of the remaining defect, shape of the defect and the proximity to free margins a rotation flap was deemed most appropriate for complete closure of the defect.  Using a sterile surgical marker, an appropriate advancement flap was drawn incorporating the defect and placing the expected incisions within the relaxed skin tension lines where possible.    The area thus outlined was incised deep to adipose tissue with a #15 scalpel blade.  The skin margins were undermined to an appropriate distance in all directions utilizing iris scissors. Complex Repair And Rhombic Flap Text: The defect edges were debeveled with a #15 scalpel blade.  The primary defect was closed partially with a complex linear closure.  Given the location of the remaining defect, shape of the defect and the proximity to free margins a rhombic flap was deemed most appropriate for complete closure of the defect.  Using a sterile surgical marker, an appropriate advancement flap was drawn incorporating the defect and placing the expected incisions within the relaxed skin tension lines where possible.    The area thus outlined was incised deep to adipose tissue with a #15 scalpel blade.  The skin margins were undermined to an appropriate distance in all directions utilizing iris scissors. Complex Repair And Transposition Flap Text: The defect edges were debeveled with a #15 scalpel blade.  The primary defect was closed partially with a complex linear closure.  Given the location of the remaining defect, shape of the defect and the proximity to free margins a transposition flap was deemed most appropriate for complete closure of the defect.  Using a sterile surgical marker, an appropriate advancement flap was drawn incorporating the defect and placing the expected incisions within the relaxed skin tension lines where possible.    The area thus outlined was incised deep to adipose tissue with a #15 scalpel blade.  The skin margins were undermined to an appropriate distance in all directions utilizing iris scissors. Complex Repair And V-Y Plasty Text: The defect edges were debeveled with a #15 scalpel blade.  The primary defect was closed partially with a complex linear closure.  Given the location of the remaining defect, shape of the defect and the proximity to free margins a V-Y plasty was deemed most appropriate for complete closure of the defect.  Using a sterile surgical marker, an appropriate advancement flap was drawn incorporating the defect and placing the expected incisions within the relaxed skin tension lines where possible.    The area thus outlined was incised deep to adipose tissue with a #15 scalpel blade.  The skin margins were undermined to an appropriate distance in all directions utilizing iris scissors. Complex Repair And M Plasty Text: The defect edges were debeveled with a #15 scalpel blade.  The primary defect was closed partially with a complex linear closure.  Given the location of the remaining defect, shape of the defect and the proximity to free margins an M plasty was deemed most appropriate for complete closure of the defect.  Using a sterile surgical marker, an appropriate advancement flap was drawn incorporating the defect and placing the expected incisions within the relaxed skin tension lines where possible.    The area thus outlined was incised deep to adipose tissue with a #15 scalpel blade.  The skin margins were undermined to an appropriate distance in all directions utilizing iris scissors. Complex Repair And Double M Plasty Text: The defect edges were debeveled with a #15 scalpel blade.  The primary defect was closed partially with a complex linear closure.  Given the location of the remaining defect, shape of the defect and the proximity to free margins a double M plasty was deemed most appropriate for complete closure of the defect.  Using a sterile surgical marker, an appropriate advancement flap was drawn incorporating the defect and placing the expected incisions within the relaxed skin tension lines where possible.    The area thus outlined was incised deep to adipose tissue with a #15 scalpel blade.  The skin margins were undermined to an appropriate distance in all directions utilizing iris scissors. Complex Repair And W Plasty Text: The defect edges were debeveled with a #15 scalpel blade.  The primary defect was closed partially with a complex linear closure.  Given the location of the remaining defect, shape of the defect and the proximity to free margins a W plasty was deemed most appropriate for complete closure of the defect.  Using a sterile surgical marker, an appropriate advancement flap was drawn incorporating the defect and placing the expected incisions within the relaxed skin tension lines where possible.    The area thus outlined was incised deep to adipose tissue with a #15 scalpel blade.  The skin margins were undermined to an appropriate distance in all directions utilizing iris scissors. Complex Repair And Z Plasty Text: The defect edges were debeveled with a #15 scalpel blade.  The primary defect was closed partially with a complex linear closure.  Given the location of the remaining defect, shape of the defect and the proximity to free margins a Z plasty was deemed most appropriate for complete closure of the defect.  Using a sterile surgical marker, an appropriate advancement flap was drawn incorporating the defect and placing the expected incisions within the relaxed skin tension lines where possible.    The area thus outlined was incised deep to adipose tissue with a #15 scalpel blade.  The skin margins were undermined to an appropriate distance in all directions utilizing iris scissors. Complex Repair And Dorsal Nasal Flap Text: The defect edges were debeveled with a #15 scalpel blade.  The primary defect was closed partially with a complex linear closure.  Given the location of the remaining defect, shape of the defect and the proximity to free margins a dorsal nasal flap was deemed most appropriate for complete closure of the defect.  Using a sterile surgical marker, an appropriate flap was drawn incorporating the defect and placing the expected incisions within the relaxed skin tension lines where possible.    The area thus outlined was incised deep to adipose tissue with a #15 scalpel blade.  The skin margins were undermined to an appropriate distance in all directions utilizing iris scissors. Complex Repair And Ftsg Text: The defect edges were debeveled with a #15 scalpel blade.  The primary defect was closed partially with a complex linear closure.  Given the location of the defect, shape of the defect and the proximity to free margins a full thickness skin graft was deemed most appropriate to repair the remaining defect.  The graft was trimmed to fit the size of the remaining defect.  The graft was then placed in the primary defect, oriented appropriately, and sutured into place. Complex Repair And Burow's Graft Text: The defect edges were debeveled with a #15 scalpel blade.  The primary defect was closed partially with a complex linear closure.  Given the location of the defect, shape of the defect, the proximity to free margins and the presence of a standing cone deformity a Burow's graft was deemed most appropriate to repair the remaining defect.  The graft was trimmed to fit the size of the remaining defect.  The graft was then placed in the primary defect, oriented appropriately, and sutured into place. Complex Repair And Split-Thickness Skin Graft Text: The defect edges were debeveled with a #15 scalpel blade.  The primary defect was closed partially with a complex linear closure.  Given the location of the defect, shape of the defect and the proximity to free margins a split thickness skin graft was deemed most appropriate to repair the remaining defect.  The graft was trimmed to fit the size of the remaining defect.  The graft was then placed in the primary defect, oriented appropriately, and sutured into place. Complex Repair And Epidermal Autograft Text: The defect edges were debeveled with a #15 scalpel blade.  The primary defect was closed partially with a complex linear closure.  Given the location of the defect, shape of the defect and the proximity to free margins an epidermal autograft was deemed most appropriate to repair the remaining defect.  The graft was trimmed to fit the size of the remaining defect.  The graft was then placed in the primary defect, oriented appropriately, and sutured into place. Complex Repair And Dermal Autograft Text: The defect edges were debeveled with a #15 scalpel blade.  The primary defect was closed partially with a complex linear closure.  Given the location of the defect, shape of the defect and the proximity to free margins an dermal autograft was deemed most appropriate to repair the remaining defect.  The graft was trimmed to fit the size of the remaining defect.  The graft was then placed in the primary defect, oriented appropriately, and sutured into place. Complex Repair And Tissue Cultured Epidermal Autograft Text: The defect edges were debeveled with a #15 scalpel blade.  The primary defect was closed partially with a complex linear closure.  Given the location of the defect, shape of the defect and the proximity to free margins an tissue cultured epidermal autograft was deemed most appropriate to repair the remaining defect.  The graft was trimmed to fit the size of the remaining defect.  The graft was then placed in the primary defect, oriented appropriately, and sutured into place. Complex Repair And Xenograft Text: The defect edges were debeveled with a #15 scalpel blade.  The primary defect was closed partially with a complex linear closure.  Given the location of the defect, shape of the defect and the proximity to free margins a xenograft was deemed most appropriate to repair the remaining defect.  The graft was trimmed to fit the size of the remaining defect.  The graft was then placed in the primary defect, oriented appropriately, and sutured into place. Complex Repair And Skin Substitute Graft Text: The defect edges were debeveled with a #15 scalpel blade.  The primary defect was closed partially with a complex linear closure.  Given the location of the remaining defect, shape of the defect and the proximity to free margins a skin substitute graft was deemed most appropriate to repair the remaining defect.  The graft was trimmed to fit the size of the remaining defect.  The graft was then placed in the primary defect, oriented appropriately, and sutured into place. Path Notes (To The Dermatopathologist): Please check margins. Specimen tagged at 12 o'clock position. Consent was obtained from the patient. The risks and benefits to therapy were discussed in detail. Specifically, the risks of infection, scarring, bleeding, prolonged wound healing, incomplete removal, allergy to anesthesia, nerve injury and recurrence were addressed. Prior to the procedure, the treatment site was clearly identified and confirmed by the patient. All components of Universal Protocol/PAUSE Rule completed. Post-Care Instructions: I reviewed with the patient in detail post-care instructions. Patient is not to engage in any heavy lifting, exercise, or swimming for the next 14 days. Should the patient develop any fevers, chills, bleeding, severe pain patient will contact the office immediately. Where Do You Want The Question To Include Opioid Counseling Located?: Case Summary Tab Information: Selecting Yes will display possible errors in your note based on the variables you have selected. This validation is only offered as a suggestion for you. PLEASE NOTE THAT THE VALIDATION TEXT WILL BE REMOVED WHEN YOU FINALIZE YOUR NOTE. IF YOU WANT TO FAX A PRELIMINARY NOTE YOU WILL NEED TO TOGGLE THIS TO 'NO' IF YOU DO NOT WANT IT IN YOUR FAXED NOTE.

## 2025-04-24 NOTE — PATIENT PROFILE ADULT. - VISION (WITH CORRECTIVE LENSES IF THE PATIENT USUALLY WEARS THEM):
Partially impaired: cannot see medication labels or newsprint, but can see obstacles in path, and the surrounding layout; can count fingers at arm's length
Walk in

## 2025-05-22 NOTE — PROGRESS NOTE ADULT - ASSESSMENT
chronically ill , in no acute distress, on stretcher with strap in place. Unable to walk so I did not take him off stretcher. Speech is slow, but I am able to undertand him. WIfe typically answers first. Spoke with daughter in detail. Patient feels fine and has no abdominal pain. Wishes to have EGD as out patient in [place] when goes with the daughter next week.